# Patient Record
Sex: MALE | Race: WHITE | Employment: OTHER | URBAN - METROPOLITAN AREA
[De-identification: names, ages, dates, MRNs, and addresses within clinical notes are randomized per-mention and may not be internally consistent; named-entity substitution may affect disease eponyms.]

---

## 2019-12-28 ENCOUNTER — APPOINTMENT (OUTPATIENT)
Dept: GENERAL RADIOLOGY | Facility: CLINIC | Age: 84
End: 2019-12-28
Attending: EMERGENCY MEDICINE
Payer: MEDICARE

## 2019-12-28 ENCOUNTER — APPOINTMENT (OUTPATIENT)
Dept: CT IMAGING | Facility: CLINIC | Age: 84
End: 2019-12-28
Attending: EMERGENCY MEDICINE
Payer: MEDICARE

## 2019-12-28 ENCOUNTER — HOSPITAL ENCOUNTER (EMERGENCY)
Facility: CLINIC | Age: 84
Discharge: HOME | End: 2019-12-28
Attending: EMERGENCY MEDICINE | Admitting: EMERGENCY MEDICINE
Payer: MEDICARE

## 2019-12-28 VITALS
SYSTOLIC BLOOD PRESSURE: 139 MMHG | RESPIRATION RATE: 16 BRPM | DIASTOLIC BLOOD PRESSURE: 87 MMHG | TEMPERATURE: 97.9 F | HEART RATE: 60 BPM | OXYGEN SATURATION: 94 %

## 2019-12-28 DIAGNOSIS — S52.531A CLOSED COLLES' FRACTURE OF RIGHT RADIUS, INITIAL ENCOUNTER: ICD-10-CM

## 2019-12-28 PROCEDURE — 73110 X-RAY EXAM OF WRIST: CPT | Mod: RT

## 2019-12-28 PROCEDURE — 40000986 XR WRIST RIGHT 2 VIEW: Mod: RT

## 2019-12-28 PROCEDURE — 99285 EMERGENCY DEPT VISIT HI MDM: CPT | Mod: 25

## 2019-12-28 PROCEDURE — 72220 X-RAY EXAM SACRUM TAILBONE: CPT

## 2019-12-28 PROCEDURE — 70450 CT HEAD/BRAIN W/O DYE: CPT

## 2019-12-28 PROCEDURE — 25605 CLTX DST RDL FX/EPHYS SEP W/: CPT | Mod: RT

## 2019-12-28 RX ORDER — TRAZODONE HYDROCHLORIDE 50 MG/1
50 TABLET, FILM COATED ORAL
COMMUNITY
Start: 2019-09-03 | End: 2023-01-01

## 2019-12-28 RX ORDER — GABAPENTIN 300 MG/1
300 CAPSULE ORAL 2 TIMES DAILY
COMMUNITY
Start: 2019-08-07 | End: 2022-02-28

## 2019-12-28 RX ORDER — SERTRALINE HYDROCHLORIDE 25 MG/1
25 TABLET, FILM COATED ORAL DAILY
COMMUNITY
Start: 2019-08-08 | End: 2022-02-28

## 2019-12-28 RX ORDER — SODIUM CHLORIDE 1 G/1
1 TABLET ORAL 2 TIMES DAILY
COMMUNITY
Start: 2019-09-03 | End: 2022-02-28

## 2019-12-28 RX ORDER — ZOLPIDEM TARTRATE 5 MG/1
5 TABLET ORAL
COMMUNITY
Start: 2019-08-07 | End: 2022-03-07

## 2019-12-28 RX ORDER — MIDODRINE HYDROCHLORIDE 5 MG/1
5 TABLET ORAL 2 TIMES DAILY
COMMUNITY
Start: 2019-09-03 | End: 2022-02-28

## 2019-12-28 RX ORDER — METOPROLOL SUCCINATE 25 MG/1
25 TABLET, EXTENDED RELEASE ORAL EVERY MORNING
COMMUNITY
Start: 2019-11-27 | End: 2022-02-28

## 2019-12-28 RX ORDER — PRIMIDONE 50 MG/1
TABLET ORAL
COMMUNITY
Start: 2019-06-30 | End: 2022-02-28

## 2019-12-28 ASSESSMENT — ENCOUNTER SYMPTOMS
WOUND: 0
JOINT SWELLING: 1

## 2019-12-28 NOTE — ED PROVIDER NOTES
History     Chief Complaint:  Fall    HPI   Jc Cloud is a right-handed 88 year old male currently on Eliquis who presents following a fall. The conditions in the area this morning are glare ice on all road and hard surfaces.  This is a result of freezing rain overnight. The patient was going out from his front door this morning and down the sidewalk to get the paper, when his feet flew out from under him and he fell backwards. During the fall, the patient hit his tailbone and head, breaking the fall with his right arm. He endorses some upper sacral pain and right wrist pain. He has no laceration to his scalp.    Allergies:  Atenolol  Lisinopril  Meperidine  Metoprolol  Quetiapine  Triamterene    Medications:    Eliquis  Neurontin  Toprol  Promatine  Mysoline  Zoloft  Desyrel  Ambien    Past Medical History:    Idiopathic peripheral neuropathy  Pulmonary nodule  Hyperlipidemia  Biventricular cardiac pacemaker in situ  Exertional dyspnea  CAD  Tremor  Depression  HTN  PVC  Anxiety  Panic attack  Insomnia  Orthostatic hypotension  Osteoarthritis  Diverticulosis of large intestine  Hypertrophy of nasal turbinates  Sleep apnea    Past Surgical History:    Inguinal hernia repair  Adenoidectomy  CABG  Cataract surgery    Family History:    Family history reviewed. No pertinent family history.     Social History:  Smoking Status: Former smoker  Alcohol Use: Positive  Drug use: Never    Review of Systems   Musculoskeletal: Positive for joint swelling.        Right wrist pain  Upper sacral pain   Skin: Negative for wound.   All other systems reviewed and are negative.        Physical Exam     Patient Vitals for the past 24 hrs:   BP Temp Temp src Pulse Heart Rate Resp SpO2   12/28/19 1200 139/87 -- -- 60 -- -- 94 %   12/28/19 1155 -- -- -- -- -- -- 95 %   12/28/19 1150 (!) 155/110 -- -- 104 -- -- --   12/28/19 1040 -- -- -- -- -- -- 98 %   12/28/19 1035 -- -- -- -- -- -- 98 %   12/28/19 1030 (!) 176/98 -- -- 66 -- --  97 %   12/28/19 0937 (!) 163/107 97.9  F (36.6  C) Oral 72 72 16 98 %     Physical Exam  General: Resting comfortably on the gurney    He is awake alert and appropriate.  Head:  The scalp, face, and head appear normal, no signs of significant trauma are appreciated  Eyes:  The pupils are equal, round, and reactive to light    There is no nystagmus    Extraocular muscles are intact    Conjunctivae and sclerae are normal  ENT:    The nose is normal    Pinnae are normal    The oropharynx is normal    Uvula is in the midline  Neck:  Normal range of motion    There is no rigidity noted    There is no midline cervical spine pain/tenderness    Trachea is in the midline    No mass is detected  CV:  Patient has atrial fibrillation with controlled ventricular response   Resp:  Lungs are clear    There is no tachypnea    Non-labored    No rales    No wheezing   GI:  Abdomen is soft, there is no rigidity    No distension    No tympani    No rebound tenderness     Non-surgical without peritoneal features  MS:  Right arm: There is dorsal deformity to the distal radius.  This clearly represents fracture.  This is a closed fracture.  Finger movement is normal.  Ulnar, median, radial nerve muscular and sensory function to the hand is normal.  Normal radial artery pulse.    Sacrum: There is mild tenderness to the superior sacrum.  The coccyx bone is nontender.  Skin:  No rash or acute skin lesions noted  Neuro: Speech is normal and fluent    GCS is 15.  Psych:  Awake. Alert.      Normal affect.  Appropriate interactions.  Lymph: No anterior cervical lymphadenopathy noted    Emergency Department Course     Imaging:  Radiology findings were communicated with the patient who voiced understanding of the findings.    XR Wrist Right 2 Views  Cast material obscures fine bone detail. Distal radial  fracture is noted with mild improvement in the dorsal tilting of  distal radial articular surface from the precast radiographs.  Reading per  radiology    XR Wrist Right G/E 3 Views  Distal radial fracture with transverse metaphyseal  fracture and longitudinal extension to the radiocarpal joint. Dorsal  impaction is noted with dorsal tilting of the distal radial articular  surface. Osteopenia.  HODA HOBSON MD  Reading per radiology    XR Sacrum and Coccyx 2 Views  No apparent fracture. Degenerative disc disease is noted  at the lumbosacral junction.  HODA HOBSON MD  Reading per radiology    Head CT w/o contrast  1. No evidence of acute intracranial hemorrhage, mass, or herniation.  2. There is generalized atrophy of the brain. White matter changes are  present in the cerebral hemispheres that are consistent with small  vessel ischemic disease in this age patient.   Reading per radiology    Procedures   PROCEDURE:  Hematoma Block, formed by Dr. Yuan    LOCATION:  Right wrist    ANESTHESIA: Regional block using 1% lidocaine with epi, total of 10 cc.     PROCEDURE NOTE: Betadiane was used to prep the skin. Using 25 gauge needle, skin was anesthestized and small amount of blood was withdrawn from the hematoma. The hematoma was anesthestized with lidocaine with 1% epi. The patient tolerated the procedure well with good relief of her right wrist discomfort.  There were no complications.       Fracture Reduction,      SITE: right wrist     CONSENT: Risks and benefits, along with alternative treatment modalities, were discussed with the patient.  The patient consented to the procedure verbally and signed the proper paperwork.    ANESTHESIA:  See hematoma block procedure note above    TECHNIQUE: Traction was applied and angulation was recreated and reduced.  Good alignment was confirmed by post reduction films were obtained.  The patient tolerated the procedure well and there were no complications.     OUTCOME:  Rechecked the patient after sedation was no longer present, findings and plan explained to the patient. Patients status improved.  Pain was  reduced and feeling more comfortably.       Volar Dorsal Splint in Fiberglass Placement, Dr. Yuan     Splint was applied by myself along with a technician support and after placement I checked and adjusted the fit to ensure proper positioning. Patient was more comfortable with splint in place. Sensation and circulation are intact after splint placement.  Patient was left in a slight degree of wrist flexion and ulnar deviation to attempt to hold the fracture fragments in place.    Emergency Department Course:    0950 Nursing notes and vitals reviewed.    0951 The patient was sent for a CT while in the emergency department, results above.      0959 The patient was sent for XRs while in the emergency department, results above.      1025 I performed an exam of the patient as documented above.     1045 I performed the hematoma block procedure as documented above.     1105 I performed the fracture reduction procedure as documented above. I performed the splint placement procedure as documented above.     1123 The patient was sent for post-reduction XRs while in the emergency department, results above.      1200 Patient rechecked and updated.      Impression & Plan      Medical Decision Making:  Jc Cloud is a 88 year old male who presents to the emergency department today for evaluation after a fall.  Patient suffered some minor scalp trauma, some discomfort after landing on his sacral region and most importantly the patient suffered significant pain and deformity of the right wrist.  Patient is noted to have a comminuted intra-articular distal radius fracture with dorsal angulation.  The patient underwent hematoma block without difficulty and the fracture was straightened and realigned for comfort.  This fracture pattern still has a high likelihood of instability and may require definitive surgical repair.  Consultation with hand surgery next week is indicated for either casting or conversations regarding  definitive surgical fixation.  Patient underwent CT scan of the brain given his use of novel anticoagulation for atrial fibrillation, and no acute bleed is seen.  Head injury sheet will be provided.    Diagnosis:    ICD-10-CM    1. Closed Colles' fracture of right radius, initial encounter S52.531A      Disposition:   The patient is discharged to home.       Scribe Disclosure:  Rishabh HOWELL, am serving as a scribe at 10:00 AM on 12/28/2019 to document services personally performed by Ricih Yuan MD based on my observations and the provider's statements to me.    EMERGENCY DEPARTMENT       Richi Yuan MD  12/28/19 4842

## 2019-12-28 NOTE — ED TRIAGE NOTES
Pt slipped and fell landed on bottom and right wrist. Pt has deformity to wrist. Pt ambulatory on scene

## 2019-12-28 NOTE — DISCHARGE INSTRUCTIONS
Keep your splint on until seen  Elevate is much as possible  You may take extra strength Tylenol as needed for pain  Plan to see the hand surgeon next week for consultation

## 2019-12-28 NOTE — ED NOTES
Bed: ED12  Expected date:   Expected time:   Means of arrival:   [place]  [facility] [place] [facility] [place] 88 [place] wrist pain eta 9245

## 2019-12-30 NOTE — PROGRESS NOTES
I rec'd a call from this patient's son, Kan who was looking for resources for his dad who was here over the weekend s/p fall and a broken wrist.  Kan is concerned with his dad being home alone once his brother who is currently with him has to leave to return home.  I offered for this patient to stay with Kan, for Kan to private pay to have an PCA to stay with his dad and I explained going to a TCU and the up front cost of around $5,000.  In discussion Kan would like to stop by the ER triage and  the list of options for private duty help in the home.  I answered all of Kan's questions and he was grateful for my call back to him.  I left the list of Private Duty care options with Kan's name on it in triage and asked the staff to give it to him when he swings by.

## 2020-01-03 ENCOUNTER — HOSPITAL ENCOUNTER (OUTPATIENT)
Facility: CLINIC | Age: 85
Setting detail: OBSERVATION
Discharge: HOME | End: 2020-01-04
Attending: ORTHOPAEDIC SURGERY | Admitting: ORTHOPAEDIC SURGERY
Payer: MEDICARE

## 2020-01-03 ENCOUNTER — ANESTHESIA EVENT (OUTPATIENT)
Dept: SURGERY | Facility: CLINIC | Age: 85
End: 2020-01-03
Payer: MEDICARE

## 2020-01-03 ENCOUNTER — ANESTHESIA (OUTPATIENT)
Dept: SURGERY | Facility: CLINIC | Age: 85
End: 2020-01-03
Payer: MEDICARE

## 2020-01-03 ENCOUNTER — APPOINTMENT (OUTPATIENT)
Dept: GENERAL RADIOLOGY | Facility: CLINIC | Age: 85
End: 2020-01-03
Attending: ORTHOPAEDIC SURGERY
Payer: MEDICARE

## 2020-01-03 DIAGNOSIS — G89.18 POST-OP PAIN: Primary | ICD-10-CM

## 2020-01-03 PROBLEM — R09.02 OXYGEN DESATURATION: Status: ACTIVE | Noted: 2020-01-03

## 2020-01-03 PROCEDURE — 37000008 ZZH ANESTHESIA TECHNICAL FEE, 1ST 30 MIN: Performed by: ORTHOPAEDIC SURGERY

## 2020-01-03 PROCEDURE — 25800030 ZZH RX IP 258 OP 636: Performed by: NURSE ANESTHETIST, CERTIFIED REGISTERED

## 2020-01-03 PROCEDURE — 36000058 ZZH SURGERY LEVEL 3 EA 15 ADDTL MIN: Performed by: ORTHOPAEDIC SURGERY

## 2020-01-03 PROCEDURE — 25000132 ZZH RX MED GY IP 250 OP 250 PS 637: Mod: GY | Performed by: PHYSICIAN ASSISTANT

## 2020-01-03 PROCEDURE — G0378 HOSPITAL OBSERVATION PER HR: HCPCS

## 2020-01-03 PROCEDURE — 25000128 H RX IP 250 OP 636: Performed by: NURSE ANESTHETIST, CERTIFIED REGISTERED

## 2020-01-03 PROCEDURE — 27210794 ZZH OR GENERAL SUPPLY STERILE: Performed by: ORTHOPAEDIC SURGERY

## 2020-01-03 PROCEDURE — 71000012 ZZH RECOVERY PHASE 1 LEVEL 1 FIRST HR: Performed by: ORTHOPAEDIC SURGERY

## 2020-01-03 PROCEDURE — 25000128 H RX IP 250 OP 636: Performed by: ANESTHESIOLOGY

## 2020-01-03 PROCEDURE — 36000060 ZZH SURGERY LEVEL 3 W FLUORO 1ST 30 MIN: Performed by: ORTHOPAEDIC SURGERY

## 2020-01-03 PROCEDURE — 40000170 ZZH STATISTIC PRE-PROCEDURE ASSESSMENT II: Performed by: ORTHOPAEDIC SURGERY

## 2020-01-03 PROCEDURE — 25000128 H RX IP 250 OP 636: Performed by: PHYSICIAN ASSISTANT

## 2020-01-03 PROCEDURE — 71000013 ZZH RECOVERY PHASE 1 LEVEL 1 EA ADDTL HR: Performed by: ORTHOPAEDIC SURGERY

## 2020-01-03 PROCEDURE — 37000009 ZZH ANESTHESIA TECHNICAL FEE, EACH ADDTL 15 MIN: Performed by: ORTHOPAEDIC SURGERY

## 2020-01-03 PROCEDURE — C1713 ANCHOR/SCREW BN/BN,TIS/BN: HCPCS | Performed by: ORTHOPAEDIC SURGERY

## 2020-01-03 PROCEDURE — 25000125 ZZHC RX 250: Performed by: NURSE ANESTHETIST, CERTIFIED REGISTERED

## 2020-01-03 PROCEDURE — 25000125 ZZHC RX 250: Performed by: ORTHOPAEDIC SURGERY

## 2020-01-03 PROCEDURE — 40000277 XR SURGERY CARM FLUORO LESS THAN 5 MIN W STILLS

## 2020-01-03 DEVICE — IMPLANTABLE DEVICE: Type: IMPLANTABLE DEVICE | Site: WRIST | Status: FUNCTIONAL

## 2020-01-03 RX ORDER — FENTANYL CITRATE 50 UG/ML
INJECTION, SOLUTION INTRAMUSCULAR; INTRAVENOUS PRN
Status: DISCONTINUED | OUTPATIENT
Start: 2020-01-03 | End: 2020-01-03

## 2020-01-03 RX ORDER — SODIUM CHLORIDE 1 G/1
1 TABLET ORAL 2 TIMES DAILY
Status: DISCONTINUED | OUTPATIENT
Start: 2020-01-03 | End: 2020-01-04 | Stop reason: HOSPADM

## 2020-01-03 RX ORDER — SERTRALINE HYDROCHLORIDE 25 MG/1
25 TABLET, FILM COATED ORAL DAILY
Status: DISCONTINUED | OUTPATIENT
Start: 2020-01-04 | End: 2020-01-04 | Stop reason: HOSPADM

## 2020-01-03 RX ORDER — DEXAMETHASONE SODIUM PHOSPHATE 4 MG/ML
INJECTION, SOLUTION INTRA-ARTICULAR; INTRALESIONAL; INTRAMUSCULAR; INTRAVENOUS; SOFT TISSUE PRN
Status: DISCONTINUED | OUTPATIENT
Start: 2020-01-03 | End: 2020-01-03

## 2020-01-03 RX ORDER — MEPERIDINE HYDROCHLORIDE 25 MG/ML
12.5 INJECTION INTRAMUSCULAR; INTRAVENOUS; SUBCUTANEOUS
Status: DISCONTINUED | OUTPATIENT
Start: 2020-01-03 | End: 2020-01-03 | Stop reason: HOSPADM

## 2020-01-03 RX ORDER — ONDANSETRON 2 MG/ML
4 INJECTION INTRAMUSCULAR; INTRAVENOUS EVERY 30 MIN PRN
Status: DISCONTINUED | OUTPATIENT
Start: 2020-01-03 | End: 2020-01-03 | Stop reason: HOSPADM

## 2020-01-03 RX ORDER — NALOXONE HYDROCHLORIDE 0.4 MG/ML
.1-.4 INJECTION, SOLUTION INTRAMUSCULAR; INTRAVENOUS; SUBCUTANEOUS
Status: DISCONTINUED | OUTPATIENT
Start: 2020-01-03 | End: 2020-01-03 | Stop reason: HOSPADM

## 2020-01-03 RX ORDER — ONDANSETRON 2 MG/ML
INJECTION INTRAMUSCULAR; INTRAVENOUS PRN
Status: DISCONTINUED | OUTPATIENT
Start: 2020-01-03 | End: 2020-01-03

## 2020-01-03 RX ORDER — PROPOFOL 10 MG/ML
INJECTION, EMULSION INTRAVENOUS CONTINUOUS PRN
Status: DISCONTINUED | OUTPATIENT
Start: 2020-01-03 | End: 2020-01-03

## 2020-01-03 RX ORDER — CEFAZOLIN SODIUM 1 G/3ML
1 INJECTION, POWDER, FOR SOLUTION INTRAMUSCULAR; INTRAVENOUS SEE ADMIN INSTRUCTIONS
Status: DISCONTINUED | OUTPATIENT
Start: 2020-01-03 | End: 2020-01-03 | Stop reason: HOSPADM

## 2020-01-03 RX ORDER — BUPIVACAINE HYDROCHLORIDE AND EPINEPHRINE 5; 5 MG/ML; UG/ML
INJECTION, SOLUTION EPIDURAL; INTRACAUDAL; PERINEURAL
Status: DISCONTINUED
Start: 2020-01-03 | End: 2020-01-03 | Stop reason: HOSPADM

## 2020-01-03 RX ORDER — BUPIVACAINE HYDROCHLORIDE AND EPINEPHRINE 5; 5 MG/ML; UG/ML
INJECTION, SOLUTION PERINEURAL PRN
Status: DISCONTINUED | OUTPATIENT
Start: 2020-01-03 | End: 2020-01-03 | Stop reason: HOSPADM

## 2020-01-03 RX ORDER — HYDROCODONE BITARTRATE AND ACETAMINOPHEN 5; 325 MG/1; MG/1
1-2 TABLET ORAL EVERY 4 HOURS PRN
Status: DISCONTINUED | OUTPATIENT
Start: 2020-01-03 | End: 2020-01-04 | Stop reason: HOSPADM

## 2020-01-03 RX ORDER — MIDODRINE HYDROCHLORIDE 5 MG/1
5 TABLET ORAL 3 TIMES DAILY
Status: DISCONTINUED | OUTPATIENT
Start: 2020-01-03 | End: 2020-01-04 | Stop reason: HOSPADM

## 2020-01-03 RX ORDER — ONDANSETRON 4 MG/1
4 TABLET, ORALLY DISINTEGRATING ORAL EVERY 30 MIN PRN
Status: DISCONTINUED | OUTPATIENT
Start: 2020-01-03 | End: 2020-01-03 | Stop reason: HOSPADM

## 2020-01-03 RX ORDER — PRIMIDONE 50 MG/1
25 TABLET ORAL 2 TIMES DAILY
Status: DISCONTINUED | OUTPATIENT
Start: 2020-01-03 | End: 2020-01-04 | Stop reason: HOSPADM

## 2020-01-03 RX ORDER — METOCLOPRAMIDE HYDROCHLORIDE 5 MG/ML
5 INJECTION INTRAMUSCULAR; INTRAVENOUS EVERY 6 HOURS PRN
Status: DISCONTINUED | OUTPATIENT
Start: 2020-01-03 | End: 2020-01-04 | Stop reason: HOSPADM

## 2020-01-03 RX ORDER — CEFAZOLIN SODIUM 2 G/100ML
2 INJECTION, SOLUTION INTRAVENOUS
Status: COMPLETED | OUTPATIENT
Start: 2020-01-03 | End: 2020-01-03

## 2020-01-03 RX ORDER — ZOLPIDEM TARTRATE 5 MG/1
5 TABLET ORAL
Status: DISCONTINUED | OUTPATIENT
Start: 2020-01-03 | End: 2020-01-04 | Stop reason: DRUGHIGH

## 2020-01-03 RX ORDER — METOPROLOL SUCCINATE 25 MG/1
25 TABLET, EXTENDED RELEASE ORAL 2 TIMES DAILY
Status: DISCONTINUED | OUTPATIENT
Start: 2020-01-03 | End: 2020-01-04 | Stop reason: HOSPADM

## 2020-01-03 RX ORDER — SODIUM CHLORIDE, SODIUM LACTATE, POTASSIUM CHLORIDE, CALCIUM CHLORIDE 600; 310; 30; 20 MG/100ML; MG/100ML; MG/100ML; MG/100ML
INJECTION, SOLUTION INTRAVENOUS CONTINUOUS PRN
Status: DISCONTINUED | OUTPATIENT
Start: 2020-01-03 | End: 2020-01-03

## 2020-01-03 RX ORDER — MAGNESIUM HYDROXIDE 1200 MG/15ML
LIQUID ORAL PRN
Status: DISCONTINUED | OUTPATIENT
Start: 2020-01-03 | End: 2020-01-03 | Stop reason: HOSPADM

## 2020-01-03 RX ORDER — FENTANYL CITRATE 0.05 MG/ML
25-50 INJECTION, SOLUTION INTRAMUSCULAR; INTRAVENOUS
Status: COMPLETED | OUTPATIENT
Start: 2020-01-03 | End: 2020-01-03

## 2020-01-03 RX ORDER — NALOXONE HYDROCHLORIDE 0.4 MG/ML
.1-.4 INJECTION, SOLUTION INTRAMUSCULAR; INTRAVENOUS; SUBCUTANEOUS
Status: DISCONTINUED | OUTPATIENT
Start: 2020-01-03 | End: 2020-01-04 | Stop reason: HOSPADM

## 2020-01-03 RX ORDER — PROCHLORPERAZINE MALEATE 5 MG
5 TABLET ORAL EVERY 6 HOURS PRN
Status: DISCONTINUED | OUTPATIENT
Start: 2020-01-03 | End: 2020-01-04 | Stop reason: HOSPADM

## 2020-01-03 RX ORDER — HYDRALAZINE HYDROCHLORIDE 20 MG/ML
2.5-5 INJECTION INTRAMUSCULAR; INTRAVENOUS EVERY 10 MIN PRN
Status: DISCONTINUED | OUTPATIENT
Start: 2020-01-03 | End: 2020-01-03 | Stop reason: HOSPADM

## 2020-01-03 RX ORDER — METOCLOPRAMIDE 5 MG/1
5 TABLET ORAL EVERY 6 HOURS PRN
Status: DISCONTINUED | OUTPATIENT
Start: 2020-01-03 | End: 2020-01-04 | Stop reason: HOSPADM

## 2020-01-03 RX ORDER — ONDANSETRON 2 MG/ML
4 INJECTION INTRAMUSCULAR; INTRAVENOUS EVERY 6 HOURS PRN
Status: DISCONTINUED | OUTPATIENT
Start: 2020-01-03 | End: 2020-01-04 | Stop reason: HOSPADM

## 2020-01-03 RX ORDER — HYDROCODONE BITARTRATE AND ACETAMINOPHEN 5; 325 MG/1; MG/1
TABLET ORAL
Qty: 24 TABLET | Refills: 0 | Status: SHIPPED | OUTPATIENT
Start: 2020-01-03 | End: 2022-03-07

## 2020-01-03 RX ORDER — ALBUTEROL SULFATE 0.83 MG/ML
2.5 SOLUTION RESPIRATORY (INHALATION) EVERY 4 HOURS PRN
Status: DISCONTINUED | OUTPATIENT
Start: 2020-01-03 | End: 2020-01-03 | Stop reason: HOSPADM

## 2020-01-03 RX ORDER — TRAZODONE HYDROCHLORIDE 50 MG/1
50-100 TABLET, FILM COATED ORAL AT BEDTIME
Status: DISCONTINUED | OUTPATIENT
Start: 2020-01-03 | End: 2020-01-04 | Stop reason: HOSPADM

## 2020-01-03 RX ORDER — PROPOFOL 10 MG/ML
INJECTION, EMULSION INTRAVENOUS PRN
Status: DISCONTINUED | OUTPATIENT
Start: 2020-01-03 | End: 2020-01-03

## 2020-01-03 RX ORDER — SODIUM CHLORIDE, SODIUM LACTATE, POTASSIUM CHLORIDE, CALCIUM CHLORIDE 600; 310; 30; 20 MG/100ML; MG/100ML; MG/100ML; MG/100ML
INJECTION, SOLUTION INTRAVENOUS CONTINUOUS
Status: DISCONTINUED | OUTPATIENT
Start: 2020-01-03 | End: 2020-01-03 | Stop reason: HOSPADM

## 2020-01-03 RX ORDER — LIDOCAINE 40 MG/G
CREAM TOPICAL
Status: DISCONTINUED | OUTPATIENT
Start: 2020-01-03 | End: 2020-01-04 | Stop reason: HOSPADM

## 2020-01-03 RX ORDER — ONDANSETRON 4 MG/1
4 TABLET, ORALLY DISINTEGRATING ORAL EVERY 6 HOURS PRN
Status: DISCONTINUED | OUTPATIENT
Start: 2020-01-03 | End: 2020-01-04 | Stop reason: HOSPADM

## 2020-01-03 RX ADMIN — PHENYLEPHRINE HYDROCHLORIDE 100 MCG: 10 INJECTION INTRAVENOUS at 17:20

## 2020-01-03 RX ADMIN — PROPOFOL 20 MG: 10 INJECTION, EMULSION INTRAVENOUS at 17:26

## 2020-01-03 RX ADMIN — PHENYLEPHRINE HYDROCHLORIDE 100 MCG: 10 INJECTION INTRAVENOUS at 17:35

## 2020-01-03 RX ADMIN — TRAZODONE HYDROCHLORIDE 50 MG: 50 TABLET ORAL at 23:21

## 2020-01-03 RX ADMIN — PROPOFOL 40 MG: 10 INJECTION, EMULSION INTRAVENOUS at 16:59

## 2020-01-03 RX ADMIN — PROPOFOL 100 MCG/KG/MIN: 10 INJECTION, EMULSION INTRAVENOUS at 16:57

## 2020-01-03 RX ADMIN — FENTANYL CITRATE 50 MCG: 50 INJECTION, SOLUTION INTRAMUSCULAR; INTRAVENOUS at 16:53

## 2020-01-03 RX ADMIN — PHENYLEPHRINE HYDROCHLORIDE 200 MCG: 10 INJECTION INTRAVENOUS at 17:53

## 2020-01-03 RX ADMIN — SODIUM CHLORIDE, POTASSIUM CHLORIDE, SODIUM LACTATE AND CALCIUM CHLORIDE: 600; 310; 30; 20 INJECTION, SOLUTION INTRAVENOUS at 16:53

## 2020-01-03 RX ADMIN — CEFAZOLIN SODIUM 2 G: 2 INJECTION, SOLUTION INTRAVENOUS at 16:57

## 2020-01-03 RX ADMIN — METOPROLOL SUCCINATE 25 MG: 25 TABLET, EXTENDED RELEASE ORAL at 23:21

## 2020-01-03 RX ADMIN — BUPIVACAINE HYDROCHLORIDE 30 ML GIVEN: 5 INJECTION, SOLUTION EPIDURAL; INTRACAUDAL; PERINEURAL at 14:51

## 2020-01-03 RX ADMIN — PHENYLEPHRINE HYDROCHLORIDE 100 MCG: 10 INJECTION INTRAVENOUS at 17:34

## 2020-01-03 RX ADMIN — FENTANYL CITRATE 50 MCG: 0.05 INJECTION, SOLUTION INTRAMUSCULAR; INTRAVENOUS at 14:37

## 2020-01-03 RX ADMIN — ONDANSETRON 4 MG: 2 INJECTION INTRAMUSCULAR; INTRAVENOUS at 17:01

## 2020-01-03 RX ADMIN — FENTANYL CITRATE 50 MCG: 50 INJECTION, SOLUTION INTRAMUSCULAR; INTRAVENOUS at 17:27

## 2020-01-03 RX ADMIN — DEXAMETHASONE SODIUM PHOSPHATE 4 MG: 4 INJECTION, SOLUTION INTRA-ARTICULAR; INTRALESIONAL; INTRAMUSCULAR; INTRAVENOUS; SOFT TISSUE at 17:01

## 2020-01-03 RX ADMIN — PROPOFOL 30 MG: 10 INJECTION, EMULSION INTRAVENOUS at 17:28

## 2020-01-03 RX ADMIN — PHENYLEPHRINE HYDROCHLORIDE 200 MCG: 10 INJECTION INTRAVENOUS at 17:47

## 2020-01-03 RX ADMIN — PHENYLEPHRINE HYDROCHLORIDE 100 MCG: 10 INJECTION INTRAVENOUS at 17:06

## 2020-01-03 RX ADMIN — HYDROCODONE BITARTRATE AND ACETAMINOPHEN 1 TABLET: 5; 325 TABLET ORAL at 23:21

## 2020-01-03 ASSESSMENT — ENCOUNTER SYMPTOMS
DYSRHYTHMIAS: 1
SEIZURES: 0

## 2020-01-03 ASSESSMENT — COPD QUESTIONNAIRES: COPD: 0

## 2020-01-03 ASSESSMENT — MIFFLIN-ST. JEOR: SCORE: 1392.03

## 2020-01-03 ASSESSMENT — LIFESTYLE VARIABLES: TOBACCO_USE: 0

## 2020-01-03 NOTE — ANESTHESIA PREPROCEDURE EVALUATION
Anesthesia Pre-Procedure Evaluation    Patient: Jc Cloud   MRN: [id number] : 3/25/1931          Preoperative Diagnosis: Radius fracture [S52.90XA]    Procedure(s):  OPEN REDUCTION INTERNAL FIXATION RIGHT RADIUS FRACTURE ( JUANA PLATE ; SHERRY )    Past Medical History:   Diagnosis Date     Atrial fibrillation (H)      Coronary artery disease      Hyperlipidemia      Neuropathy      Pacemaker      Sleep apnea      Past Surgical History:   Procedure Laterality Date     ADENOIDECTOMY       BYPASS GRAFT ARTERY CORONARY       CATARACT IOL, RT/LT       HERNIA REPAIR       Allergies no known allergies  Prior to Admission medications    Medication Sig Start Date End Date Taking? Authorizing Provider   apixaban ANTICOAGULANT (ELIQUIS ANTICOAGULANT) 2.5 MG tablet TAKE 1 TABLET BY MOUTH TWICE A DAY 19   Reported, Patient   gabapentin (NEURONTIN) 300 MG capsule Take 300 mg by mouth 19   Reported, Patient   metoprolol succinate ER (TOPROL-XL) 25 MG 24 hr tablet TAKE 1 TABLET BY MOUTH TWICE A DAY 19   Reported, Patient   midodrine (PROAMATINE) 5 MG tablet Take 5 mg by mouth 9/3/19   Reported, Patient   primidone (MYSOLINE) 50 MG tablet TAKE 1/2 TABLET BY MOUTH TWICE DAILY 19   Reported, Patient   sertraline (ZOLOFT) 25 MG tablet Take 25 mg by mouth 19   Reported, Patient   sodium chloride 1 GM tablet Take 1 g by mouth 9/3/19   Reported, Patient   traZODone (DESYREL) 50 MG tablet Take  mg by mouth 9/3/19   Reported, Patient   zolpidem (AMBIEN) 5 MG tablet Take 5 mg by mouth 19   Reported, Patient     ECG: AV dual-paced rhythm with occasional ventricular-paced complexes and with frequent , and consecutive Premature ventricular complexes  Abnormal ECG  When compared with ECG of 2018 12:07,  Premature ventricular complexes are now Present      Anesthesia Evaluation     .             ROS/MED HX    ENT/Pulmonary:     (+)sleep apnea, , . .   (-) tobacco use, asthma and COPD    Neurologic:     (+)neuropathy    (-) seizures, CVA and TIA   Cardiovascular:     (+) Dyslipidemia, --CAD, -past MI,CABG-. : . . . pacemaker :. dysrhythmias a-fib, .      (-) valvular problems/murmurs   METS/Exercise Tolerance:  3 - Able to walk 1-2 blocks without stopping   Hematologic:        (-) history of blood clots, anemia and other hematologic disorder   Musculoskeletal:   (+) arthritis,  -       GI/Hepatic:        (-) GERD and liver disease   Renal/Genitourinary:      (-) nephrolithiasis   Endo:      (-) Type I DM, Type II DM, thyroid disease and obesity   Psychiatric:     (+) psychiatric history anxiety and depression      Infectious Disease:        (-) Recent Fever   Malignancy:         Other:                          Physical Exam  Normal systems: cardiovascular, pulmonary and dental    Airway   Mallampati: II  TM distance: >3 FB  Neck ROM: full    Dental     Cardiovascular   Rhythm and rate: regular and normal      Pulmonary    breath sounds clear to auscultation            Preop Vitals  BP Readings from Last 3 Encounters:   12/28/19 139/87    Pulse Readings from Last 3 Encounters:   12/28/19 60      Resp Readings from Last 3 Encounters:   12/28/19 16    SpO2 Readings from Last 3 Encounters:   12/28/19 94%      Temp Readings from Last 1 Encounters:   12/28/19 36.6  C (97.9  F) (Oral)    Ht Readings from Last 1 Encounters:   No data found for Ht      Wt Readings from Last 1 Encounters:   No data found for Wt    There is no height or weight on file to calculate BMI.       Anesthesia Plan      History & Physical Review      ASA Status:  3 .    NPO Status:  > 8 hours    Plan for Peripheral Nerve Block   PONV prophylaxis:  Ondansetron (or other 5HT-3) and Dexamethasone or Solumedrol  Propofol infusion      Postoperative Care  Postoperative pain management:  Multi-modal analgesia.      Consents  Anesthetic plan, risks, benefits and alternatives discussed with:  Patient..                 Renee Powers MD

## 2020-01-03 NOTE — ANESTHESIA PROCEDURE NOTES
Peripheral nerve/Neuraxial procedure note : Other and Brachial plexus (axillary nerve block)  Pre-Procedure  Performed by Renee Powers MD  Location: pre-op    Procedure Times:1/3/2020 2:41 PM and 1/3/2020 2:51 PM  Pre-Anesthestic Checklist: patient identified, IV checked, site marked, risks and benefits discussed, informed consent, monitors and equipment checked, pre-op evaluation, at physician/surgeon's request and post-op pain management    Timeout  Correct Patient: Yes   Correct Procedure: Yes   Correct Site: Yes   Correct Laterality: Yes   Correct Position: Yes   Site Marked: Yes   .   Procedure Documentation    .    Procedure: Other and Brachial plexus (axillary nerve block), right.   Patient Position:sitting Local skin infiltrated with 1 mL of 1% lidocaine.    Ultrasound used to identify targeted nerve, plexus, or vascular marker and placed a needle adjacent to it., Ultrasound was used to visualize the spread of the anesthetic in close proximity to the above stated nerve. A permanent image is entered into the patient's record.  Patient Prep/Sterile Barriers; povidone-iodine 7.5% surgical scrub.  .  Needle: insulated   Needle Gauge: 21.    Needle Length (Inches) 3.13   Insertion Method: Single Shot.        Assessment/Narrative  Paresthesias: No.  .  The placement was negative for: blood aspirated, painful injection and site bleeding.  Bolus given via needle. No blood aspirated via catheter.   Secured via.   Complications:. Comments:  Bolus via needle, 30 ml of 0.5% Bupivacaine with 1:400,000 epinephrine.  Patient tolerated well, was mildly sedated but communicative throughout the procedure.   The surgeon has given a verbal order transferring care of this patient to me for the performance of regional analgesia block for post op pain control. It is requested of me because I am uniquely trained and qualified to perform this block and the surgeon is neither trained nor qualified to perform this procedure.

## 2020-01-04 VITALS
SYSTOLIC BLOOD PRESSURE: 132 MMHG | DIASTOLIC BLOOD PRESSURE: 74 MMHG | HEIGHT: 71 IN | OXYGEN SATURATION: 98 % | BODY MASS INDEX: 21.6 KG/M2 | HEART RATE: 65 BPM | WEIGHT: 154.3 LBS | TEMPERATURE: 98.2 F | RESPIRATION RATE: 16 BRPM

## 2020-01-04 LAB
CREAT SERPL-MCNC: 1.26 MG/DL (ref 0.66–1.25)
GFR SERPL CREATININE-BSD FRML MDRD: 50 ML/MIN/{1.73_M2}
GLUCOSE SERPL-MCNC: 88 MG/DL (ref 70–99)

## 2020-01-04 PROCEDURE — 36415 COLL VENOUS BLD VENIPUNCTURE: CPT | Performed by: ORTHOPAEDIC SURGERY

## 2020-01-04 PROCEDURE — 82565 ASSAY OF CREATININE: CPT | Performed by: ORTHOPAEDIC SURGERY

## 2020-01-04 PROCEDURE — 25000132 ZZH RX MED GY IP 250 OP 250 PS 637: Mod: GY | Performed by: PHYSICIAN ASSISTANT

## 2020-01-04 PROCEDURE — 82947 ASSAY GLUCOSE BLOOD QUANT: CPT | Performed by: ORTHOPAEDIC SURGERY

## 2020-01-04 PROCEDURE — G0378 HOSPITAL OBSERVATION PER HR: HCPCS

## 2020-01-04 RX ADMIN — APIXABAN 2.5 MG: 2.5 TABLET, FILM COATED ORAL at 08:38

## 2020-01-04 RX ADMIN — MIDODRINE HYDROCHLORIDE 5 MG: 5 TABLET ORAL at 08:38

## 2020-01-04 RX ADMIN — SODIUM CHLORIDE TAB 1 GM 1 G: 1 TAB at 00:07

## 2020-01-04 RX ADMIN — HYDROCODONE BITARTRATE AND ACETAMINOPHEN 1 TABLET: 5; 325 TABLET ORAL at 08:38

## 2020-01-04 RX ADMIN — SODIUM CHLORIDE TAB 1 GM 1 G: 1 TAB at 08:37

## 2020-01-04 RX ADMIN — MIDODRINE HYDROCHLORIDE 5 MG: 5 TABLET ORAL at 00:07

## 2020-01-04 RX ADMIN — PRIMIDONE 25 MG: 50 TABLET ORAL at 00:07

## 2020-01-04 RX ADMIN — PRIMIDONE 25 MG: 50 TABLET ORAL at 08:38

## 2020-01-04 RX ADMIN — SERTRALINE HYDROCHLORIDE 25 MG: 25 TABLET ORAL at 08:38

## 2020-01-04 RX ADMIN — METOPROLOL SUCCINATE 25 MG: 25 TABLET, EXTENDED RELEASE ORAL at 08:38

## 2020-01-04 NOTE — BRIEF OP NOTE
[facility]    Brief Operative Note    Pre-operative diagnosis: Radius fracture [S52.90XA]  Post-operative diagnosis;       Comminuted Intraarticular radius fracture    Procedure: Procedure(s):  OPEN REDUCTION INTERNAL FIXATION RIGHT DISTAL RADIUS FRACTURE  Surgeon: Surgeon(s) and Role:     * Susan Mcdonald MD - Primary     * Leena Naidu PA-C - Assisting  Anesthesia: None   Estimated blood loss: Less than 10 ml  Drains: None  Specimens: * No specimens in log *  Findings:   None.  Complications: None.  Implants:   Implant Name Type Inv. Item Serial No.  Lot No. LRB No. Used   little Variax 2 wrist; 76mm intermediate volar distal radius plate     LITTLE 41 07 [id number] Right 1   little Variax 2 wrist; 2.4MM LOCKING SCREW (22MM)    LITTLE 41 07 [id number] Right 1   little Variax 2 wrist; 2.4MM LOCKING SCREW (24MM)    LITTLE 41 07 [id number] Right 2   little Variax 2 wrist; 2.4MM LOCKING SCREW (18MM)    LITTLE 41 07 [id number] Right 2   little Variax 2 wrist; 2.4MM NON LOCKING SCREWS (22MM)    LITTLE 41 07 31 DEC 2019 Right 1   little Variax 2 wrist; 2.7MM NON LOCKING SCREWS (16MM)    LITTLE 41 07 [id number] Right 3   little Variax 2 wrist; 2.7MM NON LOCKING SCREWS (18MM)     41 07 [id number] Right 1   little Variax 2 wrist; 2.7MM NON LOCKING SCREWS (18MM)    LITTLE 41 07 [id number] Right 1   little Variax 2 wrist; 2.7MM NON LOCKING SCREWS (26MM)    LITTLE 41 07 [id number] Right 2   little Variax 2 wrist; 2.7MM NON LOCKING SCREWS (28MM)    LITTLE 41 07 [id number] Right 1   little Variax 2 wrist; DRILL BIT  2.0     LITTLE 41 07 [id number] Right 1

## 2020-01-04 NOTE — ANESTHESIA POSTPROCEDURE EVALUATION
Patient: Jc Cloud    Procedure(s):  OPEN REDUCTION INTERNAL FIXATION RIGHT DISTAL RADIUS FRACTURE    Diagnosis:Radius fracture [S52.90XA]  Diagnosis Additional Information: No value filed.    Anesthesia Type:  Peripheral Nerve Block    Note:  Anesthesia Post Evaluation    Patient location during evaluation: PACU  Patient participation: Able to fully participate in evaluation  Level of consciousness: awake and alert  Pain management: adequate  Airway patency: patent  Cardiovascular status: acceptable  Respiratory status: requiring 1L NC, will stay in observation tonight.  Hydration status: acceptable  PONV: none     Anesthetic complications: None          Last vitals:  Vitals:    01/03/20 2050 01/03/20 2100 01/03/20 2130   BP: (!) 135/94 (!) 138/92 106/64   Pulse: 68 71 67   Resp: 22 16    Temp:   37  C (98.6  F)   SpO2: 94% 93% 95%         Electronically Signed By: Juan José Patten MD  January 3, 2020  9:57 PM

## 2020-01-04 NOTE — PROGRESS NOTES
-Return to baseline mental status - Yes  -Hypercapnia, hypoventilation or hypoxia resolved for at least 2 hours without supplemental oxygen - Yes but patient declined continuous monitoring   -Deficits in sensation, mobility or coordination have resolved if spinal or regional anesthesia was used - Absent/numbness due to nerve block

## 2020-01-04 NOTE — PROGRESS NOTES
"Called Pt's Son \"Polly\" and notified him of pt's readiness to discharge.  Pt's son is now at the bedside.  Will reiterate pt's discharge instructions.  Last night when getting report from the PACU RN she stated that the pt's son already had the scripts for the pt.  Went over discharge instructions with pt and pt's son Polly.  Pt understands that his block may last a bit and he feels well enough to go home.  Pt's O2 sats have maintained in the 92's on RA.  Pt has met his goals and his son Polly and Kan are taking him home.    "

## 2020-01-04 NOTE — OR NURSING
1925  Pt given incentive spirometer to use frequently, encourage coughing. Sitting upright in bed, eating crackers and drinking juice. Sons at bedside. Still needing 1 liter of oxygen  2030 PT ON ROOM AIR BUT NEEDING INCENTIVE SPIROMETER AND CDB FREQUENTLY TO KEEPS SATS UP.  WILL DROP TO 85% AT TIMES BUT WILL COME BACK UP WITH IS.  Dr Shankar notified of sats, orders placed for pt to spend the night  2100 pt stood to void with 2 person assist

## 2020-01-04 NOTE — DISCHARGE INSTRUCTIONS
Same Day Surgery Discharge Instructions for  Sedation and General Anesthesia       It's not unusual to feel dizzy, light-headed or faint for up to 24 hours after surgery or while taking pain medication.  If you have these symptoms: sit for a few minutes before standing and have someone assist you when you get up to walk or use the bathroom.      You should rest and relax for the next 24 hours. We recommend you make arrangements to have an adult stay with you for at least 24 hours after your discharge.  Avoid hazardous and strenuous activity.      DO NOT DRIVE any vehicle or operate mechanical equipment for 24 hours following the end of your surgery.  Even though you may feel normal, your reactions may be affected by the medication you have received.      Do not drink alcoholic beverages for 24 hours following surgery.       Slowly progress to your regular diet as you feel able. It's not unusual to feel nauseated and/or vomit after receiving anesthesia.  If you develop these symptoms, drink clear liquids (apple juice, ginger ale, broth, 7-up, etc. ) until you feel better.  If your nausea and vomiting persists for 24 hours, please notify your surgeon.        All narcotic pain medications, along with inactivity and anesthesia, can cause constipation. Drinking plenty of liquids and increasing fiber intake will help.      For any questions of a medical nature, call your surgeon.      Do not make important decisions for 24 hours.      If you had general anesthesia, you may have a sore throat for a couple of days related to the breathing tube used during surgery.  You may use Cepacol lozenges to help with this discomfort.  If it worsens or if you develop a fever, contact your surgeon.       If you feel your pain is not well managed with the pain medications prescribed by your surgeon, please contact your surgeon's office to let them know so they can address your concerns.     **If you have questions or concerns about your  procedure,  call Dr. Mcdonald at [phone]**

## 2020-01-04 NOTE — OP NOTE
SURGEON: CLAIRE JIMENEZ M.D.  ASSISTANT: LUX AREVALO PA-C.    NEED FOR ASSISTANT  A skilled first assistant was necessary through all portions of the case in order to assist with patient positioning, careful retracting to avoid nerve damage, wound closure, and dressing and splint application.    PREOPERATIVE DIAGNOSIS: Comminuted Intraarticular right distal radius fracture.    POSTOPERATIVE DIAGNOSIS:Comminuted  Intraarticular right distal radius fracture.    PROCEDURE: Open reduction and internal fixation of the radius with an [employer] plate ([id number]).    ANESTHESIA  Regional/general.    TOURNIQUET TIME  50 minutes    INFORMED CONSENT  Obtained and signed prior to entrance to the operating room.    SURGICAL SITE SIGNED  The surgical site was signed by me prior to entering the OR.    TIME OUT  Performed prior to incision in the operating room.    PROPHYLACTIC ANTIBIOTICS  Ancef 2 gms given before the tourniquet was inflated.    DVT INDICATIONS  SCD's were not applied.    PROCEDURE:  The arm was prepped and draped in a normal standard manner.  A time-out was performed confirming the surgical site and that antibiotics had been given prior to the tourniquet inflation.  The arm was exsanguinated and the tourniquet was inflated 150 mm above the systolic pressure.    An incision was made directly over the FCR sheath and was carried down through the subcutaneous tissue.  The FCR sheath was then divided dorsally and volarly taking care not to cut the palmar cutaneous branch of the median nerve.  The FCR was retracted ulnarly along with the contents of the carpal canal.  The pronator quadratus was cut on the radial side and elevated ulnarly with the underlying fascia.  Weitlaner was used to retract the soft tissues.  The fracture was reduced and held with two K-wires placed through the radial styloid into the proximal radius to hold the fracture reduced.  Once this was done, the plate was chosen for the distal radius  and another 0.045 K-wire was used to hold the plate in place.  I drilled for the sliding screw hole first and a 3.5 screw was placed there.  I then drilled the distal articular screws under mini C-arm fluoroscopy.  All of the distal screws were just shy of the dorsal cortex.  The remaining 3.5 bi-cortical screws were drilled, measured, and placed.  Final x-rays were taken confirming the screw placement and reduction of the fracture.  The area was then irrigated out.  The pronator quadratus was repaired radially with 3-0 undyed Vicryl.  The FCR sheath was repaired volarly with 3-0 undyed Vicryl.  The tourniquet was then let down and all bleeders were cauterized.  The subcutaneous skin was closed with 3-0 undyed Vicryl and skin was closed with 4-0 Monocryl.  Marcaine with epinephrine was injected.  Splint was applied and the patient was transferred to the recovery room in stable condition.    Cc: my office

## 2020-01-04 NOTE — PROGRESS NOTES
"Jc Cloud  2020  POD # 1 right distal radius fracture  Admit Date:  1/3/2020      Clean wound without signs of infection.  Objective: no chest pain noted. Patient states he cannot move his fingers and cannot feel anything in his right hand.   Blood pressure (P) 114/74, pulse 65, temperature (P) 98  F (36.7  C), temperature source (P) Oral, resp. rate (P) 18, height 1.803 m (5' 11\"), weight 70 kg (154 lb 4.8 oz), SpO2 (P) 94 %.    Temperatures:  Current - Temp: (P) 98  F (36.7  C); Max - Temp  Av.4  F (36.3  C)  Min: 96  F (35.6  C)  Max: 98.6  F (37  C)  Pulse range: Pulse  Av.3  Min: 60  Max: 95  Blood pressure range: Systolic (24hrs), Av , Min:96 , Max:164   ; Diastolic (24hrs), Av, Min:49, Max:102    Exam:  CMS: intact  alert, stable, wound ok  Splint in good position.   Brisk capillary refill  Patient refused to move his fingers and stated he couldn't feel anything when I was attempting to move and examine him.     Labs:  No results for input(s): POTASSIUM in the last 55486 hours.  No results for input(s): HGB in the last 02610 hours.  No results for input(s): INR in the last 13327 hours.  No results for input(s): PLT in the last 23904 hours.    PLAN: Will follow patient very closely and will let Dr. Mcdonald know about this. Do not discharge patient at this time until patient is able to move and regain some sensation.     "

## 2020-01-04 NOTE — PROVIDER NOTIFICATION
"Cqlled  to ebonie Dior's instruction of \"until patient is able to move and regain some sensation.\"  Pt does have sensation in his thumb, middle finger and index finger.  Pt is able to move his arm around and does still have numbness in his arm.   is OK with pt going home in his present condition.  Blocks do tend to remain intact for approximately 24 hrs stated by .  Will call son and discharge pt.   "

## 2020-01-04 NOTE — PROGRESS NOTES
Pt declined to have the CAPNO, and continuous pulse oximetry on during the night, oxygen saturation of 95% on RA.

## 2020-01-04 NOTE — ANESTHESIA CARE TRANSFER NOTE
Patient: Jc Cloud    Procedure(s):  OPEN REDUCTION INTERNAL FIXATION RIGHT DISTAL RADIUS FRACTURE    Diagnosis: Radius fracture [S52.90XA]  Diagnosis Additional Information: No value filed.    Anesthesia Type:   Peripheral Nerve Block     Note:  Airway :Room Air  Patient transferred to:PACU  Comments: At end of procedure, spontaneous respirations, patient alert to voice, able to follow commands. Patient breathing room air to PACU. SpO2, NiBP, and EKG monitors and alarms on and functioning, report on patient's clinical status given to PACU RN, RN questions answered.Handoff Report: Identifed the Patient, Identified the Reponsible Provider, Reviewed the pertinent medical history, Discussed the surgical course, Reviewed Intra-OP anesthesia mangement and issues during anesthesia, Set expectations for post-procedure period and Allowed opportunity for questions and acknowledgement of understanding      Vitals: (Last set prior to Anesthesia Care Transfer)    CRNA VITALS  1/3/2020 1757 - 1/3/2020 1827      1/3/2020             NIBP:  106/62    Pulse:  65    NIBP Mean:  93    SpO2:  97 %                Electronically Signed By: MAGNOLIA Durand CRNA  January 3, 2020  6:27 PM

## 2020-01-04 NOTE — PLAN OF CARE
POD 1. Pt is A&Ox4, forgetful at times. VSS on RA. Capno refused. Declines need for pain med overnight. CMS ex no sensation to RUE from nerve block still in effect. Sling at bedside for OOB activity. Ace wrap/soft cast in place. Up with A/1 at bedside for urinal. Plan to discharge home today

## 2021-01-21 NOTE — ED AVS SNAPSHOT
Emergency Department  [address] [place] [id number]  Phone:  [phone]  Fax:  [phone]                                    Jc Cloud   MRN: [id number]    Department:   Emergency Department   Date of Visit:  12/28/2019           After Visit Summary Signature Page    I have received my discharge instructions, and my questions have been answered. I have discussed any challenges I see with this plan with the nurse or doctor.    ..........................................................................................................................................  Patient/Patient Representative Signature      ..........................................................................................................................................  Patient Representative Print Name and Relationship to Patient    ..................................................               ................................................  Date                                   Time    ..........................................................................................................................................  Reviewed by Signature/Title    ...................................................              ..............................................  Date                                               Time          22EPIC Rev 08/18        1. Have you been to the ER, urgent care clinic since your last visit? Hospitalized since your last visit? No    2. Have you seen or consulted any other health care providers outside of the [address] [place] [address] since your last visit? Include any pap smears or colon screening.  No

## 2022-02-23 ENCOUNTER — DOCUMENTATION ONLY (OUTPATIENT)
Dept: OTHER | Facility: CLINIC | Age: 87
End: 2022-02-23
Payer: MEDICARE

## 2022-02-23 VITALS
DIASTOLIC BLOOD PRESSURE: 62 MMHG | SYSTOLIC BLOOD PRESSURE: 122 MMHG | BODY MASS INDEX: 19.53 KG/M2 | HEART RATE: 77 BPM | RESPIRATION RATE: 16 BRPM | TEMPERATURE: 97.3 F | WEIGHT: 140 LBS

## 2022-02-23 RX ORDER — FOLIC ACID 1 MG/1
1 TABLET ORAL DAILY
COMMUNITY
End: 2022-02-28

## 2022-02-23 RX ORDER — ACETAMINOPHEN 500 MG
1000 TABLET ORAL 3 TIMES DAILY
COMMUNITY
End: 2022-02-28

## 2022-02-23 RX ORDER — LANOLIN ALCOHOL/MO/W.PET/CERES
400 CREAM (GRAM) TOPICAL DAILY
COMMUNITY
End: 2022-02-28

## 2022-02-23 RX ORDER — POLYETHYLENE GLYCOL 3350 17 G/17G
1 POWDER, FOR SOLUTION ORAL DAILY
COMMUNITY
End: 2022-03-17

## 2022-02-23 RX ORDER — LIDOCAINE 4 G/G
1 PATCH TOPICAL EVERY 24 HOURS
COMMUNITY
End: 2022-03-07

## 2022-02-23 RX ORDER — LANOLIN ALCOHOL/MO/W.PET/CERES
1000 CREAM (GRAM) TOPICAL DAILY
COMMUNITY
End: 2022-02-28

## 2022-02-23 RX ORDER — UBIDECARENONE 75 MG
100 CAPSULE ORAL DAILY
COMMUNITY
End: 2022-02-28

## 2022-02-23 NOTE — PROGRESS NOTES
[facility] GERIATRICS    PRIMARY CARE PROVIDER AND CLINIC:  [facility], [address] / [facility] [address]  Chief Complaint   Patient presents with     [facility] Medical Record Number:  [id number]  Place of Service where encounter took place:  [facility] ([facility]) [[id number]]    Jc Cloud  is a 89 year old  (3/25/1931), admitted to the above facility from [facility] TCU. Admitted 2/14/2022.   Information obtained from patient, facility staff, chart review, son Kan Cloud who is present and 3 family members via phone conference.     HPI:    He has a medical history significant for CAD with CABG, afib, second degree AV block, biventricular cardiac pacemaker, HTN, orthostatic hypotension, etoh abuse, neuropathy, depression, anxiety, insomnia, osteoarthritis, sleep apnea, essential tremor, cognitive impairment.   He was  hospitalized at [facility] after presenting to the ED 1/24/2022 with left rib pain after a fall. CT head negative for acute pathology. CT cervical spine showed multilevel degenerative changes, no fracture or dislocation. CXR showed acute appearing fractures of the left lateral ribs 7-9, chronic rib deformity of right 6th rib and suspected small left pneumothorax. UA was not indicative of infection. BUN 39, creatinine 1.32, WBC 8.3, Hgb 13.4. Ethyl alcohol was <0.01. He was noted to have a reddish stool in the ED and guaiac was positive. Pacemaker interrogation showed normal function. Trauma team consulted and no surgical intervention indicated. Rib pain was managed with tylenol, lidocaine patch and oxycodone. Hgb remained stable and he had no s/s of GI bleed.  He discharged 1/27/2021 to [facility] tcu. Vitamin B12, folic acid and thiamine were started. Vitamin D was also started. Oxycodone was discontinued. At tcu discharge, he was ambulating 300 ft with walker and supervision. Required supervision to  "stand by assist with cares. BIMS 14/15, SBT 2/28, MOCA 16/30. Referral was made to [facility] for PHYSICAL THERAPY/OT/HHA at tcu discharge on 2/14/2022.     He was also hospitalized at [facility] 12/16-12/20/2021 after a fall. He was on the floor approx 24 hrs and had rhabdomyolysis.       He is in bed in his pajamas at 10:30 am. Meals are delivered to his apartment but he has not eaten breakfast. Very [place] which makes communication difficult. Denies feeling ill, \" I'm just fine.\" Acknowledges that he is missing meals and denies feeling hungry. No trouble chewing or swallowing and denies GI symptoms. Up frequently during the night to urinate. Denies pain or burning.  Denies pain of any type, including his ribs. Doesn't think he needs the lidocaine patches. Affect is flat and he states that he wants to return to his home. Family concerned about his mood. Son states his father has always been introverted but has become more isolative and depressed.       CODE STATUS/ADVANCE DIRECTIVES DISCUSSION:  No CPR- Pre-arrest intubation OK  DNR, short term intubation only   ALLERGIES:   Allergies   Allergen Reactions     Atenolol      Lisinopril      Meperidine      Metoprolol      Quetiapine      Triamterene       PAST MEDICAL HISTORY:   Past Medical History:   Diagnosis Date     Alcohol abuse      Anxiety      Atrial fibrillation (H)      Closed fracture of multiple ribs of left side with routine healing      Cognitive impairment      Colon, diverticulosis      Coronary artery disease      Falls frequently      Hyperlipidemia      Intestinal adhesions with obstruction (H)      Neuropathy      Orthostatic hypotension      GLORIA (obstructive sleep apnea)      Pacemaker      Primary hypertension      Primary osteoarthritis involving multiple joints      Recurrent major depressive disorder, in remission (H)      Sleep apnea       PAST SURGICAL HISTORY:   has a past surgical history that includes Bypass graft artery " coronary; hernia repair; cataract iol, rt/lt; adenoidectomy; and Open reduction internal fixation wrist (Right, 1/3/2020).  FAMILY HISTORY: family history is not on file.  SOCIAL HISTORY:   reports that he quit smoking about 40 years ago. His smoking use included cigarettes and cigars. He started smoking about 70 years ago. He has a 60.00 pack-year smoking history. He has never used smokeless tobacco. He reports current alcohol use. He reports that he does not use drugs.  Patient's living condition: lives in an assisted living facility. He was living alone in his house in [facility].  for 12 yrs. 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     Post Discharge Medication Reconciliation Status: discharge medications reconciled and changed, per note/orders  Current Outpatient Medications   Medication Sig     acetaminophen (TYLENOL) 500 MG tablet Take 2 tablets (1,000 mg) by mouth 3 times daily     apixaban ANTICOAGULANT (ELIQUIS ANTICOAGULANT) 2.5 MG tablet TAKE 1 TABLET BY MOUTH TWICE A DAY     cholecalciferol (VITAMIN D3) 10 mcg (400 units) TABS tablet Take 1 tablet (10 mcg) by mouth daily     cyanocobalamin (VITAMIN B-12) 1000 MCG tablet Take 1 tablet (1,000 mcg) by mouth daily     folic acid (FOLVITE) 1 MG tablet Take 1 tablet (1 mg) by mouth daily     gabapentin (NEURONTIN) 300 MG capsule Take 1 capsule (300 mg) by mouth 2 times daily     magnesium oxide (MAG-OX) 400 (240 Mg) MG tablet Take 1 tablet (400 mg) by mouth daily     metoprolol succinate ER (TOPROL-XL) 25 MG 24 hr tablet Take 1 tablet (25 mg) by mouth every morning AND 50 MG EVERY EVENING     midodrine (PROAMATINE) 5 MG tablet Take 1 tablet (5 mg) by mouth 2 times daily     mirtazapine (REMERON) 7.5 MG tablet Take 1 tablet (7.5 mg) by mouth At Bedtime     polyethylene glycol (MIRALAX) 17 g packet Take 1 packet by mouth daily     primidone (MYSOLINE) 50 MG tablet TAKE 1/2 TABLET BY MOUTH TWICE DAILY     sertraline (ZOLOFT) 25 MG tablet Take 1 tablet (25 mg) by  mouth daily     sodium chloride 1 GM tablet Take 1 tablet (1 g) by mouth 2 times daily     thiamine (B-1) 100 MG tablet Take 1 tablet (100 mg) by mouth daily     traZODone (DESYREL) 50 MG tablet Take 50 mg by mouth nightly as needed      No current facility-administered medications for this visit.       ROS:  4 point ROS including Respiratory, CV, GI and , other than that noted in the HPI,  is negative    Vitals:  /62   Pulse 77   Temp 97.3  F (36.3  C)   Resp 16   Wt 63.5 kg (140 lb)   BMI 19.53 kg/m    Exam:  GENERAL APPEARANCE:  Alert, in no distress, thin  ENT:  [place], oropharynx clear  EYES:  EOM normal, conjunctiva and lids normal  NECK: no adenopathy,masses or thyromegaly  RESP:  lungs clear to auscultation , no respiratory distress  CV:  regular rate and rhythm, no murmur, no edema. Palpable pacemaker  ABDOMEN:  soft, nontender, no distension, no  masses  M/S:   in bed. WILKES with good strength. No joint inflammation.  SKIN:  no visible rashes or open areas  PSYCH:  oriented X 2-3, insight and judgement impaired, memory impaired , flat affect     Lab/Diagnostic data:  Recent labs in [facility] reviewed by me today.     ASSESSMENT / PLAN:  (S22.42XD) Closed fracture of multiple ribs of left side with routine healing  (primary encounter diagnosis)  (R29.6) Falls frequently  Comment: pain has improved. Falls are multifactorial in nature with etoh abuse, poor oral intake, orthostatic hypotension, cognitive impairment and neuropathy all contributing.   Vitamin D level 50 on 1/31/2022  Plan: continue tylenol. Discontinue lidocaine patch per his request. Continue vitamin D. Home PHYSICAL THERAPY/OT as ordered.     (F33.9) Episode of recurrent major depressive disorder, unspecified depression episode severity (H)  Comment: affect is flat, though he is pleasant and conversational.   Plan: continue sertraline and prn trazodone. Start mirtazapine. Refer to psychologist prn.     (R63.0) Poor appetite  Comment: not a  new problem but worse since the move to [place]. Unclear if he's lost weight. No acute GI symptoms. Suspect this is most likely related to depression and etoh abuse.   Albumin 3.4.    Plan: discussed trial of mirtazapine with patient and family to help mood, appetite and sleep. All are in agreement. Family has supplied Ensure    (F10.10) Alcohol abuse  Comment: long standing. No withdrawal symptoms during both recent hospitalizations. Son reports his last drink was in Jan 2022.   Plan: continue thiamine, B12, folic acid. Refer to psychologist prn     (I48.20) Chronic a-fib (H)  (Z95.0) Cardiac pacemaker in situ  Comment: rate controlled. Pacemaker interrogation 1/24/2022 showed normal function   Plan: continue apixaban, metoprolol. Pacemaker check per usual schedule with [facility].     (I25.10) Coronary artery disease involving native heart without angina pectoris, unspecified vessel or lesion type  Comment: history of CABG. No acute cardiac issues   Plan: continue metoprolol.     (I10) Primary hypertension  (I95.1) Orthostatic hypotension  Comment: recent /62. Denies dizziness  Plan: continue metoprolol and midodrine. Continue sodium chloride tabs. Encourage fluids. Permissive hypertension acceptable given advanced age and fall risk.     (M89.49) Primary osteoarthritis involving multiple joints  Comment: appears comfortable and no reports of pain today   Plan: continue tylenol     (G60.9) Idiopathic peripheral neuropathy  Comment: chronic, possibly due to etoh abuse. May be contributing to his falls. Pain controlled   Plan: continue gabapentin. Therapies     Essential tremor  Comment: no hand tremor on exam   Plan: continue primidone     (R35.1) Nocturia  Comment: probable BPH. Has not seen a Urologist.   Plan: UA/UC to rule out infection. Consider tamsulosin and/or Urology referral.     (G47.30) Sleep apnea  Comment: does not use CPAP  Plan: monitor sleep, symptoms     (R41.89) Cognitive  impairment  Comment: mild to moderate deficits on cognitive testing. His short term memory is poor and he was not tracking well today. Severe hearing loss compounds cognitive deficits.   Plan: [facility] staff assistance with cares, meals, activities and med admin. Son will schedule Audiology appointment to evaluate his hearing aids.     (Z71.89) Advanced directives, counseling/discussion  Comment: discussed with patient and family. He does not want CPR but does want short term ventilator support for an acute illness   Plan: orders and POLST updated.         Total time spent with patient visit at the [place] facility was 60 mins including patient visit, review of past records and conversation with son. Greater than 50% of total time spent with counseling and coordinating care due to establishing primary care at the facility. Coordinating orders with facility staff. Counseling patient and family re: reason for hospitalization and tcu stay, medications and potential side effects, management of depression and poor appetite, plan of care, advanced directive.       Electronically signed by:  MAGNOLIA Alanis CNP

## 2022-02-24 ENCOUNTER — ASSISTED LIVING VISIT (OUTPATIENT)
Dept: GERIATRICS | Facility: CLINIC | Age: 87
End: 2022-02-24
Payer: MEDICARE

## 2022-02-24 DIAGNOSIS — R29.6 FALLS FREQUENTLY: ICD-10-CM

## 2022-02-24 DIAGNOSIS — R41.89 COGNITIVE IMPAIRMENT: ICD-10-CM

## 2022-02-24 DIAGNOSIS — I95.1 ORTHOSTATIC HYPOTENSION: ICD-10-CM

## 2022-02-24 DIAGNOSIS — F10.10 ALCOHOL ABUSE: ICD-10-CM

## 2022-02-24 DIAGNOSIS — R63.0 POOR APPETITE: ICD-10-CM

## 2022-02-24 DIAGNOSIS — F33.9 EPISODE OF RECURRENT MAJOR DEPRESSIVE DISORDER, UNSPECIFIED DEPRESSION EPISODE SEVERITY (H): ICD-10-CM

## 2022-02-24 DIAGNOSIS — Z71.89 ADVANCED DIRECTIVES, COUNSELING/DISCUSSION: ICD-10-CM

## 2022-02-24 DIAGNOSIS — R35.1 NOCTURIA: ICD-10-CM

## 2022-02-24 DIAGNOSIS — M15.0 PRIMARY OSTEOARTHRITIS INVOLVING MULTIPLE JOINTS: ICD-10-CM

## 2022-02-24 DIAGNOSIS — G25.0 ESSENTIAL TREMOR: ICD-10-CM

## 2022-02-24 DIAGNOSIS — I25.10 CORONARY ARTERY DISEASE INVOLVING NATIVE HEART WITHOUT ANGINA PECTORIS, UNSPECIFIED VESSEL OR LESION TYPE: ICD-10-CM

## 2022-02-24 DIAGNOSIS — G47.30 SLEEP APNEA: ICD-10-CM

## 2022-02-24 DIAGNOSIS — G60.9 IDIOPATHIC PERIPHERAL NEUROPATHY: ICD-10-CM

## 2022-02-24 DIAGNOSIS — I48.20 CHRONIC A-FIB (H): ICD-10-CM

## 2022-02-24 DIAGNOSIS — Z95.0 CARDIAC PACEMAKER IN SITU: ICD-10-CM

## 2022-02-24 DIAGNOSIS — S22.42XD CLOSED FRACTURE OF MULTIPLE RIBS OF LEFT SIDE WITH ROUTINE HEALING: Primary | ICD-10-CM

## 2022-02-24 DIAGNOSIS — I10 PRIMARY HYPERTENSION: ICD-10-CM

## 2022-02-24 RX ORDER — MIRTAZAPINE 7.5 MG/1
7.5 TABLET, FILM COATED ORAL AT BEDTIME
Qty: 30 TABLET | Refills: 11 | Status: SHIPPED | OUTPATIENT
Start: 2022-02-24 | End: 2022-03-17 | Stop reason: DRUGHIGH

## 2022-02-24 NOTE — LETTER
2/24/2022        RE: Jc Cloud  [facility]  [address]  [facility] [address]        [facility] GERIATRICS    PRIMARY CARE PROVIDER AND CLINIC:  [facility], [address] / [facility] [address]  Chief Complaint   Patient presents with     [facility] Medical Record Number:  [id number]  Place of Service where encounter took place:  THE [facility] ([facility]) [[id number]]    Jc Cloud  is a 89 year old  (3/25/1931), admitted to the above facility from [facility] TCU. Admitted 2/14/2022.   Information obtained from patient, facility staff, chart review, son Kan Cloud who is present and 3 family members via phone conference.     HPI:    He has a medical history significant for CAD with CABG, afib, second degree AV block, biventricular cardiac pacemaker, HTN, orthostatic hypotension, etoh abuse, neuropathy, depression, anxiety, insomnia, osteoarthritis, sleep apnea, essential tremor, cognitive impairment.   He was  hospitalized at [facility] after presenting to the ED 1/24/2022 with left rib pain after a fall. CT head negative for acute pathology. CT cervical spine showed multilevel degenerative changes, no fracture or dislocation. CXR showed acute appearing fractures of the left lateral ribs 7-9, chronic rib deformity of right 6th rib and suspected small left pneumothorax. UA was not indicative of infection. BUN 39, creatinine 1.32, WBC 8.3, Hgb 13.4. Ethyl alcohol was <0.01. He was noted to have a reddish stool in the ED and guaiac was positive. Pacemaker interrogation showed normal function. Trauma team consulted and no surgical intervention indicated. Rib pain was managed with tylenol, lidocaine patch and oxycodone. Hgb remained stable and he had no s/s of GI bleed.  [facility] Vitamin B12, folic acid and thiamine were started. Vitamin D was also started. Oxycodone  "was discontinued. At tcu discharge, he was ambulating 300 ft with walker and supervision. Required supervision to stand by assist with cares. BIMS 14/15, SBT 2/28, MOCA 16/30. Referral was made to [facility] for PHYSICAL THERAPY/OT/HHA at tcu discharge on 2/14/2022.     He was also hospitalized at [facility] 12/16-12/20/2021 after a fall. He was on the floor approx 24 hrs and had rhabdomyolysis.       He is in bed in his pajamas at 10:30 am. Meals are delivered to his apartment but he has not eaten breakfast. Very [place] which makes communication difficult. Denies feeling ill, \" I'm just fine.\" Acknowledges that he is missing meals and denies feeling hungry. No trouble chewing or swallowing and denies GI symptoms. Up frequently during the night to urinate. Denies pain or burning.  Denies pain of any type, including his ribs. Doesn't think he needs the lidocaine patches. Affect is flat and he states that he wants to return to his home. Family concerned about his mood. Son states his father has always been introverted but has become more isolative and depressed.       CODE STATUS/ADVANCE DIRECTIVES DISCUSSION:  No CPR- Pre-arrest intubation OK  DNR, short term intubation only   ALLERGIES:   Allergies   Allergen Reactions     Atenolol      Lisinopril      Meperidine      Metoprolol      Quetiapine      Triamterene       PAST MEDICAL HISTORY:   Past Medical History:   Diagnosis Date     Alcohol abuse      Anxiety      Atrial fibrillation (H)      Closed fracture of multiple ribs of left side with routine healing      Cognitive impairment      Colon, diverticulosis      Coronary artery disease      Falls frequently      Hyperlipidemia      Intestinal adhesions with obstruction (H)      Neuropathy      Orthostatic hypotension      GLORIA (obstructive sleep apnea)      Pacemaker      Primary hypertension      Primary osteoarthritis involving multiple joints      Recurrent major depressive disorder, in remission (H)      " Sleep apnea       PAST SURGICAL HISTORY:   has a past surgical history that includes Bypass graft artery coronary; hernia repair; cataract iol, rt/lt; adenoidectomy; and Open reduction internal fixation wrist (Right, 1/3/2020).  FAMILY HISTORY: family history is not on file.  SOCIAL HISTORY:   reports that he quit smoking about 40 years ago. His smoking use included cigarettes and cigars. He started smoking about 70 years ago. He has a 60.00 pack-year smoking history. He has never used smokeless tobacco. He reports current alcohol use. He reports that he does not use drugs.  Patient's living condition: lives in an assisted living facility. He was living alone in his house in [place].  for 12 yrs. 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     Post Discharge Medication Reconciliation Status: discharge medications reconciled and changed, per note/orders  Current Outpatient Medications   Medication Sig     acetaminophen (TYLENOL) 500 MG tablet Take 2 tablets (1,000 mg) by mouth 3 times daily     apixaban ANTICOAGULANT (ELIQUIS ANTICOAGULANT) 2.5 MG tablet TAKE 1 TABLET BY MOUTH TWICE A DAY     cholecalciferol (VITAMIN D3) 10 mcg (400 units) TABS tablet Take 1 tablet (10 mcg) by mouth daily     cyanocobalamin (VITAMIN B-12) 1000 MCG tablet Take 1 tablet (1,000 mcg) by mouth daily     folic acid (FOLVITE) 1 MG tablet Take 1 tablet (1 mg) by mouth daily     gabapentin (NEURONTIN) 300 MG capsule Take 1 capsule (300 mg) by mouth 2 times daily     magnesium oxide (MAG-OX) 400 (240 Mg) MG tablet Take 1 tablet (400 mg) by mouth daily     metoprolol succinate ER (TOPROL-XL) 25 MG 24 hr tablet Take 1 tablet (25 mg) by mouth every morning AND 50 MG EVERY EVENING     midodrine (PROAMATINE) 5 MG tablet Take 1 tablet (5 mg) by mouth 2 times daily     mirtazapine (REMERON) 7.5 MG tablet Take 1 tablet (7.5 mg) by mouth At Bedtime     polyethylene glycol (MIRALAX) 17 g packet Take 1 packet by mouth daily     primidone (MYSOLINE) 50 MG  tablet TAKE 1/2 TABLET BY MOUTH TWICE DAILY     sertraline (ZOLOFT) 25 MG tablet Take 1 tablet (25 mg) by mouth daily     sodium chloride 1 GM tablet Take 1 tablet (1 g) by mouth 2 times daily     thiamine (B-1) 100 MG tablet Take 1 tablet (100 mg) by mouth daily     traZODone (DESYREL) 50 MG tablet Take 50 mg by mouth nightly as needed      No current facility-administered medications for this visit.       ROS:  4 point ROS including Respiratory, CV, GI and , other than that noted in the HPI,  is negative    Vitals:  /62   Pulse 77   Temp 97.3  F (36.3  C)   Resp 16   Wt 63.5 kg (140 lb)   BMI 19.53 kg/m    Exam:  GENERAL APPEARANCE:  Alert, in no distress, thin  ENT:  [place], oropharynx clear  EYES:  EOM normal, conjunctiva and lids normal  NECK: no adenopathy,masses or thyromegaly  RESP:  lungs clear to auscultation , no respiratory distress  CV:  regular rate and rhythm, no murmur, no edema. Palpable pacemaker  ABDOMEN:  soft, nontender, no distension, no  masses  M/S:   in bed. WILKES with good strength. No joint inflammation.  SKIN:  no visible rashes or open areas  PSYCH:  oriented X 2-3, insight and judgement impaired, memory impaired , flat affect     Lab/Diagnostic data:  [facility].     ASSESSMENT / PLAN:  (S22.42XD) Closed fracture of multiple ribs of left side with routine healing  (primary encounter diagnosis)  (R29.6) Falls frequently  Comment: pain has improved. Falls are multifactorial in nature with etoh abuse, poor oral intake, orthostatic hypotension, cognitive impairment and neuropathy all contributing.   Vitamin D level 50 on 1/31/2022  Plan: continue tylenol. Discontinue lidocaine patch per his request. Continue vitamin D. Home PHYSICAL THERAPY/OT as ordered.     (F33.9) Episode of recurrent major depressive disorder, unspecified depression episode severity (H)  Comment: affect is flat, though he is pleasant and conversational.   Plan: continue sertraline  and prn trazodone. Start mirtazapine. Refer to psychologist prn.     (R63.0) Poor appetite  Comment: not a new problem but worse since the move to [place]. Unclear if he's lost weight. No acute GI symptoms. Suspect this is most likely related to depression and etoh abuse.   Albumin 3.4.    Plan: discussed trial of mirtazapine with patient and family to help mood, appetite and sleep. All are in agreement. Family has supplied Ensure    (F10.10) Alcohol abuse  Comment: long standing. No withdrawal symptoms during both recent hospitalizations. Son reports his last drink was in Jan 2022.   Plan: continue thiamine, B12, folic acid. Refer to psychologist prn     (I48.20) Chronic a-fib (H)  (Z95.0) Cardiac pacemaker in situ  Comment: rate controlled. Pacemaker interrogation 1/24/2022 showed normal function   Plan: continue apixaban, metoprolol. Pacemaker check per usual schedule with [facility].     (I25.10) Coronary artery disease involving native heart without angina pectoris, unspecified vessel or lesion type  Comment: history of CABG. No acute cardiac issues   Plan: continue metoprolol.     (I10) Primary hypertension  (I95.1) Orthostatic hypotension  Comment: recent /62. Denies dizziness  Plan: continue metoprolol and midodrine. Continue sodium chloride tabs. Encourage fluids. Permissive hypertension acceptable given advanced age and fall risk.     (M89.49) Primary osteoarthritis involving multiple joints  Comment: appears comfortable and no reports of pain today   Plan: continue tylenol     (G60.9) Idiopathic peripheral neuropathy  Comment: chronic, possibly due to etoh abuse. May be contributing to his falls. Pain controlled   Plan: continue gabapentin. Therapies     Essential tremor  Comment: no hand tremor on exam   Plan: continue primidone     (R35.1) Nocturia  Comment: probable BPH. Has not seen a Urologist.   Plan: UA/UC to rule out infection. Consider tamsulosin and/or Urology referral.      (G47.30) Sleep apnea  Comment: does not use CPAP  Plan: monitor sleep, symptoms     (R41.89) Cognitive impairment  Comment: mild to moderate deficits on cognitive testing. His short term memory is poor and he was not tracking well today. Severe hearing loss compounds cognitive deficits.   Plan: [place] staff assistance with cares, meals, activities and med admin. Son will schedule Audiology appointment to evaluate his hearing aids.     (Z71.89) Advanced directives, counseling/discussion  Comment: discussed with patient and family. He does not want CPR but does want short term ventilator support for an acute illness   Plan: orders and POLST updated.         Total time spent with patient visit at the [place] facility was 60 mins including patient visit, review of past records and conversation with son. Greater than 50% of total time spent with counseling and coordinating care due to establishing primary care at the facility. Coordinating orders with facility staff. Counseling patient and family re: reason for hospitalization and tcu stay, medications and potential side effects, management of depression and poor appetite, plan of care, advanced directive.       Electronically signed by:  MAGNOLIA Alanis CNP                       Documentation of Face-to-Face and Certification for Home Health Services     Patient: Jc Cloud   YOB: 1931  MR Number: [id number]  Today's Date: 3/7/2022    I certify that patient: Jc Cloud is under my care and that I, or a nurse practitioner or physician's assistant working with me, had a face-to-face encounter that meets the physician face-to-face encounter requirements with this patient on: 2/24/2022.    This encounter with the patient was in whole, or in part, for the following medical condition, which is the primary reason for home health care: frequent falls, rib fractures.    I certify that, based on my findings, the following services are medically  necessary home health services: Occupational Therapy, Physical Therapy and HHA.    My clinical findings support the need for the above services because: Occupational Therapy Services are needed to assess and treat cognitive ability and address ADL safety due to impairment in functional status and cognition . and Physical Therapy Services are needed to assess and treat the following functional impairments: gait instability, falls.    Further, I certify that my clinical findings support that this patient is homebound (i.e. absences from home require considerable and taxing effort and are for medical reasons or [employer] services or infrequently or of short duration when for other reasons) because: Requires assistance of another person or specialized equipment to access medical services because patient: Is prone to wander/get lost without assistance., Is unable to exit home safely on own due to: cognitive impairment, gait instability. and Is unable to walk greater than 300 feet without rest...    Based on the above findings. I certify that this patient is confined to the home and needs intermittent skilled nursing care, physical therapy and/or speech therapy.  The patient is under my care, and I have initiated the establishment of the plan of care.  This patient will be followed by a physician who will periodically review the plan of care.  Physician/Provider to provide follow up care: David Muhammad    Responsible Medicare certified PECOS Physician: Dr.Sara Dez MD, signing F2F and only signing for initial order. Please send all follow up questions and concerns or needed follow up signatures to the PCP, who [facility] has on file as:  David Muhammad.  Physician Signature: See electronic signature associated with these discharge orders.  Date: 3/7/2022          Sincerely,        MAGNOLIA Alanis CNP

## 2022-02-25 ENCOUNTER — LAB REQUISITION (OUTPATIENT)
Dept: LAB | Facility: CLINIC | Age: 87
End: 2022-02-25
Payer: MEDICARE

## 2022-02-25 LAB
ALBUMIN UR-MCNC: 30 MG/DL
APPEARANCE UR: CLEAR
BILIRUB UR QL STRIP: NEGATIVE
COLOR UR AUTO: ABNORMAL
GLUCOSE UR STRIP-MCNC: NEGATIVE MG/DL
HGB UR QL STRIP: NEGATIVE
HYALINE CASTS: 5 /LPF
KETONES UR STRIP-MCNC: NEGATIVE MG/DL
LEUKOCYTE ESTERASE UR QL STRIP: NEGATIVE
MUCOUS THREADS #/AREA URNS LPF: PRESENT /LPF
NITRATE UR QL: NEGATIVE
PH UR STRIP: 5.5 [PH] (ref 5–7)
RBC URINE: <1 /HPF
SP GR UR STRIP: 1.02 (ref 1–1.03)
SQUAMOUS EPITHELIAL: <1 /HPF
UROBILINOGEN UR STRIP-MCNC: <2 MG/DL
WBC URINE: <1 /HPF

## 2022-02-25 PROCEDURE — 81001 URINALYSIS AUTO W/SCOPE: CPT | Mod: ORL

## 2022-02-25 PROCEDURE — 81001 URINALYSIS AUTO W/SCOPE: CPT | Mod: ORL | Performed by: NURSE PRACTITIONER

## 2022-02-25 PROCEDURE — 87086 URINE CULTURE/COLONY COUNT: CPT | Mod: ORL | Performed by: NURSE PRACTITIONER

## 2022-02-25 PROCEDURE — 87086 URINE CULTURE/COLONY COUNT: CPT | Mod: ORL

## 2022-02-26 LAB — BACTERIA UR CULT: NO GROWTH

## 2022-02-28 ENCOUNTER — TELEPHONE (OUTPATIENT)
Dept: GERIATRICS | Facility: CLINIC | Age: 87
End: 2022-02-28
Payer: MEDICARE

## 2022-02-28 DIAGNOSIS — R25.1 TREMOR: ICD-10-CM

## 2022-02-28 DIAGNOSIS — R52 PAIN: Primary | ICD-10-CM

## 2022-02-28 DIAGNOSIS — D64.9 ANEMIA, UNSPECIFIED TYPE: ICD-10-CM

## 2022-02-28 DIAGNOSIS — E87.1 HYPONATREMIA: ICD-10-CM

## 2022-02-28 DIAGNOSIS — I48.20 CHRONIC A-FIB (H): ICD-10-CM

## 2022-02-28 DIAGNOSIS — F32.A DEPRESSION, UNSPECIFIED DEPRESSION TYPE: ICD-10-CM

## 2022-02-28 RX ORDER — SODIUM CHLORIDE 1 G/1
1 TABLET ORAL 2 TIMES DAILY
Qty: 62 TABLET | Refills: 98 | Status: SHIPPED | OUTPATIENT
Start: 2022-02-28 | End: 2022-08-12

## 2022-02-28 RX ORDER — PRIMIDONE 50 MG/1
TABLET ORAL
Qty: 30 TABLET | Refills: 11 | Status: SHIPPED | OUTPATIENT
Start: 2022-02-28 | End: 2023-01-01

## 2022-02-28 RX ORDER — ACETAMINOPHEN 500 MG
1000 TABLET ORAL 3 TIMES DAILY
Qty: 186 TABLET | Refills: 98 | Status: SHIPPED | OUTPATIENT
Start: 2022-02-28 | End: 2022-06-11

## 2022-02-28 RX ORDER — METOPROLOL SUCCINATE 25 MG/1
25 TABLET, EXTENDED RELEASE ORAL EVERY MORNING
Qty: 93 TABLET | Refills: 98 | Status: SHIPPED | OUTPATIENT
Start: 2022-02-28 | End: 2023-01-01

## 2022-02-28 RX ORDER — MIDODRINE HYDROCHLORIDE 5 MG/1
5 TABLET ORAL 2 TIMES DAILY
Qty: 62 TABLET | Refills: 98 | Status: SHIPPED | OUTPATIENT
Start: 2022-02-28 | End: 2023-01-01

## 2022-02-28 RX ORDER — FOLIC ACID 1 MG/1
1 TABLET ORAL DAILY
Qty: 31 TABLET | Refills: 98 | Status: SHIPPED | OUTPATIENT
Start: 2022-02-28 | End: 2022-09-22

## 2022-02-28 RX ORDER — GABAPENTIN 300 MG/1
300 CAPSULE ORAL 2 TIMES DAILY
Qty: 62 CAPSULE | Refills: 98 | Status: SHIPPED | OUTPATIENT
Start: 2022-02-28 | End: 2023-01-01

## 2022-02-28 RX ORDER — LANOLIN ALCOHOL/MO/W.PET/CERES
1000 CREAM (GRAM) TOPICAL DAILY
Qty: 31 TABLET | Refills: 98 | Status: SHIPPED | OUTPATIENT
Start: 2022-02-28 | End: 2022-09-22

## 2022-02-28 RX ORDER — UBIDECARENONE 75 MG
100 CAPSULE ORAL DAILY
Qty: 31 TABLET | Refills: 98 | Status: SHIPPED | OUTPATIENT
Start: 2022-02-28 | End: 2022-03-07

## 2022-02-28 RX ORDER — SERTRALINE HYDROCHLORIDE 25 MG/1
25 TABLET, FILM COATED ORAL DAILY
Qty: 31 TABLET | Refills: 98 | Status: SHIPPED | OUTPATIENT
Start: 2022-02-28 | End: 2022-08-12

## 2022-02-28 RX ORDER — LANOLIN ALCOHOL/MO/W.PET/CERES
400 CREAM (GRAM) TOPICAL DAILY
Qty: 31 TABLET | Refills: 98 | Status: SHIPPED | OUTPATIENT
Start: 2022-02-28 | End: 2023-01-01

## 2022-02-28 NOTE — TELEPHONE ENCOUNTER
"Good afternoon David Muhammad,    We asked for refill approvals for all of the medications Jc would need for this upcoming cycle fill but we received them all this morning denied with a response of \"fill/refill not appropriate\". I am asking for more detail as to why our requests were denied so I can work on getting approvals.    Thank you for your time and looking forward to your response.      Ashwini Mcrae [facility]  [address] [place] [id number]  [email or web address]  Pharmacy: [phone] Fax: [phone]    "

## 2022-03-07 ENCOUNTER — TELEPHONE (OUTPATIENT)
Dept: GERIATRICS | Facility: CLINIC | Age: 87
End: 2022-03-07
Payer: MEDICARE

## 2022-03-07 PROBLEM — I95.1 ORTHOSTATIC HYPOTENSION: Status: ACTIVE | Noted: 2022-03-07

## 2022-03-07 PROBLEM — F10.10 ALCOHOL ABUSE: Status: ACTIVE | Noted: 2022-03-07

## 2022-03-07 PROBLEM — R41.89 COGNITIVE IMPAIRMENT: Status: ACTIVE | Noted: 2022-03-07

## 2022-03-07 PROBLEM — R29.6 FALLS FREQUENTLY: Status: ACTIVE | Noted: 2022-03-07

## 2022-03-07 PROBLEM — F33.40 RECURRENT MAJOR DEPRESSIVE DISORDER, IN REMISSION (H): Status: ACTIVE | Noted: 2022-03-07

## 2022-03-07 PROBLEM — S22.42XD CLOSED FRACTURE OF MULTIPLE RIBS OF LEFT SIDE WITH ROUTINE HEALING: Status: ACTIVE | Noted: 2022-03-07

## 2022-03-07 PROBLEM — I10 PRIMARY HYPERTENSION: Status: ACTIVE | Noted: 2022-03-07

## 2022-03-07 PROBLEM — I25.10 CORONARY ARTERY DISEASE: Status: ACTIVE | Noted: 2022-03-07

## 2022-03-07 PROBLEM — E78.5 HYPERLIPIDEMIA: Status: ACTIVE | Noted: 2022-03-07

## 2022-03-07 PROBLEM — Z95.0 PACEMAKER: Status: ACTIVE | Noted: 2022-03-07

## 2022-03-07 PROBLEM — M15.0 PRIMARY OSTEOARTHRITIS INVOLVING MULTIPLE JOINTS: Status: ACTIVE | Noted: 2022-03-07

## 2022-03-07 PROBLEM — F33.9 EPISODE OF RECURRENT MAJOR DEPRESSIVE DISORDER, UNSPECIFIED DEPRESSION EPISODE SEVERITY (H): Status: ACTIVE | Noted: 2022-03-07

## 2022-03-07 PROBLEM — G62.9 NEUROPATHY: Status: ACTIVE | Noted: 2022-03-07

## 2022-03-07 PROBLEM — G47.33 OSA (OBSTRUCTIVE SLEEP APNEA): Status: ACTIVE | Noted: 2022-03-07

## 2022-03-07 PROBLEM — I48.91 ATRIAL FIBRILLATION (H): Status: ACTIVE | Noted: 2022-03-07

## 2022-03-07 PROBLEM — G47.30 SLEEP APNEA: Status: ACTIVE | Noted: 2022-03-07

## 2022-03-07 PROBLEM — F41.9 ANXIETY: Status: ACTIVE | Noted: 2022-03-07

## 2022-03-07 RX ORDER — LANOLIN ALCOHOL/MO/W.PET/CERES
100 CREAM (GRAM) TOPICAL DAILY
Qty: 30 TABLET | Refills: 11 | Status: SHIPPED | OUTPATIENT
Start: 2022-03-07 | End: 2022-09-22

## 2022-03-07 NOTE — TELEPHONE ENCOUNTER
"I HAVE AN ORDER FOR A MEDICATION THAT NEEDS REFILL APPROVAL FOR CYCLE FILL BUT THE MEDICATION IN [facility] UNDER \"MEDS & ORDERS\" DOES NOT MATCH THE ORDER THE PHARMACY HAS ON FILE.     ORIGINAL DATE: 02/11/2022  Product Name VITAMIN B-1  TAB  Strength 100MG  DIRECTIONS: TAKE 1 TABLET BY MOUTH ONCE DAILY        THE ORDER IN [facility] READS...        VITAMIN B-1 100MG IS THIAMINE. VITAMIN B-12 1000 MCG IS CYANOCOBALAMIN. Epic HAS CYANOCOBALAMIN LISTED TWO TIMES WITH DIFFERENT STRENGTHS. I THINK  MCG ONE IN [facility] IS SUPPOSED TO BE THIAMINE 100MG.     PLEASE CLARIFY ORDER WITH A CLARIFICATION E RX TO THE PHARMACY OR PHONE CALL. THANK YOU.     Ashwini Mcrae [facility]  [address] [place] [id number]  [email or web address]  Pharmacy: [phone] Fax: [phone]  "

## 2022-03-08 NOTE — PROGRESS NOTES
Documentation of Face-to-Face and Certification for Home Health Services     Patient: Jc Cloud   YOB: 1931  MR Number: [id number]  Today's Date: 3/7/2022    I certify that patient: Jc Cloud is under my care and that I, or a nurse practitioner or physician's assistant working with me, had a face-to-face encounter that meets the physician face-to-face encounter requirements with this patient on: 2/24/2022.    This encounter with the patient was in whole, or in part, for the following medical condition, which is the primary reason for home health care: frequent falls, rib fractures.    I certify that, based on my findings, the following services are medically necessary home health services: Occupational Therapy, Physical Therapy and HHA.    My clinical findings support the need for the above services because: Occupational Therapy Services are needed to assess and treat cognitive ability and address ADL safety due to impairment in functional status and cognition . and Physical Therapy Services are needed to assess and treat the following functional impairments: gait instability, falls.    Further, I certify that my clinical findings support that this patient is homebound (i.e. absences from home require considerable and taxing effort and are for medical reasons or Restorationism services or infrequently or of short duration when for other reasons) because: Requires assistance of another person or specialized equipment to access medical services because patient: Is prone to wander/get lost without assistance., Is unable to exit home safely on own due to: cognitive impairment, gait instability. and Is unable to walk greater than 300 feet without rest...    Based on the above findings. I certify that this patient is confined to the home and needs intermittent skilled nursing care, physical therapy and/or speech therapy.  The patient is under my care, and I have initiated the establishment of the  plan of care.  This patient will be followed by a physician who will periodically review the plan of care.  Physician/Provider to provide follow up care: David Muhammad    Responsible Medicare certified PECOS Physician: Dr.Sara Dez MD, signing F2F and only signing for initial order. Please send all follow up questions and concerns or needed follow up signatures to the PCP, who [facility] has on file as:  David Muhammad.  Physician Signature: See electronic signature associated with these discharge orders.  Date: 3/7/2022

## 2022-03-16 VITALS
BODY MASS INDEX: 20.78 KG/M2 | RESPIRATION RATE: 16 BRPM | SYSTOLIC BLOOD PRESSURE: 122 MMHG | WEIGHT: 149 LBS | HEART RATE: 77 BPM | DIASTOLIC BLOOD PRESSURE: 62 MMHG | TEMPERATURE: 96.9 F

## 2022-03-16 NOTE — PROGRESS NOTES
[facility] GERIATRICS    Chief Complaint   Patient presents with     RECHECK     HPI:  Jc Cloud is a 89 year old  (3/25/1931), who is being seen today for an episodic care visit at: THE [facility]) [[id number]].  He moved to this facility 2/14/2022 for a higher level of care than could be managed at home. Medical history significant for CAD s/p CABG, afib, second degree AV block, biventricular cardiac pacemaker, HTN, orthostatic hypotension, etoh abuse, neuropathy, depression, anxiety, insomnia, osteoarthritis, sleep apnea, essential tremor, cognitive impairment.  He was  hospitalized at [facility] 1/24-1/27/2022 after a fall at home resulting in left rib fractures. CT head negative for acute pathology. CT cervical spine showed multilevel degenerative changes, no fracture or dislocation. CXR showed acute appearing fractures of the left lateral ribs 7-9, chronic rib deformity of right 6th rib and suspected small left pneumothorax. He was noted to have a reddish stool in the ED and guaiac was positive. Pacemaker interrogation showed normal function. Rib pain was managed with tylenol, lidocaine patch and oxycodone. Hgb remained stable and he had no s/s of GI bleed.  He discharged 1/27/2021 to [facility] tcu. Vitamin B12, folic acid and thiamine were started. Vitamin D was also started. Oxycodone was discontinued. At tcu discharge, he was ambulating 300 ft with walker and supervision. Required supervision to stand by assist with cares. BIMS 14/15, SBT 2/28, MOCA 16/30.       Today's concern is:      Closed fracture of multiple ribs of left side with routine healing  Episode of recurrent major depressive disorder, unspecified depression episode severity (H)  Poor appetite  Chronic a-fib (H)  Cardiac pacemaker in situ  Coronary artery disease involving native heart without angina pectoris, unspecified vessel or lesion type  Primary hypertension  Orthostatic hypotension  Primary  "osteoarthritis involving multiple joints  Idiopathic peripheral neuropathy  Cognitive impairment   He is sitting up in his den,  watching tv and finishing breakfast (it's after 10 am). Reports the move here is \"getting a little better.\" Pleasant and more conversational than on our first visit. Reports his appetite has improved and he feels he's getting enough to eat. Denies rib pain or respiratory symptoms. Reports nocturia and frequent urination has also improved. Denies feeling dizzy or lightheaded. No falls.       Allergies, and PMH/PSH reviewed in EPIC today    REVIEW OF SYSTEMS:  4 point ROS including Respiratory, CV, GI and , other than that noted in the HPI,  is negative    Objective:   /62   Pulse 77   Temp 96.9  F (36.1  C)   Resp 16   Wt 67.6 kg (149 lb)   BMI 20.78 kg/m    GENERAL APPEARANCE:  Alert, in no distress, thin  ENT:  [place], oropharynx clear  EYES:  EOM normal, conjunctiva and lids normal  NECK: no adenopathy,masses or thyromegaly  RESP:  lungs clear to auscultation , no respiratory distress  CV:  regular rate and rhythm, no murmur, no edema. Palpable pacemaker  ABDOMEN:  soft, nontender, no distension, no  masses  M/S: up in chair.  WILKES with good strength. No joint inflammation.  SKIN:  no rashes or open areas  PSYCH:  oriented X 2-3, insight and judgement impaired, memory impaired, mood normal     Recent labs in [facility] reviewed by me today.     ASSESSMENT / PLAN:  (S22.42XD) Closed fracture of multiple ribs of left side with routine healing  (primary encounter diagnosis)  Comment: pain resolved. No falls since [place] admission   Plan: continue tylenol. Continue vitamin D.     (F33.9) Episode of recurrent major depressive disorder, unspecified depression episode severity (H)  (R63.0) Poor appetite  Comment: appears in better mood and appetite has improved.   Plan: increase mirtazapine to 15 mg HS. Continue sertraline.   Discussed with son Kan Cloud who agrees with plan of care. "     (I48.20) Chronic a-fib (H)  (Z95.0) Cardiac pacemaker in situ  Comment: rate controlled.Pacemaker interrogation 1/24/2022 showed normal function  Plan: continue metoprolol, apixaban. Follow up with [facility] as scheduled     (I25.10) Coronary artery disease involving native heart without angina pectoris, unspecified vessel or lesion type  Comment: s/p CABG. No acute issues   Plan: continue metoprolol     (I10) Primary hypertension  (I95.1) Orthostatic hypotension  Comment: controlled with recent /62    Plan: continue metoprolol, midodrine. Continue sodium chloride tabs for now. Encourage fluids.     (M89.49) Primary osteoarthritis involving multiple joints  (G60.9) Idiopathic peripheral neuropathy  Comment: pain controlled   Plan: continue tylenol, gabapentin     (R35.1) Nocturia  Comment: probable BPH. Symptoms improved per his report. UC 2/25/2022 with no growth   Plan: consider tamsulosin and/or Urology referral if symptoms recur    (F10.10) Alcohol abuse  Comment: no etoh use since coming to [place]  Plan: continue thiamine, B12, folic acid     (R41.89) Cognitive impairment  Comment: mild to moderate deficits on cognitive testing. Conversation is appropriate   BIMS 14/15, SBT 2/28, MOCA 16/30  Plan: [place] staff assistance with cares, meals, activities and med admin.         Electronically signed by: MAGNOLIA Alanis CNP

## 2022-03-17 ENCOUNTER — ASSISTED LIVING VISIT (OUTPATIENT)
Dept: GERIATRICS | Facility: CLINIC | Age: 87
End: 2022-03-17
Payer: MEDICARE

## 2022-03-17 DIAGNOSIS — Z95.0 CARDIAC PACEMAKER IN SITU: ICD-10-CM

## 2022-03-17 DIAGNOSIS — I25.10 CORONARY ARTERY DISEASE INVOLVING NATIVE HEART WITHOUT ANGINA PECTORIS, UNSPECIFIED VESSEL OR LESION TYPE: ICD-10-CM

## 2022-03-17 DIAGNOSIS — I48.20 CHRONIC A-FIB (H): ICD-10-CM

## 2022-03-17 DIAGNOSIS — R41.89 COGNITIVE IMPAIRMENT: ICD-10-CM

## 2022-03-17 DIAGNOSIS — M15.0 PRIMARY OSTEOARTHRITIS INVOLVING MULTIPLE JOINTS: ICD-10-CM

## 2022-03-17 DIAGNOSIS — R35.1 NOCTURIA: ICD-10-CM

## 2022-03-17 DIAGNOSIS — I10 PRIMARY HYPERTENSION: ICD-10-CM

## 2022-03-17 DIAGNOSIS — F10.10 ALCOHOL ABUSE: ICD-10-CM

## 2022-03-17 DIAGNOSIS — I95.1 ORTHOSTATIC HYPOTENSION: ICD-10-CM

## 2022-03-17 DIAGNOSIS — K59.00 CONSTIPATION: Primary | ICD-10-CM

## 2022-03-17 DIAGNOSIS — S22.42XD CLOSED FRACTURE OF MULTIPLE RIBS OF LEFT SIDE WITH ROUTINE HEALING: Primary | ICD-10-CM

## 2022-03-17 DIAGNOSIS — F33.9 EPISODE OF RECURRENT MAJOR DEPRESSIVE DISORDER, UNSPECIFIED DEPRESSION EPISODE SEVERITY (H): ICD-10-CM

## 2022-03-17 DIAGNOSIS — R63.0 POOR APPETITE: ICD-10-CM

## 2022-03-17 DIAGNOSIS — G60.9 IDIOPATHIC PERIPHERAL NEUROPATHY: ICD-10-CM

## 2022-03-17 RX ORDER — POLYETHYLENE GLYCOL 3350 17 G/17G
17 POWDER, FOR SOLUTION ORAL DAILY
Qty: 510 G | Refills: 11 | Status: SHIPPED | OUTPATIENT
Start: 2022-03-17 | End: 2023-01-01

## 2022-03-17 RX ORDER — MIRTAZAPINE 15 MG/1
15 TABLET, FILM COATED ORAL AT BEDTIME
COMMUNITY
End: 2022-03-17

## 2022-03-17 RX ORDER — MIRTAZAPINE 15 MG/1
15 TABLET, FILM COATED ORAL AT BEDTIME
Qty: 30 TABLET | Refills: 11 | Status: SHIPPED | OUTPATIENT
Start: 2022-03-17 | End: 2023-01-01

## 2022-03-17 NOTE — LETTER
3/17/2022        RE: Jc Cloud  [facility]  [address] [facility] [address]        [facility] GERIATRICS    Chief Complaint   Patient presents with     RECHECK     HPI:  Jc Cloud is a 89 year old  (3/25/1931), who is being seen today for an episodic care visit at: THE [facility] ([facility]) [[id number]].  He moved to this facility 2/14/2022 for a higher level of care than could be managed at home. Medical history significant for CAD s/p CABG, afib, second degree AV block, biventricular cardiac pacemaker, HTN, orthostatic hypotension, etoh abuse, neuropathy, depression, anxiety, insomnia, osteoarthritis, sleep apnea, essential tremor, cognitive impairment.  He was  hospitalized at [facility] 1/24-1/27/2022 after a fall at home resulting in left rib fractures. CT head negative for acute pathology. CT cervical spine showed multilevel degenerative changes, no fracture or dislocation. CXR showed acute appearing fractures of the left lateral ribs 7-9, chronic rib deformity of right 6th rib and suspected small left pneumothorax. He was noted to have a reddish stool in the ED and guaiac was positive. Pacemaker interrogation showed normal function. Rib pain was managed with tylenol, lidocaine patch and oxycodone. Hgb remained stable and he had no s/s of GI bleed.  He discharged 1/27/2021 to [facility] tcu. Vitamin B12, folic acid and thiamine were started. Vitamin D was also started. Oxycodone was discontinued. At tcu discharge, he was ambulating 300 ft with walker and supervision. Required supervision to stand by assist with cares. BIMS 14/15, SBT 2/28, MOCA 16/30.       Today's concern is:      Closed fracture of multiple ribs of left side with routine healing  Episode of recurrent major depressive disorder, unspecified depression episode severity (H)  Poor appetite  Chronic a-fib (H)  Cardiac pacemaker in situ  Coronary artery disease involving native heart without  "angina pectoris, unspecified vessel or lesion type  Primary hypertension  Orthostatic hypotension  Primary osteoarthritis involving multiple joints  Idiopathic peripheral neuropathy  Cognitive impairment   He is sitting up in his den,  watching tv and finishing breakfast (it's after 10 am). Reports the move here is \"getting a little better.\" Pleasant and more conversational than on our first visit. Reports his appetite has improved and he feels he's getting enough to eat. Denies rib pain or respiratory symptoms. Reports nocturia and frequent urination has also improved. Denies feeling dizzy or lightheaded. No falls.       Allergies, and PMH/PSH reviewed in EPIC today    REVIEW OF SYSTEMS:  4 point ROS including Respiratory, CV, GI and , other than that noted in the HPI,  is negative    Objective:   /62   Pulse 77   Temp 96.9  F (36.1  C)   Resp 16   Wt 67.6 kg (149 lb)   BMI 20.78 kg/m    GENERAL APPEARANCE:  Alert, in no distress, thin  ENT:  [place], oropharynx clear  EYES:  EOM normal, conjunctiva and lids normal  NECK: no adenopathy,masses or thyromegaly  RESP:  lungs clear to auscultation , no respiratory distress  CV:  regular rate and rhythm, no murmur, no edema. Palpable pacemaker  ABDOMEN:  soft, nontender, no distension, no  masses  M/S: up in chair.  WILKES with good strength. No joint inflammation.  SKIN:  no rashes or open areas  PSYCH:  oriented X 2-3, insight and judgement impaired, memory impaired, mood normal     Recent labs in [facility] reviewed by me today.     ASSESSMENT / PLAN:  (S22.42XD) Closed fracture of multiple ribs of left side with routine healing  (primary encounter diagnosis)  Comment: pain resolved. No falls since AL admission   Plan: continue tylenol. Continue vitamin D.     (F33.9) Episode of recurrent major depressive disorder, unspecified depression episode severity (H)  (R63.0) Poor appetite  Comment: appears in better mood and appetite has improved.   Plan: increase mirtazapine " to 15 mg HS. Continue sertraline.   Discussed with son Kan Cloud who agrees with plan of care.     (I48.20) Chronic a-fib (H)  (Z95.0) Cardiac pacemaker in situ  Comment: rate controlled.Pacemaker interrogation 1/24/2022 showed normal function  Plan: continue metoprolol, apixaban. Follow up with [facility] as scheduled     (I25.10) Coronary artery disease involving native heart without angina pectoris, unspecified vessel or lesion type  Comment: s/p CABG. No acute issues   Plan: continue metoprolol     (I10) Primary hypertension  (I95.1) Orthostatic hypotension  Comment: controlled with recent /62    Plan: continue metoprolol, midodrine. Continue sodium chloride tabs for now. Encourage fluids.     (M89.49) Primary osteoarthritis involving multiple joints  (G60.9) Idiopathic peripheral neuropathy  Comment: pain controlled   Plan: continue tylenol, gabapentin     (R35.1) Nocturia  Comment: probable BPH. Symptoms improved per his report. UC 2/25/2022 with no growth   Plan: consider tamsulosin and/or Urology referral if symptoms recur    (F10.10) Alcohol abuse  Comment: no etoh use since coming to [facility]  Plan: continue thiamine, B12, folic acid     (R41.89) Cognitive impairment  Comment: mild to moderate deficits on cognitive testing. Conversation is appropriate   BIMS 14/15, SBT 2/28, MOCA 16/30  Plan: AL staff assistance with cares, meals, activities and med admin.         Electronically signed by: MAGNOLIA Alanis CNP             Sincerely,        MAGNOLIA Alanis CNP

## 2022-04-27 VITALS
SYSTOLIC BLOOD PRESSURE: 122 MMHG | WEIGHT: 140 LBS | BODY MASS INDEX: 19.53 KG/M2 | DIASTOLIC BLOOD PRESSURE: 62 MMHG | TEMPERATURE: 96.8 F | RESPIRATION RATE: 16 BRPM | HEART RATE: 77 BPM

## 2022-04-27 NOTE — PROGRESS NOTES
[facility] GERIATRICS    Chief Complaint   Patient presents with     RECHECK     HPI:  Jc Cloud is a 89 year old  (3/25/1931), who is being seen today for an episodic care visit at: THE [facility]) [[id number]].   He moved to this facility 2/14/2022 for a higher level of care than could be managed at home. Medical history significant for CAD s/p CABG, afib, second degree AV block, biventricular cardiac pacemaker, HTN, orthostatic hypotension, etoh abuse, neuropathy, depression, anxiety, insomnia, osteoarthritis, sleep apnea, essential tremor, cognitive impairment.  He was  hospitalized at [facility] 1/24-1/27/2022 after a fall at home resulting in left rib fractures. CXR showed acute appearing fractures of the left lateral ribs 7-9, chronic rib deformity of right 6th rib and suspected small left pneumothorax. He was noted to have a reddish stool in the ED and guaiac was positive. Hgb remained stable and no s/s of GI bleed. Pacemaker interrogation showed normal function.   He discharged 1/27/2021 to [facility] tcu. Vitamin B12, folic acid and thiamine were started. Vitamin D was also started. Oxycodone was discontinued. At tcu discharge, he was ambulating 300 ft with walker and supervision. Required supervision to stand by assist with cares. BIMS 14/15, SBT 2/28, MOCA 16/30.    Today's concern is:      Alcohol abuse  Episode of recurrent major depressive disorder, unspecified depression episode severity (H)  Poor appetite  Chronic a-fib (H)  Cardiac pacemaker in situ  Coronary artery disease involving native heart without angina pectoris, unspecified vessel or lesion type  Primary hypertension  Orthostatic hypotension  Primary osteoarthritis involving multiple joints  Idiopathic peripheral neuropathy  Cognitive impairment   He report feeling well with improved appetite. Reports his mood is good and he's more comfortable at the facility. Sleeping well. Denies pain, including his  ribs. He reports having 1 alcohol drink daily. Independent with toileting and dressing. Ambulates without a device.   Staff reports he has been walking to the liquor store on his own. No falls or signs of intoxication.   Weight is up 10 lbs from admission.      Allergies, and PMH/PSH reviewed in EPIC today    REVIEW OF SYSTEMS:  4 point ROS including Respiratory, CV, GI and , other than that noted in the HPI,  is negative    Objective:   /62   Pulse 77   Temp 96.8  F (36  C)   Resp 16   Wt 63.5 kg (140 lb)   BMI 19.53 kg/m    GENERAL APPEARANCE:  Alert, in no distress   ENT:  [place], oropharynx clear  EYES:  EOM normal, conjunctiva and lids normal  NECK: no adenopathy,masses or thyromegaly  RESP:  lungs clear to auscultation , no respiratory distress  CV:  regular rate and rhythm, no murmur, trace bilateral pedal edema   ABDOMEN:  soft, nontender, no distension, no  masses  M/S: gait steady. WILKES with good strength. No joint inflammation.  SKIN:  no rashes or open areas  PSYCH:  oriented X 2-3, insight and judgement impaired, memory impaired, mood normal     Recent labs in [facility] reviewed by me today.     ASSESSMENT / PLAN:  (F10.10) Alcohol abuse  (primary encounter diagnosis)  Comment: long standing and unlikely that he will quit, despite medical recommendations.   Discussed with his son Kan and his wife, who have a good understanding of the situation. They are ok with staff administering 1 drink daily, if permitted by the facility. That may help prevent patient from going out on his own.   Plan: discussed with facility DHS, who will follow up with family.     (F33.9) Episode of recurrent major depressive disorder, unspecified depression episode severity (H)  (R63.0) Poor appetite  Comment: desirable weight gain of 10 lbs. Mood is good   Plan: continue mirtazapine and sertraline.     (I48.20) Chronic a-fib (H)  (Z95.0) Cardiac pacemaker in situ  Comment: recent HR: 77-97. AP 74 on exam today  Plan:  continue apixaban, metoprolol. Cardiology follow up at [facility] as scheduled     (I25.10) Coronary artery disease involving native heart without angina pectoris, unspecified vessel or lesion type  Comment: no acute symptoms. History of CBAG  Plan: continue metoprolol     (I10) Primary hypertension  (I95.1) Orthostatic hypotension  Comment: BPs variable: 171/69, 122/62  Plan: continue metoprolol, midodrine. Consider discontinuing sodium chloride.     (M89.49) Primary osteoarthritis involving multiple joints  (G60.9) Idiopathic peripheral neuropathy  Comment: pain controlled   Plan: continue tylenol, gabapentin     (R41.89) Cognitive impairment  Comment: mild to moderate deficits on cognitive testing.   BIMS 14/15, SBT 2/28, MOCA 16/30  Plan: AL staff assistance with cares, meals, activities and med admin.         Electronically signed by: MAGNOLIA Alanis CNP

## 2022-04-28 ENCOUNTER — ASSISTED LIVING VISIT (OUTPATIENT)
Dept: GERIATRICS | Facility: CLINIC | Age: 87
End: 2022-04-28
Payer: MEDICARE

## 2022-04-28 DIAGNOSIS — R63.0 POOR APPETITE: ICD-10-CM

## 2022-04-28 DIAGNOSIS — I25.10 CORONARY ARTERY DISEASE INVOLVING NATIVE HEART WITHOUT ANGINA PECTORIS, UNSPECIFIED VESSEL OR LESION TYPE: ICD-10-CM

## 2022-04-28 DIAGNOSIS — R41.89 COGNITIVE IMPAIRMENT: ICD-10-CM

## 2022-04-28 DIAGNOSIS — I95.1 ORTHOSTATIC HYPOTENSION: ICD-10-CM

## 2022-04-28 DIAGNOSIS — F33.9 EPISODE OF RECURRENT MAJOR DEPRESSIVE DISORDER, UNSPECIFIED DEPRESSION EPISODE SEVERITY (H): ICD-10-CM

## 2022-04-28 DIAGNOSIS — F10.10 ALCOHOL ABUSE: Primary | ICD-10-CM

## 2022-04-28 DIAGNOSIS — I48.20 CHRONIC A-FIB (H): ICD-10-CM

## 2022-04-28 DIAGNOSIS — Z95.0 CARDIAC PACEMAKER IN SITU: ICD-10-CM

## 2022-04-28 DIAGNOSIS — M15.0 PRIMARY OSTEOARTHRITIS INVOLVING MULTIPLE JOINTS: ICD-10-CM

## 2022-04-28 DIAGNOSIS — G60.9 IDIOPATHIC PERIPHERAL NEUROPATHY: ICD-10-CM

## 2022-04-28 DIAGNOSIS — I10 PRIMARY HYPERTENSION: ICD-10-CM

## 2022-04-28 NOTE — LETTER
4/27/2022        RE: Jc Cloud  [facility]  [address]  [facility] [address]        [facility] GERIATRICS    Chief Complaint   Patient presents with     RECHECK     HPI:  Jc Cloud is a 89 year old  (3/25/1931), who is being seen today for an episodic care visit at: THE [facility] ([facility]) [[id number]].   He moved to this facility 2/14/2022 for a higher level of care than could be managed at home. Medical history significant for CAD s/p CABG, afib, second degree AV block, biventricular cardiac pacemaker, HTN, orthostatic hypotension, etoh abuse, neuropathy, depression, anxiety, insomnia, osteoarthritis, sleep apnea, essential tremor, cognitive impairment.  He was  hospitalized at [facility] 1/24-1/27/2022 after a fall at home resulting in left rib fractures. CXR showed acute appearing fractures of the left lateral ribs 7-9, chronic rib deformity of right 6th rib and suspected small left pneumothorax. He was noted to have a reddish stool in the ED and guaiac was positive. Hgb remained stable and no s/s of GI bleed. Pacemaker interrogation showed normal function.   He discharged 1/27/2021 to [facility] tcu. Vitamin B12, folic acid and thiamine were started. Vitamin D was also started. Oxycodone was discontinued. At tcu discharge, he was ambulating 300 ft with walker and supervision. Required supervision to stand by assist with cares. BIMS 14/15, SBT 2/28, MOCA 16/30.    Today's concern is:      Alcohol abuse  Episode of recurrent major depressive disorder, unspecified depression episode severity (H)  Poor appetite  Chronic a-fib (H)  Cardiac pacemaker in situ  Coronary artery disease involving native heart without angina pectoris, unspecified vessel or lesion type  Primary hypertension  Orthostatic hypotension  Primary osteoarthritis involving multiple joints  Idiopathic peripheral neuropathy  Cognitive impairment   He report feeling well with improved  appetite. Reports his mood is good and he's more comfortable at the facility. Sleeping well. Denies pain, including his ribs. He reports having 1 alcohol drink daily. Independent with toileting and dressing. Ambulates without a device.   Staff reports he has been walking to the liquor store on his own. No falls or signs of intoxication.   Weight is up 10 lbs from admission.      Allergies, and PMH/PSH reviewed in EPIC today    REVIEW OF SYSTEMS:  4 point ROS including Respiratory, CV, GI and , other than that noted in the HPI,  is negative    Objective:   /62   Pulse 77   Temp 96.8  F (36  C)   Resp 16   Wt 63.5 kg (140 lb)   BMI 19.53 kg/m    GENERAL APPEARANCE:  Alert, in no distress   ENT:  [place], oropharynx clear  EYES:  EOM normal, conjunctiva and lids normal  NECK: no adenopathy,masses or thyromegaly  RESP:  lungs clear to auscultation , no respiratory distress  CV:  regular rate and rhythm, no murmur, trace bilateral pedal edema   ABDOMEN:  soft, nontender, no distension, no  masses  M/S: gait steady. WILKES with good strength. No joint inflammation.  SKIN:  no rashes or open areas  PSYCH:  oriented X 2-3, insight and judgement impaired, memory impaired, mood normal     Recent labs in [facility] reviewed by me today.     ASSESSMENT / PLAN:  (F10.10) Alcohol abuse  (primary encounter diagnosis)  Comment: long standing and unlikely that he will quit, despite medical recommendations.   Discussed with his son Kan and his wife, who have a good understanding of the situation. They are ok with staff administering 1 drink daily, if permitted by the facility. That may help prevent patient from going out on his own.   Plan: discussed with facility DHS, who will follow up with family.     (F33.9) Episode of recurrent major depressive disorder, unspecified depression episode severity (H)  (R63.0) Poor appetite  Comment: desirable weight gain of 10 lbs. Mood is good   Plan: continue mirtazapine and sertraline.      (I48.20) Chronic a-fib (H)  (Z95.0) Cardiac pacemaker in situ  Comment: recent HR: 77-97. AP 74 on exam today  Plan: continue apixaban, metoprolol. Cardiology follow up at [facility] as scheduled     (I25.10) Coronary artery disease involving native heart without angina pectoris, unspecified vessel or lesion type  Comment: no acute symptoms. History of CBAG  Plan: continue metoprolol     (I10) Primary hypertension  (I95.1) Orthostatic hypotension  Comment: BPs variable: 171/69, 122/62  Plan: continue metoprolol, midodrine. Consider discontinuing sodium chloride.     (M89.49) Primary osteoarthritis involving multiple joints  (G60.9) Idiopathic peripheral neuropathy  Comment: pain controlled   Plan: continue tylenol, gabapentin     (R41.89) Cognitive impairment  Comment: mild to moderate deficits on cognitive testing.   BIMS 14/15, SBT 2/28, MOCA 16/30  Plan: AL staff assistance with cares, meals, activities and med admin.         Electronically signed by: MAGNOLIA Alanis CNP             Sincerely,        MAGNOLIA Alanis CNP

## 2022-05-25 ENCOUNTER — LAB REQUISITION (OUTPATIENT)
Dept: LAB | Facility: CLINIC | Age: 87
End: 2022-05-25
Payer: MEDICARE

## 2022-05-25 DIAGNOSIS — U07.1 COVID-19: ICD-10-CM

## 2022-05-26 PROCEDURE — U0005 INFEC AGEN DETEC AMPLI PROBE: HCPCS | Mod: ORL | Performed by: INTERNAL MEDICINE

## 2022-05-27 LAB — SARS-COV-2 RNA RESP QL NAA+PROBE: NEGATIVE

## 2022-06-01 ENCOUNTER — LAB REQUISITION (OUTPATIENT)
Dept: LAB | Facility: CLINIC | Age: 87
End: 2022-06-01
Payer: MEDICARE

## 2022-06-01 DIAGNOSIS — U07.1 COVID-19: ICD-10-CM

## 2022-06-09 ENCOUNTER — ASSISTED LIVING VISIT (OUTPATIENT)
Dept: GERIATRICS | Facility: CLINIC | Age: 87
End: 2022-06-09
Payer: MEDICARE

## 2022-06-09 DIAGNOSIS — I95.1 ORTHOSTATIC HYPOTENSION: ICD-10-CM

## 2022-06-09 DIAGNOSIS — I10 PRIMARY HYPERTENSION: ICD-10-CM

## 2022-06-09 DIAGNOSIS — R63.0 POOR APPETITE: ICD-10-CM

## 2022-06-09 DIAGNOSIS — G60.9 IDIOPATHIC PERIPHERAL NEUROPATHY: ICD-10-CM

## 2022-06-09 DIAGNOSIS — M15.0 PRIMARY OSTEOARTHRITIS INVOLVING MULTIPLE JOINTS: ICD-10-CM

## 2022-06-09 DIAGNOSIS — F33.9 EPISODE OF RECURRENT MAJOR DEPRESSIVE DISORDER, UNSPECIFIED DEPRESSION EPISODE SEVERITY (H): ICD-10-CM

## 2022-06-09 DIAGNOSIS — R41.89 COGNITIVE IMPAIRMENT: ICD-10-CM

## 2022-06-09 DIAGNOSIS — F10.10 ALCOHOL ABUSE: ICD-10-CM

## 2022-06-09 DIAGNOSIS — I25.10 CORONARY ARTERY DISEASE INVOLVING NATIVE HEART WITHOUT ANGINA PECTORIS, UNSPECIFIED VESSEL OR LESION TYPE: ICD-10-CM

## 2022-06-09 DIAGNOSIS — I48.20 CHRONIC A-FIB (H): Primary | ICD-10-CM

## 2022-06-09 DIAGNOSIS — Z95.0 CARDIAC PACEMAKER IN SITU: ICD-10-CM

## 2022-06-09 NOTE — LETTER
6/9/2022        RE: Jc Cloud  [facility]  [address]  [facility] [address]        [facility] GERIATRICS  ACUTE/EPISODIC VISIT    [facility] Medical Record Number:  [id number]  Place of Service where encounter took place:  THE [facility] ([facility]) [[id number]]    Chief Complaint   Patient presents with     RECHECK       HPI:    Jc Cloud is a 89 year old  (3/25/1931), who is being seen today for an episodic care visit.  HPI information obtained from: facility chart records, facility staff, patient report and [facility] chart review.  He moved to this facility 2/14/2022 for a higher level of care than could be managed at home. Medical history significant for CAD s/p CABG, afib, second degree AV block, biventricular cardiac pacemaker, HTN, orthostatic hypotension, etoh abuse, neuropathy, depression, anxiety, insomnia, osteoarthritis, sleep apnea, essential tremor, cognitive impairment.  He was  hospitalized at [facility] 1/24-1/27/2022 after a fall at home resulting in left rib fractures. CXR showed acute appearing fractures of the left lateral ribs 7-9, chronic rib deformity of right 6th rib and suspected small left pneumothorax. He was noted to have a reddish stool in the ED and guaiac was positive. Hgb remained stable and no s/s of GI bleed. Pacemaker interrogation showed normal function.   He discharged 1/27/2021 to [facility] tcu. Vitamin B12, folic acid and thiamine were started. Vitamin D was also started. Oxycodone was discontinued. At tcu discharge, he was ambulating 300 ft with walker and supervision. Required supervision to stand by assist with cares. BIMS 14/15, SBT 2/28, MOCA 16/30.      Today's concern is:     Chronic a-fib (H)  Cardiac pacemaker in situ  Coronary artery disease involving native heart without angina pectoris, unspecified vessel or lesion type  Primary hypertension  Orthostatic hypotension  Alcohol abuse  Episode of  recurrent major depressive disorder, unspecified depression episode severity (H)  Poor appetite  Primary osteoarthritis involving multiple joints  Idiopathic peripheral neuropathy  Cognitive impairment  He reports feeling well with good appetite. Denies pain of any type. Denies feeling dizzy or lightheaded.  He was unhappy about moving here, but states that he has adjusted and things are going well. Ambulates without a device. Independent with cares. Receives 1 alcoholic drink from staff.       ALLERGIES:    Allergies   Allergen Reactions     Atenolol      Lisinopril      Meperidine      Metoprolol      Quetiapine      Triamterene         MEDICATIONS:  Post Discharge Medication Reconciliation Status: discharge medications reconciled, continue medications without change    Current Outpatient Medications   Medication Sig Dispense Refill     ACETAMINOPHEN EXTRA STRENGTH 500 MG tablet TAKE TWO TABLETS (1000MG) BY MOUTH TWICE DAILY 112 tablet 97     apixaban ANTICOAGULANT (ELIQUIS ANTICOAGULANT) 2.5 MG tablet TAKE 1 TABLET BY MOUTH TWICE A DAY 60 tablet 11     cholecalciferol (VITAMIN D3) 10 mcg (400 units) TABS tablet Take 1 tablet (10 mcg) by mouth daily 31 tablet 98     cyanocobalamin (VITAMIN B-12) 1000 MCG tablet Take 1 tablet (1,000 mcg) by mouth daily 31 tablet 98     folic acid (FOLVITE) 1 MG tablet Take 1 tablet (1 mg) by mouth daily 31 tablet 98     gabapentin (NEURONTIN) 300 MG capsule Take 1 capsule (300 mg) by mouth 2 times daily 62 capsule 98     magnesium oxide (MAG-OX) 400 (240 Mg) MG tablet Take 1 tablet (400 mg) by mouth daily 31 tablet 98     metoprolol succinate ER (TOPROL-XL) 25 MG 24 hr tablet Take 1 tablet (25 mg) by mouth every morning AND 50 MG EVERY EVENING 93 tablet 98     midodrine (PROAMATINE) 5 MG tablet Take 1 tablet (5 mg) by mouth 2 times daily 62 tablet 98     mirtazapine (REMERON) 15 MG tablet Take 1 tablet (15 mg) by mouth At Bedtime 30 tablet 11     polyethylene glycol (MIRALAX) 17  GM/Dose powder Take 17 g by mouth daily 510 g 11     primidone (MYSOLINE) 50 MG tablet TAKE 1/2 TABLET BY MOUTH TWICE DAILY 30 tablet 11     sertraline (ZOLOFT) 25 MG tablet Take 1 tablet (25 mg) by mouth daily 31 tablet 98     sodium chloride 1 GM tablet Take 1 tablet (1 g) by mouth 2 times daily 62 tablet 98     thiamine (B-1) 100 MG tablet Take 1 tablet (100 mg) by mouth daily 30 tablet 11     traZODone (DESYREL) 50 MG tablet Take 50 mg by mouth nightly as needed            REVIEW OF SYSTEMS:  4 point ROS neg other than the symptoms noted in HPI     PHYSICAL EXAM:  Resp 16   GENERAL APPEARANCE:  Alert, in no distress   ENT:  [place], oropharynx clear  EYES:  conjunctiva and lids normal  NECK: no adenopathy or  thyromegaly  RESP:  lungs clear to auscultation , no respiratory distress  CV:  regular rate and rhythm, no murmur, no LE edema   ABDOMEN:  soft, nontender, no distension, no  masses  M/S: up in chair.  WILKES with good strength. No joint inflammation.  SKIN:  no rashes or open areas  PSYCH:  oriented X 3, insight and judgement impaired, memory impaired, mood normal     ASSESSMENT / PLAN:  (I48.20) Chronic a-fib (H)  (primary encounter diagnosis)  (Z95.0) Cardiac pacemaker in situ  Comment: rate controlled in the 70s on exam.   Plan: continue metoprolol and apixaban. Cardiology follow up as scheduled.   Message left for his son     (I25.10) Coronary artery disease involving native heart without angina pectoris, unspecified vessel or lesion type  Comment: no acute issues. History of CABG  Plan: continue metoprolol     (I10) Primary hypertension  (I95.1) Orthostatic hypotension  Comment: no recent VS to review. Appears asymptomatic   Plan: continue metoprolol, midodrine. Continue sodium chloride tabs and check labs at next visit.     (F10.10) Alcohol abuse  Comment: long standing. No recent reports of him going out to get alcohol.   Plan: 1 drink daily with dinner administered by staff     (F33.9) Episode of  recurrent major depressive disorder, unspecified depression episode severity (H)  Comment: mood much improved. Pleasant and conversant   Plan: continue sertraline, mirtazapine.     (R63.0) Poor appetite  Comment: oral intake has improved and he's had a desirable weight gain of 10 lbs   Plan: continue mirtazapine. Follow weight     (M89.49) Primary osteoarthritis involving multiple joints  (G60.9) Idiopathic peripheral neuropathy  Comment: pain controlled  Plan: continue gabapentin, tylenol     (R41.89) Cognitive impairment  Comment: mild to moderate deficits, consistent with late onset dementia. Conversation is appropriate   Plan: AL staff assistance with cares, meals, activities and med admin         Electronically signed by  MAGNOLIA Alanis CNP               Sincerely,        MAGNOLIA Alanis CNP

## 2022-06-09 NOTE — PROGRESS NOTES
[facility] GERIATRICS  ACUTE/EPISODIC VISIT    [facility] Medical Record Number:  [id number]  Place of Service where encounter took place:  [facility] ([facility]) [[id number]]    Chief Complaint   Patient presents with     RECHECK       HPI:    Jc Cloud is a 89 year old  (3/25/1931), who is being seen today for an episodic care visit.  HPI information obtained from: facility chart records, facility staff, patient report and [facility] chart review.  He moved to this facility 2/14/2022 for a higher level of care than could be managed at home. Medical history significant for CAD s/p CABG, afib, second degree AV block, biventricular cardiac pacemaker, HTN, orthostatic hypotension, etoh abuse, neuropathy, depression, anxiety, insomnia, osteoarthritis, sleep apnea, essential tremor, cognitive impairment.  He was  hospitalized at [facility] 1/24-1/27/2022 after a fall at home resulting in left rib fractures. CXR showed acute appearing fractures of the left lateral ribs 7-9, chronic rib deformity of right 6th rib and suspected small left pneumothorax. He was noted to have a reddish stool in the ED and guaiac was positive. Hgb remained stable and no s/s of GI bleed. Pacemaker interrogation showed normal function.   He discharged 1/27/2021 to [facility] tcu. Vitamin B12, folic acid and thiamine were started. Vitamin D was also started. Oxycodone was discontinued. At tcu discharge, he was ambulating 300 ft with walker and supervision. Required supervision to stand by assist with cares. BIMS 14/15, SBT 2/28, MOCA 16/30.      Today's concern is:     Chronic a-fib (H)  Cardiac pacemaker in situ  Coronary artery disease involving native heart without angina pectoris, unspecified vessel or lesion type  Primary hypertension  Orthostatic hypotension  Alcohol abuse  Episode of recurrent major depressive disorder, unspecified depression episode severity (H)  Poor appetite  Primary osteoarthritis  involving multiple joints  Idiopathic peripheral neuropathy  Cognitive impairment  He reports feeling well with good appetite. Denies pain of any type. Denies feeling dizzy or lightheaded.  He was unhappy about moving here, but states that he has adjusted and things are going well. Ambulates without a device. Independent with cares. Receives 1 alcoholic drink from staff.       ALLERGIES:    Allergies   Allergen Reactions     Atenolol      Lisinopril      Meperidine      Metoprolol      Quetiapine      Triamterene         MEDICATIONS:  Post Discharge Medication Reconciliation Status: discharge medications reconciled, continue medications without change    Current Outpatient Medications   Medication Sig Dispense Refill     ACETAMINOPHEN EXTRA STRENGTH 500 MG tablet TAKE TWO TABLETS (1000MG) BY MOUTH TWICE DAILY 112 tablet 97     apixaban ANTICOAGULANT (ELIQUIS ANTICOAGULANT) 2.5 MG tablet TAKE 1 TABLET BY MOUTH TWICE A DAY 60 tablet 11     cholecalciferol (VITAMIN D3) 10 mcg (400 units) TABS tablet Take 1 tablet (10 mcg) by mouth daily 31 tablet 98     cyanocobalamin (VITAMIN B-12) 1000 MCG tablet Take 1 tablet (1,000 mcg) by mouth daily 31 tablet 98     folic acid (FOLVITE) 1 MG tablet Take 1 tablet (1 mg) by mouth daily 31 tablet 98     gabapentin (NEURONTIN) 300 MG capsule Take 1 capsule (300 mg) by mouth 2 times daily 62 capsule 98     magnesium oxide (MAG-OX) 400 (240 Mg) MG tablet Take 1 tablet (400 mg) by mouth daily 31 tablet 98     metoprolol succinate ER (TOPROL-XL) 25 MG 24 hr tablet Take 1 tablet (25 mg) by mouth every morning AND 50 MG EVERY EVENING 93 tablet 98     midodrine (PROAMATINE) 5 MG tablet Take 1 tablet (5 mg) by mouth 2 times daily 62 tablet 98     mirtazapine (REMERON) 15 MG tablet Take 1 tablet (15 mg) by mouth At Bedtime 30 tablet 11     polyethylene glycol (MIRALAX) 17 GM/Dose powder Take 17 g by mouth daily 510 g 11     primidone (MYSOLINE) 50 MG tablet TAKE 1/2 TABLET BY MOUTH TWICE  DAILY 30 tablet 11     sertraline (ZOLOFT) 25 MG tablet Take 1 tablet (25 mg) by mouth daily 31 tablet 98     sodium chloride 1 GM tablet Take 1 tablet (1 g) by mouth 2 times daily 62 tablet 98     thiamine (B-1) 100 MG tablet Take 1 tablet (100 mg) by mouth daily 30 tablet 11     traZODone (DESYREL) 50 MG tablet Take 50 mg by mouth nightly as needed            REVIEW OF SYSTEMS:  4 point ROS neg other than the symptoms noted in HPI     PHYSICAL EXAM:  Resp 16   GENERAL APPEARANCE:  Alert, in no distress   ENT:  [place], oropharynx clear  EYES:  conjunctiva and lids normal  NECK: no adenopathy or  thyromegaly  RESP:  lungs clear to auscultation , no respiratory distress  CV:  regular rate and rhythm, no murmur, no LE edema   ABDOMEN:  soft, nontender, no distension, no  masses  M/S: up in chair.  WILKES with good strength. No joint inflammation.  SKIN:  no rashes or open areas  PSYCH:  oriented X 3, insight and judgement impaired, memory impaired, mood normal     ASSESSMENT / PLAN:  (I48.20) Chronic a-fib (H)  (primary encounter diagnosis)  (Z95.0) Cardiac pacemaker in situ  Comment: rate controlled in the 70s on exam.   Plan: continue metoprolol and apixaban. Cardiology follow up as scheduled.   Message left for his son     (I25.10) Coronary artery disease involving native heart without angina pectoris, unspecified vessel or lesion type  Comment: no acute issues. History of CABG  Plan: continue metoprolol     (I10) Primary hypertension  (I95.1) Orthostatic hypotension  Comment: no recent VS to review. Appears asymptomatic   Plan: continue metoprolol, midodrine. Continue sodium chloride tabs and check labs at next visit.     (F10.10) Alcohol abuse  Comment: long standing. No recent reports of him going out to get alcohol.   Plan: 1 drink daily with dinner administered by staff     (F33.9) Episode of recurrent major depressive disorder, unspecified depression episode severity (H)  Comment: mood much improved. Pleasant and  conversant   Plan: continue sertraline, mirtazapine.     (R63.0) Poor appetite  Comment: oral intake has improved and he's had a desirable weight gain of 10 lbs   Plan: continue mirtazapine. Follow weight     (M89.49) Primary osteoarthritis involving multiple joints  (G60.9) Idiopathic peripheral neuropathy  Comment: pain controlled  Plan: continue gabapentin, tylenol     (R41.89) Cognitive impairment  Comment: mild to moderate deficits, consistent with late onset dementia. Conversation is appropriate   Plan: [place] staff assistance with cares, meals, activities and med admin         Electronically signed by  MAGNOLIA Alanis CNP

## 2022-06-10 DIAGNOSIS — R52 PAIN: ICD-10-CM

## 2022-06-11 RX ORDER — PSEUDOEPHED/ACETAMINOPH/DIPHEN 30MG-500MG
TABLET ORAL
Qty: 112 TABLET | Refills: 97 | Status: SHIPPED | OUTPATIENT
Start: 2022-06-11 | End: 2022-09-22

## 2022-06-15 ENCOUNTER — LAB REQUISITION (OUTPATIENT)
Dept: LAB | Facility: CLINIC | Age: 87
End: 2022-06-15
Payer: MEDICARE

## 2022-06-15 DIAGNOSIS — U07.1 COVID-19: ICD-10-CM

## 2022-06-16 PROCEDURE — U0003 INFECTIOUS AGENT DETECTION BY NUCLEIC ACID (DNA OR RNA); SEVERE ACUTE RESPIRATORY SYNDROME CORONAVIRUS 2 (SARS-COV-2) (CORONAVIRUS DISEASE [COVID-19]), AMPLIFIED PROBE TECHNIQUE, MAKING USE OF HIGH THROUGHPUT TECHNOLOGIES AS DESCRIBED BY CMS-2020-01-R: HCPCS | Mod: ORL | Performed by: INTERNAL MEDICINE

## 2022-06-18 LAB — SARS-COV-2 RNA RESP QL NAA+PROBE: NEGATIVE

## 2022-06-20 VITALS — RESPIRATION RATE: 16 BRPM

## 2022-06-22 ENCOUNTER — LAB REQUISITION (OUTPATIENT)
Dept: LAB | Facility: CLINIC | Age: 87
End: 2022-06-22
Payer: MEDICARE

## 2022-06-22 DIAGNOSIS — U07.1 COVID-19: ICD-10-CM

## 2022-06-23 PROCEDURE — U0005 INFEC AGEN DETEC AMPLI PROBE: HCPCS | Mod: ORL | Performed by: INTERNAL MEDICINE

## 2022-06-24 LAB — SARS-COV-2 RNA RESP QL NAA+PROBE: NEGATIVE

## 2022-08-11 VITALS
BODY MASS INDEX: 22.26 KG/M2 | RESPIRATION RATE: 32 BRPM | DIASTOLIC BLOOD PRESSURE: 72 MMHG | WEIGHT: 159.6 LBS | SYSTOLIC BLOOD PRESSURE: 138 MMHG | HEART RATE: 68 BPM | TEMPERATURE: 98.6 F

## 2022-08-12 ENCOUNTER — ASSISTED LIVING VISIT (OUTPATIENT)
Dept: GERIATRICS | Facility: CLINIC | Age: 87
End: 2022-08-12
Payer: MEDICARE

## 2022-08-12 DIAGNOSIS — F10.10 ALCOHOL ABUSE: ICD-10-CM

## 2022-08-12 DIAGNOSIS — I48.20 CHRONIC A-FIB (H): Primary | ICD-10-CM

## 2022-08-12 DIAGNOSIS — F33.9 EPISODE OF RECURRENT MAJOR DEPRESSIVE DISORDER, UNSPECIFIED DEPRESSION EPISODE SEVERITY (H): ICD-10-CM

## 2022-08-12 DIAGNOSIS — G60.9 IDIOPATHIC PERIPHERAL NEUROPATHY: ICD-10-CM

## 2022-08-12 DIAGNOSIS — M15.0 PRIMARY OSTEOARTHRITIS INVOLVING MULTIPLE JOINTS: ICD-10-CM

## 2022-08-12 DIAGNOSIS — I95.1 ORTHOSTATIC HYPOTENSION: ICD-10-CM

## 2022-08-12 DIAGNOSIS — I10 PRIMARY HYPERTENSION: ICD-10-CM

## 2022-08-12 DIAGNOSIS — E87.1 HYPONATREMIA: ICD-10-CM

## 2022-08-12 DIAGNOSIS — R41.89 COGNITIVE IMPAIRMENT: ICD-10-CM

## 2022-08-12 DIAGNOSIS — I25.10 CORONARY ARTERY DISEASE INVOLVING NATIVE HEART WITHOUT ANGINA PECTORIS, UNSPECIFIED VESSEL OR LESION TYPE: ICD-10-CM

## 2022-08-12 DIAGNOSIS — Z95.0 CARDIAC PACEMAKER IN SITU: ICD-10-CM

## 2022-08-12 RX ORDER — SODIUM CHLORIDE 1 G/1
1 TABLET ORAL DAILY
Qty: 30 TABLET | Refills: 11 | Status: SHIPPED | OUTPATIENT
Start: 2022-08-12 | End: 2022-09-22

## 2022-08-12 NOTE — PROGRESS NOTES
[facility] GERIATRICS    Chief Complaint   Patient presents with     RECHECK     HPI:  Jc Cloud is a 89 year old  (3/25/1931), who is being seen today for an episodic care visit at: THE [facility]) [[id number]].  He moved to this facility 2/14/2022 for a higher level of care than could be managed at home. Medical history significant for CAD s/p CABG, afib, second degree AV block, biventricular cardiac pacemaker, HTN, orthostatic hypotension, etoh abuse, neuropathy, depression, anxiety, insomnia, osteoarthritis, sleep apnea, essential tremor, cognitive impairment.  He was  hospitalized at [facility] 1/24-1/27/2022 after a fall at home resulting in left rib fractures. CXR showed acute appearing fractures of the left lateral ribs 7-9, chronic rib deformity of right 6th rib and suspected small left pneumothorax. He was noted to have a reddish stool in the ED and guaiac was positive. Hgb remained stable and no s/s of GI bleed. Pacemaker interrogation showed normal function.   He discharged 1/27/2021 to [facility] tcu. Vitamin B12, folic acid and thiamine were started. Vitamin D was also started. Oxycodone was discontinued. At tcu discharge, he was ambulating 300 ft with walker and supervision. Required supervision to stand by assist with cares. BIMS 14/15, SBT 2/28, MOCA 16/30.     Today's concern is:      Chronic a-fib (H)  Cardiac pacemaker in situ  Coronary artery disease involving native heart without angina pectoris, unspecified vessel or lesion type  Episode of recurrent major depressive disorder, unspecified depression episode severity (H)  Alcohol abuse  Primary hypertension  Orthostatic hypotension  Hyponatremia  Primary osteoarthritis involving multiple joints  Idiopathic peripheral neuropathy  Cognitive impairment  He reports feeling well with good appetite. He's sitting up at the table working on the daily crossword puzzle, and has it almost completed. Has had a desirable  weight gain of 19 lbs since admission.  Denies pain. Reports his mood is good and he's sleeping well. Denies feeling dizzy or lightheaded. Ambulates without a device. No falls. Independent with cares.   Spoke with his son and daughter in law, Kan and Iesha Cloud, who report that he has asked to return to his home and live independently. Receives 1 alcoholic drink daily from staff and family thinks he is going out to buy alcohol. No episodes of intoxication reported by staff. Family agrees his mood and overall well being have improved.     Allergies, and PMH/PSH reviewed in EPIC today    REVIEW OF SYSTEMS:  4 point ROS including Respiratory, CV, GI and , other than that noted in the HPI,  is negative    Objective:   /72   Pulse 68   Temp 98.6  F (37  C)   Resp (!) 32   Wt 72.4 kg (159 lb 9.6 oz)   BMI 22.26 kg/m    GENERAL APPEARANCE:  Alert, in no distress   ENT:  [place], oropharynx clear  EYES:  conjunctiva and lids normal  NECK: no adenopathy or  thyromegaly  RESP:  lungs clear to auscultation  CV:  regular rate and rhythm, no murmur, no LE edema   ABDOMEN:  soft, nontender, no distension, no  masses  M/S: up in chair.  WILKES with good strength. No joint inflammation.  SKIN:  no rashes or open areas  PSYCH:  oriented X 3, insight and judgement impaired, memory impaired, mood normal     Recent labs in [facility] reviewed by me today.     ASSESSMENT / PLAN:  (I48.20) Chronic a-fib (H)  (primary encounter diagnosis)  (Z95.0) Cardiac pacemaker in situ  Comment: rate controlled: 66-68  Plan: continue metoprolol and apixaban. Son manages appts for pacemaker checks and Cardiology     (I25.10) Coronary artery disease involving native heart without angina pectoris, unspecified vessel or lesion type  Comment: no acute symptoms  History of CABG  Plan: continue metoprolol.     (F33.9) Episode of recurrent major depressive disorder, unspecified depression episode severity (H)  Comment: mood, sleep and appetite much  improved after mirtazapine was started 2/2022.   Plan: continue mirtazapine and prn trazodone. Discontinue low dose sertraline to minimize meds.     (F10.10) Alcohol abuse  Comment: long standing. Frequency of drinking has improved since [place].   Plan: 1 drink daily administered by staff. Continue thiamine, folic acid and [id number] stopping at next visit.     (I10) Primary hypertension   (I95.1) Orthostatic hypotension  Comment: orthostatic BP has improved. Recent BPs:138/72, 139/96    Plan: decrease sodium chloride tabs to daily. Continue metoprolol, midodrine. Monitor VS and adjust meds as indicated.     (E87.1) Hyponatremia  Comment: nutrition and fluid intake have improved, as well as orthostatic BPs.   Plan: decrease sodium tabs to daily. BMP next week.     (M89.49) Primary osteoarthritis involving multiple joints  (G60.9) Idiopathic peripheral neuropathy  Comment: no significant pain issues. No falls   Plan: continue gabapentin, tylenol     (R41.89) Cognitive impairment  Comment: cognition has improved since admission. He has done well in a supervised environment with limited alcohol use. He would like to return home, but family plans to encourage him to remain at the facility  Plan: AL staff assistance with cares, meals, activities and med admin         Electronically signed by: MAGNOLIA Alanis CNP

## 2022-08-12 NOTE — LETTER
8/12/2022        RE: Jc Cloud  [facility]  [address]  [facility] [facility] [address]        [facility] GERIATRICS    Chief Complaint   Patient presents with     RECHECK     HPI:  Jc Cloud is a 89 year old  (3/25/1931), who is being seen today for an episodic care visit at: THE [facility] [[id number]].  He moved to this facility 2/14/2022 for a higher level of care than could be managed at home. Medical history significant for CAD s/p CABG, afib, second degree AV block, biventricular cardiac pacemaker, HTN, orthostatic hypotension, etoh abuse, neuropathy, depression, anxiety, insomnia, osteoarthritis, sleep apnea, essential tremor, cognitive impairment.  He was  hospitalized at [facility] 1/24-1/27/2022 after a fall at home resulting in left rib fractures. CXR showed acute appearing fractures of the left lateral ribs 7-9, chronic rib deformity of right 6th rib and suspected small left pneumothorax. He was noted to have a reddish stool in the ED and guaiac was positive. Hgb remained stable and no s/s of GI bleed. Pacemaker interrogation showed normal function.   He discharged 1/27/2021 to [facility] tcu. Vitamin B12, folic acid and thiamine were started. Vitamin D was also started. Oxycodone was discontinued. At tcu discharge, he was ambulating 300 ft with walker and supervision. Required supervision to stand by assist with cares. BIMS 14/15, SBT 2/28, MOCA 16/30.     Today's concern is:      Chronic a-fib (H)  Cardiac pacemaker in situ  Coronary artery disease involving native heart without angina pectoris, unspecified vessel or lesion type  Episode of recurrent major depressive disorder, unspecified depression episode severity (H)  Alcohol abuse  Primary hypertension  Orthostatic hypotension  Hyponatremia  Primary osteoarthritis involving multiple joints  Idiopathic peripheral neuropathy  Cognitive impairment  He reports feeling well with good appetite.  He's sitting up at the table working on the daily crossword puzzle, and has it almost completed. Has had a desirable weight gain of 19 lbs since admission.  Denies pain. Reports his mood is good and he's sleeping well. Denies feeling dizzy or lightheaded. Ambulates without a device. No falls. Independent with cares.   Spoke with his son and daughter in law, Kan and Iesha Cloud, who report that he has asked to return to his home and live independently. Receives 1 alcoholic drink daily from staff and family thinks he is going out to buy alcohol. No episodes of intoxication reported by staff. Family agrees his mood and overall well being have improved.     Allergies, and PMH/PSH reviewed in EPIC today    REVIEW OF SYSTEMS:  4 point ROS including Respiratory, CV, GI and , other than that noted in the HPI,  is negative    Objective:   /72   Pulse 68   Temp 98.6  F (37  C)   Resp (!) 32   Wt 72.4 kg (159 lb 9.6 oz)   BMI 22.26 kg/m    GENERAL APPEARANCE:  Alert, in no distress   ENT:  [place], oropharynx clear  EYES:  conjunctiva and lids normal  NECK: no adenopathy or  thyromegaly  RESP:  lungs clear to auscultation  CV:  regular rate and rhythm, no murmur, no LE edema   ABDOMEN:  soft, nontender, no distension, no  masses  M/S: up in chair.  WILKES with good strength. No joint inflammation.  SKIN:  no rashes or open areas  PSYCH:  oriented X 3, insight and judgement impaired, memory impaired, mood normal     Recent labs in [facility] reviewed by me today.     ASSESSMENT / PLAN:  (I48.20) Chronic a-fib (H)  (primary encounter diagnosis)  (Z95.0) Cardiac pacemaker in situ  Comment: rate controlled: 66-68  Plan: continue metoprolol and apixaban. Son manages appts for pacemaker checks and Cardiology     (I25.10) Coronary artery disease involving native heart without angina pectoris, unspecified vessel or lesion type  Comment: no acute symptoms  History of CABG  Plan: continue metoprolol.     (F33.9) Episode of  recurrent major depressive disorder, unspecified depression episode severity (H)  Comment: mood, sleep and appetite much improved after mirtazapine was started 2/2022.   Plan: continue mirtazapine and prn trazodone. Discontinue low dose sertraline to minimize meds.     (F10.10) Alcohol abuse  Comment: long standing. Frequency of drinking has improved since moving [place].   Plan: 1 drink daily administered by staff. Continue thiamine, folic acid and [id number] stopping at next visit.     (I10) Primary hypertension   (I95.1) Orthostatic hypotension  Comment: orthostatic BP has improved. Recent BPs:138/72, 139/96    Plan: decrease sodium chloride tabs to daily. Continue metoprolol, midodrine. Monitor VS and adjust meds as indicated.     (E87.1) Hyponatremia  Comment: nutrition and fluid intake have improved, as well as orthostatic BPs.   Plan: decrease sodium tabs to daily. BMP next week.     (M89.49) Primary osteoarthritis involving multiple joints  (G60.9) Idiopathic peripheral neuropathy  Comment: no significant pain issues. No falls   Plan: continue gabapentin, tylenol     (R41.89) Cognitive impairment  Comment: cognition has improved since admission. He has done well in a supervised environment with limited alcohol use. He would like to return home, but family plans to encourage him to remain at the facility  Plan: [place] staff assistance with cares, meals, activities and med admin         Electronically signed by: MAGNOLIA Alanis CNP             Sincerely,        MAGNOLIA Alanis CNP

## 2022-08-17 ENCOUNTER — LAB REQUISITION (OUTPATIENT)
Dept: LAB | Facility: CLINIC | Age: 87
End: 2022-08-17
Payer: MEDICARE

## 2022-08-17 DIAGNOSIS — E87.1 HYPO-OSMOLALITY AND HYPONATREMIA: ICD-10-CM

## 2022-09-06 ENCOUNTER — TELEPHONE (OUTPATIENT)
Dept: GERIATRICS | Facility: CLINIC | Age: 87
End: 2022-09-06

## 2022-09-06 ENCOUNTER — TRANSFERRED RECORDS (OUTPATIENT)
Dept: HEALTH INFORMATION MANAGEMENT | Facility: CLINIC | Age: 87
End: 2022-09-06

## 2022-09-06 NOTE — TELEPHONE ENCOUNTER
Jc Cloud   3/25/1931      Orders 2022:  1. XR 2 views of right knee for pain and edema      David Muhammad, APRN CNP on 2022 at 1:57 PM

## 2022-09-07 ENCOUNTER — TELEPHONE (OUTPATIENT)
Dept: GERIATRICS | Facility: CLINIC | Age: 87
End: 2022-09-07

## 2022-09-07 NOTE — TELEPHONE ENCOUNTER
"Ortho Nursing home visit    Jc Cloud is a 89 year old male who resides at [facility]      Patient had increased right knee pain with hs of falls, new x-rays taken at the facility showed; \" Right patella fracture;\" Patient was sent to the Urgent care for treatment; After discussion with is joseluis Omer; Patient was treated in knee immobilizer, with diag as NO FRACTURE:      Past Medical History:   Diagnosis Date     Alcohol abuse      Anxiety      Atrial fibrillation (H)      Closed fracture of multiple ribs of left side with routine healing      Cognitive impairment      Colon, diverticulosis      Coronary artery disease      Falls frequently      Hyperlipidemia      Intestinal adhesions with obstruction (H)      Neuropathy      Orthostatic hypotension      GLORIA (obstructive sleep apnea)      Pacemaker      Primary hypertension      Primary osteoarthritis involving multiple joints      Recurrent major depressive disorder, in remission (H)      Sleep apnea       Past Surgical History:   Procedure Laterality Date     ADENOIDECTOMY       BYPASS GRAFT ARTERY CORONARY       CATARACT IOL, RT/LT       HERNIA REPAIR       OPEN REDUCTION INTERNAL FIXATION WRIST Right 1/3/2020    Procedure: OPEN REDUCTION INTERNAL FIXATION RIGHT DISTAL RADIUS FRACTURE;  Surgeon: Susan Mcdonald MD;  Location:  OR        Allergies   Allergen Reactions     Atenolol      Lisinopril      Meperidine      Metoprolol      Quetiapine      Triamterene       There were no vitals taken for this visit.       X-rays show : transverse fracture distal 3rd of the right patella; Old vs acute;    ASSESSMENT / PLAN:    After review of medical records, patient had a fall in 2018 and was seen at : with diag of right patella fracture; ;    Given diag in 2018; this may represent an old fracture still seen on x-ray in 89 Y/O male;    I have discussed findings with joseluis Omer today. Will only use a brace PRN: discussed using a lift chair or " higher chair for ease in standing;     Will continue to follow as needed his son has my contact #    [phone]          Lee MANZO-C  [phone] Cell

## 2022-09-08 ENCOUNTER — ASSISTED LIVING VISIT (OUTPATIENT)
Dept: GERIATRICS | Facility: CLINIC | Age: 87
End: 2022-09-08
Payer: MEDICARE

## 2022-09-08 VITALS — WEIGHT: 159.6 LBS | TEMPERATURE: 97.7 F | BODY MASS INDEX: 29.37 KG/M2 | HEIGHT: 62 IN | OXYGEN SATURATION: 93 %

## 2022-09-08 DIAGNOSIS — M25.561 ACUTE PAIN OF RIGHT KNEE: Primary | ICD-10-CM

## 2022-09-08 DIAGNOSIS — I95.1 ORTHOSTATIC HYPOTENSION: ICD-10-CM

## 2022-09-08 DIAGNOSIS — Z95.0 CARDIAC PACEMAKER IN SITU: ICD-10-CM

## 2022-09-08 DIAGNOSIS — M15.0 PRIMARY OSTEOARTHRITIS INVOLVING MULTIPLE JOINTS: ICD-10-CM

## 2022-09-08 DIAGNOSIS — R41.89 COGNITIVE IMPAIRMENT: ICD-10-CM

## 2022-09-08 DIAGNOSIS — F10.10 ALCOHOL ABUSE: ICD-10-CM

## 2022-09-08 DIAGNOSIS — I25.10 CORONARY ARTERY DISEASE INVOLVING NATIVE HEART WITHOUT ANGINA PECTORIS, UNSPECIFIED VESSEL OR LESION TYPE: ICD-10-CM

## 2022-09-08 DIAGNOSIS — I10 PRIMARY HYPERTENSION: ICD-10-CM

## 2022-09-08 DIAGNOSIS — I48.20 CHRONIC A-FIB (H): ICD-10-CM

## 2022-09-08 DIAGNOSIS — Z87.81 HX OF FRACTURE OF PATELLA: ICD-10-CM

## 2022-09-08 PROBLEM — Z87.39 HISTORY OF RHABDOMYOLYSIS: Status: ACTIVE | Noted: 2021-12-16

## 2022-09-08 PROBLEM — Z91.81 AT HIGH RISK FOR FALLS: Status: ACTIVE | Noted: 2021-12-28

## 2022-09-08 PROBLEM — M54.16 RADICULOPATHY OF LUMBAR REGION: Status: ACTIVE | Noted: 2017-05-25

## 2022-09-08 PROBLEM — Z79.01 CHRONIC ANTICOAGULATION: Status: ACTIVE | Noted: 2021-12-16

## 2022-09-08 PROBLEM — R79.89 ELEVATED TROPONIN: Status: ACTIVE | Noted: 2021-12-16

## 2022-09-08 PROBLEM — R25.2 MUSCLE CRAMPS: Status: ACTIVE | Noted: 2018-05-17

## 2022-09-08 NOTE — PROGRESS NOTES
"[facility] GERIATRICS    Chief Complaint   Patient presents with     RECHECK     HPI:  Jc Cloud is a 89 year old  (3/25/1931), who is being seen today for an episodic care visit at: THE [facility]) [[id number]].   He moved to this facility 2/14/2022 for a higher level of care than could be managed at home. Medical history significant for CAD s/p CABG, afib, second degree AV block, biventricular cardiac pacemaker, HTN, orthostatic hypotension, etoh abuse, neuropathy, depression, anxiety, insomnia, osteoarthritis, sleep apnea, essential tremor, cognitive impairment.  He was  hospitalized at [facility] 1/24-1/27/2022 after a fall at home resulting in left rib fractures. CXR showed acute appearing fractures of the left lateral ribs 7-9, chronic rib deformity of right 6th rib and suspected small left pneumothorax. He was noted to have a reddish stool in the ED and guaiac was positive. Hgb remained stable and no s/s of GI bleed. Pacemaker interrogation showed normal function. He discharged 1/27/2021 to [facility] tcu. Vitamin B12, folic acid and thiamine were started. Vitamin D was also started. Oxycodone was discontinued. At tcu discharge, he was ambulating 300 ft with walker and supervision. Required supervision to stand by assist with cares. BIMS 14/15, SBT 2/28, MOCA 16/30.    Today's concern is:      Acute pain of right knee  Primary osteoarthritis involving multiple joints  Hx of fracture of patella  Chronic a-fib (H)  Cardiac pacemaker in situ  Coronary artery disease involving native heart without angina pectoris, unspecified vessel or lesion type  Primary hypertension  Orthostatic hypotension  Alcohol abuse  Cognitive impairment   He is seen today for evaluation of a right patella fracture. He developed acute pain and edema of his right knee and did not remember a fall or trauma. XR done 9/6/2022 showed \"multiple fractures\" of the patella. His son Kan Cloud took him to [facility] " "Urgent Care, where they were told he did not have a fracture. He was given a brace and a walker. Case was discussed with Ty EASTMAN who reviewed the images done at the facility which show a transverse fracture of the right patella. XR in 2018 showed a right patella fracture, so most likely this is an old fracture (please refer to his note 9/7/2022).   Patient reports pain continues, but is \"slightly better.\" He's unsteady with the brace and isn't using it. Denies other areas of pain and is otherwise feeling well. Good appetite. Typically ambulates without a device and is independent with cares.       Allergies, and PMH/PSH reviewed in EPIC today    REVIEW OF SYSTEMS:  4 point ROS including Respiratory, CV, GI and , other than that noted in the HPI,  is negative    Objective:   Temp 97.7  F (36.5  C)   Ht 1.575 m (5' 2\")   Wt 72.4 kg (159 lb 9.6 oz)   SpO2 93%   BMI 29.19 kg/m    GENERAL APPEARANCE:  Alert, in no distress   ENT:  [place], oropharynx clear  EYES:  conjunctiva and lids normal  NECK: no adenopathy or  thyromegaly  RESP:  lungs clear to auscultation  CV:  regular rate and rhythm, no murmur, no LE edema   ABDOMEN:  soft, nontender, no distension, no  masses  M/S: in bed. WILKES with good strength. No edema, erythema or warmth of left knee, full ROM   SKIN:  no rashes or open areas  PSYCH:  oriented X 3, insight and judgement impaired, memory impaired, mood normal     Recent labs in [facility] reviewed by me today.     ASSESSMENT / PLAN:  (M25.561) Acute pain of right knee  (primary encounter diagnosis)  (M89.49) Primary osteoarthritis involving multiple joints  (Z87.81) Hx of fracture of patella  Comment: acute flare of pain related to arthritis and old patella fracture.   Plan: continue tylenol. Refer to [facility] for PHYSICAL THERAPY/OT.   Discussed with his son Kan Cloud, who agrees with plan of care.     (I48.20) Chronic a-fib (H)  (Z95.0) Cardiac pacemaker in situ  Comment: rate " controlled in the 60s   Plan: continue metoprolol, apixaban     (I25.10) Coronary artery disease involving native heart without angina pectoris, unspecified vessel or lesion type  Comment: no acute issues   Plan: continue metoprolol     (I10) Primary hypertension  (I95.1) Orthostatic hypotension  Comment: recent BPs: 138/72, 139/96    Sodium chloride tabs were decreased to daily last month   Plan: continue metoprolol, midodrine, salt tabs. BMP.     (F10.10) Alcohol abuse  Comment: long standing. Has not been a significant issue since moving to [place]   Plan: 1 drink daily administered by staff.     (R41.89) Cognitive impairment  Comment: mild to moderate deficits, improved since AL admission   Plan: AL staff assistance with cares, meals, activities, med admin         Electronically signed by: MAGNOLIA Alanis CNP

## 2022-09-08 NOTE — LETTER
9/8/2022        RE: Jc Cloud  [facility]  [address]  [facility] [address]        [facility] GERIATRICS    Chief Complaint   Patient presents with     RECHECK     HPI:  Jc Cloud is a 89 year old  (3/25/1931), who is being seen today for an episodic care visit at: THE [facility] [[id number]].   He moved to this facility 2/14/2022 for a higher level of care than could be managed at home. Medical history significant for CAD s/p CABG, afib, second degree AV block, biventricular cardiac pacemaker, HTN, orthostatic hypotension, etoh abuse, neuropathy, depression, anxiety, insomnia, osteoarthritis, sleep apnea, essential tremor, cognitive impairment.  He was  hospitalized at [facility] 1/24-1/27/2022 after a fall at home resulting in left rib fractures. CXR showed acute appearing fractures of the left lateral ribs 7-9, chronic rib deformity of right 6th rib and suspected small left pneumothorax. He was noted to have a reddish stool in the ED and guaiac was positive. Hgb remained stable and no s/s of GI bleed. Pacemaker interrogation showed normal function. He discharged 1/27/2021 to [facility] tcu. Vitamin B12, folic acid and thiamine were started. Vitamin D was also started. Oxycodone was discontinued. At tcu discharge, he was ambulating 300 ft with walker and supervision. Required supervision to stand by assist with cares. BIMS 14/15, SBT 2/28, MOCA 16/30.    Today's concern is:      Acute pain of right knee  Primary osteoarthritis involving multiple joints  Hx of fracture of patella  Chronic a-fib (H)  Cardiac pacemaker in situ  Coronary artery disease involving native heart without angina pectoris, unspecified vessel or lesion type  Primary hypertension  Orthostatic hypotension  Alcohol abuse  Cognitive impairment   He is seen today for evaluation of a right patella fracture. He developed acute pain and edema of his right knee and did not remember a  "fall or trauma. XR done 9/6/2022 showed \"multiple fractures\" of the patella. His son Kan Cloud took him to [facility]gent [facility], where they were told he did not have a fracture. He was given a brace and a walker. Case was discussed with Ty EASTMAN who reviewed the images done at the facility which show a transverse fracture of the right patella. XR in 2018 showed a right patella fracture, so most likely this is an old fracture (please refer to his note 9/7/2022).   Patient reports pain continues, but is \"slightly better.\" He's unsteady with the brace and isn't using it. Denies other areas of pain and is otherwise feeling well. Good appetite. Typically ambulates without a device and is independent with cares.       Allergies, and PMH/PSH reviewed in EPIC today    REVIEW OF SYSTEMS:  4 point ROS including Respiratory, CV, GI and , other than that noted in the HPI,  is negative    Objective:   Temp 97.7  F (36.5  C)   Ht 1.575 m (5' 2\")   Wt 72.4 kg (159 lb 9.6 oz)   SpO2 93%   BMI 29.19 kg/m    GENERAL APPEARANCE:  Alert, in no distress   ENT:  [place], oropharynx clear  EYES:  conjunctiva and lids normal  NECK: no adenopathy or  thyromegaly  RESP:  lungs clear to auscultation  CV:  regular rate and rhythm, no murmur, no LE edema   ABDOMEN:  soft, nontender, no distension, no  masses  M/S: in bed. WILKES with good strength. No edema, erythema or warmth of left knee, full ROM   SKIN:  no rashes or open areas  PSYCH:  oriented X 3, insight and judgement impaired, memory impaired, mood normal     Recent labs in [facility] reviewed by me today.     ASSESSMENT / PLAN:  (M25.561) Acute pain of right knee  (primary encounter diagnosis)  (M89.49) Primary osteoarthritis involving multiple joints  (Z87.81) Hx of fracture of patella  Comment: acute flare of pain related to arthritis and old patella fracture.   Plan: continue tylenol. Refer to [facility] for PHYSICAL THERAPY/OT.   Discussed with his son Kan Cloud, who " agrees with plan of care.     (I48.20) Chronic a-fib (H)  (Z95.0) Cardiac pacemaker in situ  Comment: rate controlled in the 60s   Plan: continue metoprolol, apixaban     (I25.10) Coronary artery disease involving native heart without angina pectoris, unspecified vessel or lesion type  Comment: no acute issues   Plan: continue metoprolol     (I10) Primary hypertension  (I95.1) Orthostatic hypotension  Comment: recent BPs: 138/72, 139/96    Sodium chloride tabs were decreased to daily last month   Plan: continue metoprolol, midodrine, salt tabs. BMP.     (F10.10) Alcohol abuse  Comment: long standing. Has not been a significant issue since moving to [place]   Plan: 1 drink daily administered by staff.     (R41.89) Cognitive impairment  Comment: mild to moderate deficits, improved since AL admission   Plan: AL staff assistance with cares, meals, activities, med admin         Electronically signed by: MAGNOLIA Alanis CNP           Sincerely,        MAGNOLIA Alanis CNP

## 2022-09-14 ENCOUNTER — OFFICE VISIT (OUTPATIENT)
Dept: URGENT CARE | Facility: URGENT CARE | Age: 87
End: 2022-09-14
Payer: MEDICARE

## 2022-09-14 ENCOUNTER — LAB REQUISITION (OUTPATIENT)
Dept: LAB | Facility: CLINIC | Age: 87
End: 2022-09-14
Payer: MEDICARE

## 2022-09-14 VITALS
TEMPERATURE: 97.9 F | SYSTOLIC BLOOD PRESSURE: 116 MMHG | WEIGHT: 165 LBS | OXYGEN SATURATION: 95 % | RESPIRATION RATE: 14 BRPM | BODY MASS INDEX: 21.87 KG/M2 | DIASTOLIC BLOOD PRESSURE: 70 MMHG | HEART RATE: 64 BPM | HEIGHT: 73 IN

## 2022-09-14 DIAGNOSIS — I10 ESSENTIAL (PRIMARY) HYPERTENSION: ICD-10-CM

## 2022-09-14 DIAGNOSIS — S61.215A LACERATION OF LEFT RING FINGER WITHOUT FOREIGN BODY, NAIL DAMAGE STATUS UNSPECIFIED, INITIAL ENCOUNTER: Primary | ICD-10-CM

## 2022-09-14 PROCEDURE — 99213 OFFICE O/P EST LOW 20 MIN: CPT | Performed by: PHYSICIAN ASSISTANT

## 2022-09-14 NOTE — PROGRESS NOTES
"SUBJECTIVE:     Chief Complaint   Patient presents with     Urgent Care     Musculoskeletal Problem     Smashed left 4th finger when he was getting up from a chair, pinched finger between chair and table. This morning     Jc Cloud is a 89 year old male who presents to the clinic with a laceration on the left ring finger sustained 1 hour(s) ago.  This is a accidental injury.    Mechanism of injury: Pinched on chair.    Associated symptoms: Denies loss of consciousness, vomiting or confusion.  Denies numbness, weakness, or loss of function  Last tetanus booster within 10 years: yes    EXAM:   The patient appears today in alert,no apparent distress distress  VITALS: /70   Pulse 64   Temp 97.9  F (36.6  C) (Temporal)   Resp 14   Ht 1.854 m (6' 1\")   Wt 74.8 kg (165 lb)   SpO2 95%   BMI 21.77 kg/m      Size of laceration: 2 centimeters  Characteristics of the laceration: bleeding- mild, clean and flap type  Tendon function intact: yes  Sensation to light touch intact: yes  Pulses intact: yes  Picture included in patient's chart: no    Assessment:  (S61.215A) Laceration of left ring finger without foreign body, nail damage status unspecified, initial encounter  (primary encounter diagnosis)  Plan: REPAIR SUPERFICIAL, WOUND BODY < =2.5CM    PLAN:  PROCEDURE NOTE::  Patient's wound cannot be repaired with stitches or glue as it is a flap and the skin flap does not appear to be viable skin.  The extra skin was debrided and the wound was cleaned with iodine.  I placed Surgifoam along with Telfa and Coban compression dressing.  Patient's family was educated on wound care instructions for the next 10 days and also educated on signs of infection.      "

## 2022-09-15 LAB
ANION GAP SERPL CALCULATED.3IONS-SCNC: 9 MMOL/L (ref 7–15)
BUN SERPL-MCNC: 31.5 MG/DL (ref 8–23)
CALCIUM SERPL-MCNC: 8.2 MG/DL (ref 8.2–9.6)
CHLORIDE SERPL-SCNC: 107 MMOL/L (ref 98–107)
CREAT SERPL-MCNC: 1.48 MG/DL (ref 0.67–1.17)
DEPRECATED HCO3 PLAS-SCNC: 26 MMOL/L (ref 22–29)
GFR SERPL CREATININE-BSD FRML MDRD: 44 ML/MIN/1.73M2
GLUCOSE SERPL-MCNC: 56 MG/DL (ref 70–99)
POTASSIUM SERPL-SCNC: 6 MMOL/L (ref 3.4–5.3)
SODIUM SERPL-SCNC: 142 MMOL/L (ref 136–145)

## 2022-09-15 PROCEDURE — P9603 ONE-WAY ALLOW PRORATED MILES: HCPCS | Mod: ORL | Performed by: NURSE PRACTITIONER

## 2022-09-15 PROCEDURE — 80048 BASIC METABOLIC PNL TOTAL CA: CPT | Mod: ORL | Performed by: NURSE PRACTITIONER

## 2022-09-15 PROCEDURE — 36415 COLL VENOUS BLD VENIPUNCTURE: CPT | Mod: ORL | Performed by: NURSE PRACTITIONER

## 2022-09-19 ENCOUNTER — LAB REQUISITION (OUTPATIENT)
Dept: LAB | Facility: CLINIC | Age: 87
End: 2022-09-19
Payer: MEDICARE

## 2022-09-19 DIAGNOSIS — E87.6 HYPOKALEMIA: ICD-10-CM

## 2022-09-19 LAB
ANION GAP SERPL CALCULATED.3IONS-SCNC: 12 MMOL/L (ref 7–15)
BUN SERPL-MCNC: 34.3 MG/DL (ref 8–23)
CALCIUM SERPL-MCNC: 9.1 MG/DL (ref 8.2–9.6)
CHLORIDE SERPL-SCNC: 106 MMOL/L (ref 98–107)
CREAT SERPL-MCNC: 1.53 MG/DL (ref 0.67–1.17)
DEPRECATED HCO3 PLAS-SCNC: 25 MMOL/L (ref 22–29)
ERYTHROCYTE [DISTWIDTH] IN BLOOD BY AUTOMATED COUNT: 13.5 % (ref 10–15)
GFR SERPL CREATININE-BSD FRML MDRD: 43 ML/MIN/1.73M2
GLUCOSE SERPL-MCNC: 77 MG/DL (ref 70–99)
HCT VFR BLD AUTO: 46.3 % (ref 40–53)
HGB BLD-MCNC: 14.7 G/DL (ref 13.3–17.7)
MCH RBC QN AUTO: 34.2 PG (ref 26.5–33)
MCHC RBC AUTO-ENTMCNC: 31.7 G/DL (ref 31.5–36.5)
MCV RBC AUTO: 108 FL (ref 78–100)
PLATELET # BLD AUTO: 231 10E3/UL (ref 150–450)
POTASSIUM SERPL-SCNC: 5.1 MMOL/L (ref 3.4–5.3)
RBC # BLD AUTO: 4.3 10E6/UL (ref 4.4–5.9)
SODIUM SERPL-SCNC: 143 MMOL/L (ref 136–145)
WBC # BLD AUTO: 6.6 10E3/UL (ref 4–11)

## 2022-09-19 PROCEDURE — 36415 COLL VENOUS BLD VENIPUNCTURE: CPT | Mod: ORL | Performed by: NURSE PRACTITIONER

## 2022-09-19 PROCEDURE — 80048 BASIC METABOLIC PNL TOTAL CA: CPT | Mod: ORL | Performed by: NURSE PRACTITIONER

## 2022-09-19 PROCEDURE — 85027 COMPLETE CBC AUTOMATED: CPT | Mod: ORL | Performed by: NURSE PRACTITIONER

## 2022-09-19 PROCEDURE — P9603 ONE-WAY ALLOW PRORATED MILES: HCPCS | Mod: ORL | Performed by: NURSE PRACTITIONER

## 2022-09-22 ENCOUNTER — ASSISTED LIVING VISIT (OUTPATIENT)
Dept: GERIATRICS | Facility: CLINIC | Age: 87
End: 2022-09-22
Payer: MEDICARE

## 2022-09-22 VITALS
BODY MASS INDEX: 21.2 KG/M2 | HEART RATE: 60 BPM | SYSTOLIC BLOOD PRESSURE: 140 MMHG | OXYGEN SATURATION: 94 % | HEIGHT: 73 IN | DIASTOLIC BLOOD PRESSURE: 80 MMHG | WEIGHT: 160 LBS | RESPIRATION RATE: 16 BRPM | TEMPERATURE: 98 F

## 2022-09-22 DIAGNOSIS — N18.31 STAGE 3A CHRONIC KIDNEY DISEASE (H): ICD-10-CM

## 2022-09-22 DIAGNOSIS — I10 PRIMARY HYPERTENSION: ICD-10-CM

## 2022-09-22 DIAGNOSIS — Z95.0 CARDIAC PACEMAKER IN SITU: ICD-10-CM

## 2022-09-22 DIAGNOSIS — I48.20 CHRONIC A-FIB (H): ICD-10-CM
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
## 2022-09-22 DIAGNOSIS — Z87.81 HX OF FRACTURE OF PATELLA: ICD-10-CM

## 2022-09-22 DIAGNOSIS — I95.1 ORTHOSTATIC HYPOTENSION: ICD-10-CM

## 2022-09-22 DIAGNOSIS — F10.10 ALCOHOL ABUSE: ICD-10-CM

## 2022-09-22 DIAGNOSIS — R53.81 PHYSICAL DECONDITIONING: ICD-10-CM

## 2022-09-22 DIAGNOSIS — M15.0 PRIMARY OSTEOARTHRITIS INVOLVING MULTIPLE JOINTS: ICD-10-CM

## 2022-09-22 DIAGNOSIS — M25.561 ACUTE PAIN OF RIGHT KNEE: ICD-10-CM

## 2022-09-22 DIAGNOSIS — S61.215D LACERATION OF LEFT RING FINGER WITHOUT FOREIGN BODY WITHOUT DAMAGE TO NAIL, SUBSEQUENT ENCOUNTER: ICD-10-CM

## 2022-09-22 DIAGNOSIS — R41.89 COGNITIVE IMPAIRMENT: ICD-10-CM

## 2022-09-22 DIAGNOSIS — I25.10 CORONARY ARTERY DISEASE INVOLVING NATIVE HEART WITHOUT ANGINA PECTORIS, UNSPECIFIED VESSEL OR LESION TYPE: ICD-10-CM

## 2022-09-22 PROCEDURE — 99283 EMERGENCY DEPT VISIT LOW MDM: CPT | Performed by: EMERGENCY MEDICINE

## 2022-09-22 PROCEDURE — 99282 EMERGENCY DEPT VISIT SF MDM: CPT | Performed by: EMERGENCY MEDICINE

## 2022-09-22 RX ORDER — ACETAMINOPHEN 500 MG
1000 TABLET ORAL 2 TIMES DAILY
Qty: 100 TABLET | Refills: 11 | Status: SHIPPED | OUTPATIENT
Start: 2022-09-22 | End: 2023-01-01

## 2022-09-22 NOTE — LETTER
9/22/2022        RE: Jc Cloud  [facility]  [address] [facility] [address]        [facility] GERIATRICS    Chief Complaint   Patient presents with     RECHECK     HPI:  Jc Cloud is a 89 year old  (3/25/1931), who is being seen today for an episodic care visit at: THE [facility] ([facility]) [[id number]].   He moved to this facility 2/14/2022 for a higher level of care than could be managed at home. Medical history significant for CAD s/p CABG, afib, second degree AV block, biventricular cardiac pacemaker, HTN, orthostatic hypotension, etoh abuse, neuropathy, depression, anxiety, insomnia, osteoarthritis, sleep apnea, essential tremor, cognitive impairment.  He was  hospitalized at [facility] 1/24-1/27/2022 after a fall at home resulting in left rib fractures. CXR showed acute appearing fractures of the left lateral ribs 7-9, chronic rib deformity of right 6th rib and suspected small left pneumothorax. He was noted to have a reddish stool in the ED and guaiac was positive. Hgb remained stable and no s/s of GI bleed. Pacemaker interrogation showed normal function. He discharged 1/27/2021 to [facility] tcu. Vitamin B12, folic acid and thiamine were started. Vitamin D was also started. Oxycodone was discontinued. At tcu discharge, he was ambulating 300 ft with walker and supervision. Required supervision to stand by assist with cares. BIMS 14/15, SBT 2/28, MOCA 16/30.    Today's concern is:     Fall, initial encounter  Acute pain of right knee  Primary osteoarthritis involving multiple joints  Hx of fracture of patella  Stage 3a chronic kidney disease (H)  Chronic a-fib (H)  Cardiac pacemaker in situ  Coronary artery disease involving native heart without angina pectoris, unspecified vessel or lesion type  Primary hypertension  Orthostatic hypotension  Laceration of left ring finger without foreign body without damage to nail, subsequent encounter  Alcohol  "abuse  Cognitive impairment  Physical deconditioning  He is seen today to follow up after a fall last night and recent acute issues. He was found on the floor in his bedroom last night with no apparent injury. Initially denies hitting his head, but then states that he doesn't remember the fall. He's tired today and wants to stay in bed.  Reports \"generalized\" pain, which he attributes to the fall and fatigue. Able to ambulate to the bathroom with his walker.Reports the acute pain of his right knee has resolved. XR done 9/6/2022 was concerning for fracture, then reviewed by Ty EASTMAN who felt the images were consistent with his old fracture from 2018.  He was seen in Urgent Care for a laceration of his left ring finger after he pinched it on a chair. Skin was debrided and a surgifoam dressing with telfa and coban was applied. Nurses are changing the dressing and report the wound is healing.   Patient was seen in the morning, then seen a second time after his son Kan Cloud came to the facility and was concerned that his father was in bed and hadn't eaten breakfast. He questions if he needs to take patient to the ED for imaging, including CT head. During both visits, patient denied feeling ill, \"I'm just tired today.\"     Allergies, and PMH/PSH reviewed in EPIC today    REVIEW OF SYSTEMS:  Limited secondary to cognitive impairment. Positives as noted under HPI.     Objective:   BP (!) 140/80   Pulse 60   Temp 98  F (36.7  C)   Resp 16   Ht 1.854 m (6' 1\")   Wt 72.6 kg (160 lb)   SpO2 94%   BMI 21.11 kg/m    GENERAL APPEARANCE:  Alert, in no distress, appears fatigued   ENT:  [place], oropharynx clear  EYES:  conjunctiva and lids normal  NECK: no adenopathy or  thyromegaly  RESP:  lungs clear to auscultation, no crackles or wheezes   CV:  irregular rate and rhythm, no murmur, no LE edema   ABDOMEN:  soft, nontender, no distension, no  masses  M/S: in bed. WILKES with good strength. No edema, erythema or warmth " of left knee, full ROM.   SKIN:  no bruises, abrasions or rashes. Wound of left 3rd finger clean and dry, no erythema   PSYCH:  oriented X 2-3, insight and judgement impaired, memory impaired, mood normal     Recent labs in [facility] reviewed by me today.       ASSESSMENT / PLAN:  (W19.XXXA) Fall, initial encounter  (primary encounter diagnosis)  Comment: unwitnessed fall last night. He appears tired today, but no s/s of acute illness or neurological event.   Plan: monitor symptoms. Continue therapies.   Discussed with his son Kan Cloud, who will stay and make sure he eats lunch.   Discussed with staff.     (M25.561) Acute pain of right knee  (M89.49) Primary osteoarthritis involving multiple joints  (Z87.81) Hx of fracture of patella  Comment: pain has improved  Plan: continue tylenol. Therapies. Walker for mobility     (N18.31) Stage 3a chronic kidney disease (H)  Comment: creatinine 1.53 on 9/19/2022. Baseline creatinine 1.0-1.3.   K normal at 5.1   Plan: encourage fluids. Avoid nephrotoxins. Recheck BMP in the near future.     (I48.20) Chronic a-fib (H)  (Z95.0) Cardiac pacemaker in situ  Comment: rate controlled: 60-75. Rhythm slightly irregular today   Plan: continue apixaban, metoprolol. Pacemaker check as scheduled with [facility].     (I25.10) Coronary artery disease involving native heart without angina pectoris, unspecified vessel or lesion type  Comment: no chest pain or acute symptoms   Plan: continue metoprolol     (I10) Primary hypertension  (I95.1) Orthostatic hypotension  Comment: hypotension has improved with his improved oral intake and less alcohol use. BPs: 136/72, 141/76    Na 143 on 9/19/2022  Plan: discontinue sodium chloride tabs. Continue metoprolol and midodrine.     (S61.215D) Laceration of left ring finger without foreign body without damage to nail, subsequent encounter  Comment: healing without signs of infection   Plan: continue wound care as ordered     (F10.10) Alcohol  abuse  Comment: chronic. Has not been a significant issue since coming to [place]. Good appetite and has had a desirable weight gain of almost 20 lbs .  Plan: discontinue thiamine, folic acid and B12.     (R41.89) Cognitive impairment  Comment: mild deficits at baseline, but has episodes of increased confusion. No signs of an acute neurological change today   Plan: [place] staff assistance with cares, meals, activities, med admin. If neuro status changes, will send to ED given the fall last night and chronic anticoagulation. Son agrees with this plan.     (R53.81) Physical deconditioning  Comment: progressing in therapies   Plan: continue PHYSICAL THERAPY/OT with [facility].             Total time spent with patient visit at the [place] facility was 60 mins including patient visit X 2, review of records and meeting with his son. Greater than 50% of total time spent in counseling and coordinating care due to fall last night and multiple comorbidities. Counseling patient and son re: falls, home therapies, plan of care and medications. Coordinating the following with facility staff: monitoring symptoms, ED eval if any change in neuro status, plan of care.       Electronically signed by: MAGNOLIA Alanis CNP           Sincerely,        MAGNOLIA Alanis CNP

## 2022-09-22 NOTE — PROGRESS NOTES
[facility] GERIATRICS    Chief Complaint   Patient presents with     RECHECK     HPI:  Jc Cloud is a 89 year old  (3/25/1931), who is being seen today for an episodic care visit at: THE [facility]) [[id number]].   He moved to this facility 2/14/2022 for a higher level of care than could be managed at home. Medical history significant for CAD s/p CABG, afib, second degree AV block, biventricular cardiac pacemaker, HTN, orthostatic hypotension, etoh abuse, neuropathy, depression, anxiety, insomnia, osteoarthritis, sleep apnea, essential tremor, cognitive impairment.  He was  hospitalized at [facility] 1/24-1/27/2022 after a fall at home resulting in left rib fractures. CXR showed acute appearing fractures of the left lateral ribs 7-9, chronic rib deformity of right 6th rib and suspected small left pneumothorax. He was noted to have a reddish stool in the ED and guaiac was positive. Hgb remained stable and no s/s of GI bleed. Pacemaker interrogation showed normal function. He discharged 1/27/2021 to [facility] tcu. Vitamin B12, folic acid and thiamine were started. Vitamin D was also started. Oxycodone was discontinued. At tcu discharge, he was ambulating 300 ft with walker and supervision. Required supervision to stand by assist with cares. BIMS 14/15, SBT 2/28, MOCA 16/30.    Today's concern is:     Fall, initial encounter  Acute pain of right knee  Primary osteoarthritis involving multiple joints  Hx of fracture of patella  Stage 3a chronic kidney disease (H)  Chronic a-fib (H)  Cardiac pacemaker in situ  Coronary artery disease involving native heart without angina pectoris, unspecified vessel or lesion type  Primary hypertension  Orthostatic hypotension  Laceration of left ring finger without foreign body without damage to nail, subsequent encounter  Alcohol abuse  Cognitive impairment  Physical deconditioning  He is seen today to follow up after a fall last night and recent  "acute issues. He was found on the floor in his bedroom last night with no apparent injury. Initially denies hitting his head, but then states that he doesn't remember the fall. He's tired today and wants to stay in bed.  Reports \"generalized\" pain, which he attributes to the fall and fatigue. Able to ambulate to the bathroom with his walker.Reports the acute pain of his right knee has resolved. XR done 9/6/2022 was concerning for fracture, then reviewed by Ty EASTMAN who felt the images were consistent with his old fracture from 2018.  He was seen in Urgent Care for a laceration of his left ring finger after he pinched it on a chair. Skin was debrided and a surgifoam dressing with telfa and coban was applied. Nurses are changing the dressing and report the wound is healing.   Patient was seen in the morning, then seen a second time after his son Kan Cloud came to the facility and was concerned that his father was in bed and hadn't eaten breakfast. He questions if he needs to take patient to the ED for imaging, including CT head. During both visits, patient denied feeling ill, \"I'm just tired today.\"     Allergies, and PMH/PSH reviewed in EPIC today    REVIEW OF SYSTEMS:  Limited secondary to cognitive impairment. Positives as noted under HPI.     Objective:   BP (!) 140/80   Pulse 60   Temp 98  F (36.7  C)   Resp 16   Ht 1.854 m (6' 1\")   Wt 72.6 kg (160 lb)   SpO2 94%   BMI 21.11 kg/m    GENERAL APPEARANCE:  Alert, in no distress, appears fatigued   ENT:  [place], oropharynx clear  EYES:  conjunctiva and lids normal  NECK: no adenopathy or  thyromegaly  RESP:  lungs clear to auscultation, no crackles or wheezes   CV:  irregular rate and rhythm, no murmur, no LE edema   ABDOMEN:  soft, nontender, no distension, no  masses  M/S: in bed. WILKES with good strength. No edema, erythema or warmth of left knee, full ROM.   SKIN:  no bruises, abrasions or rashes. Wound of left 3rd finger clean and dry, no erythema "   PSYCH:  oriented X 2-3, insight and judgement impaired, memory impaired, mood normal     Recent labs in EPIC reviewed by me today.       ASSESSMENT / PLAN:  (W19.XXXA) Fall, initial encounter  (primary encounter diagnosis)  Comment: unwitnessed fall last night. He appears tired today, but no s/s of acute illness or neurological event.   Plan: monitor symptoms. Continue therapies.   Discussed with his son Kan Cloud, who will stay and make sure he eats lunch.   Discussed with staff.     (M25.561) Acute pain of right knee  (M89.49) Primary osteoarthritis involving multiple joints  (Z87.81) Hx of fracture of patella  Comment: pain has improved  Plan: continue tylenol. Therapies. Walker for mobility     (N18.31) Stage 3a chronic kidney disease (H)  Comment: creatinine 1.53 on 9/19/2022. Baseline creatinine 1.0-1.3.   K normal at 5.1   Plan: encourage fluids. Avoid nephrotoxins. Recheck BMP in the near future.     (I48.20) Chronic a-fib (H)  (Z95.0) Cardiac pacemaker in situ  Comment: rate controlled: 60-75. Rhythm slightly irregular today   Plan: continue apixaban, metoprolol. Pacemaker check as scheduled with [facility].     (I25.10) Coronary artery disease involving native heart without angina pectoris, unspecified vessel or lesion type  Comment: no chest pain or acute symptoms   Plan: continue metoprolol     (I10) Primary hypertension  (I95.1) Orthostatic hypotension  Comment: hypotension has improved with his improved oral intake and less alcohol use. BPs: 136/72, 141/76    Na 143 on 9/19/2022  Plan: discontinue sodium chloride tabs. Continue metoprolol and midodrine.     (S61.215D) Laceration of left ring finger without foreign body without damage to nail, subsequent encounter  Comment: healing without signs of infection   Plan: continue wound care as ordered     (F10.10) Alcohol abuse  Comment: chronic. Has not been a significant issue since coming to AL. Good appetite and has had a desirable  weight gain of almost 20 lbs .  Plan: discontinue thiamine, folic acid and B12.     (R41.89) Cognitive impairment  Comment: mild deficits at baseline, but has episodes of increased confusion. No signs of an acute neurological change today   Plan: AL staff assistance with cares, meals, activities, med admin. If neuro status changes, will send to ED given the fall last night and chronic anticoagulation. Son agrees with this plan.     (R53.81) Physical deconditioning  Comment: progressing in therapies   Plan: continue PHYSICAL THERAPY/OT with [facility].             Total time spent with patient visit at the AL facility was 60 mins including patient visit X 2, review of records and meeting with his son. Greater than 50% of total time spent in counseling and coordinating care due to fall last night and multiple comorbidities. Counseling patient and son re: falls, home therapies, plan of care and medications. Coordinating the following with facility staff: monitoring symptoms, ED eval if any change in neuro status, plan of care.       Electronically signed by: MAGNOLIA Alanis CNP

## 2022-09-23 ENCOUNTER — HOSPITAL ENCOUNTER (EMERGENCY)
Facility: CLINIC | Age: 87
Discharge: HOME | End: 2022-09-23
Attending: EMERGENCY MEDICINE | Admitting: EMERGENCY MEDICINE
Payer: MEDICARE

## 2022-09-23 DIAGNOSIS — S51.812A SKIN TEAR OF LEFT FOREARM WITHOUT COMPLICATION, INITIAL ENCOUNTER: ICD-10-CM
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
## 2022-09-23 ASSESSMENT — ENCOUNTER SYMPTOMS
COUGH: 0
ABDOMINAL PAIN: 0
FEVER: 0
NECK PAIN: 0

## 2022-09-23 NOTE — ED PROVIDER NOTES
[place] EMERGENCY DEPARTMENT ([facility])  9/22/22    History     Chief Complaint   Patient presents with     Fall     HPI  Jc Cloud is a 89 year old male with PMH significant for recurrent falls, HTN, CAD, and A. fib on Eliquis who presents to the ED for evaluation of a fall at his assisted living home earlier today.  Patient reports he was walking with his walker at approximately 10 PM when he went from tile to carpet and had a mechanical fall.  He does state that he thinks he hit his head, but denies current headache or focal spot of soreness on his head.  He does have a left forearm skin tear, but denies other injury from the fall.  He denies any other pain from the fall, no neck pain, abdominal pain, or back pain.  He denies recent cough, fever, or chest pain.     Past Medical History  Past Medical History:   Diagnosis Date     Alcohol abuse      Anxiety      Atrial fibrillation (H)      Closed fracture of multiple ribs of left side with routine healing      Cognitive impairment      Colon, diverticulosis      Coronary artery disease      Falls frequently      Hyperlipidemia      Intestinal adhesions with obstruction (H)      Neuropathy      Orthostatic hypotension      GLORIA (obstructive sleep apnea)      Pacemaker      Primary hypertension      Primary osteoarthritis involving multiple joints      Recurrent major depressive disorder, in remission (H)      Sleep apnea      Past Surgical History:   Procedure Laterality Date     ADENOIDECTOMY       BYPASS GRAFT ARTERY CORONARY       CATARACT IOL, RT/LT       HERNIA REPAIR       OPEN REDUCTION INTERNAL FIXATION WRIST Right 1/3/2020    Procedure: OPEN REDUCTION INTERNAL FIXATION RIGHT DISTAL RADIUS FRACTURE;  Surgeon: Susan Mcdonald MD;  Location: [facility] OR     acetaminophen (ACETAMINOPHEN EXTRA STRENGTH) 500 MG tablet  apixaban ANTICOAGULANT (ELIQUIS ANTICOAGULANT) 2.5 MG tablet  cholecalciferol (VITAMIN D3) 10 mcg (400 units) TABS  tablet  gabapentin (NEURONTIN) 300 MG capsule  magnesium oxide (MAG-OX) 400 (240 Mg) MG tablet  metoprolol succinate ER (TOPROL-XL) 25 MG 24 hr tablet  midodrine (PROAMATINE) 5 MG tablet  mirtazapine (REMERON) 15 MG tablet  polyethylene glycol (MIRALAX) 17 GM/Dose powder  primidone (MYSOLINE) 50 MG tablet  traZODone (DESYREL) 50 MG tablet      Allergies   Allergen Reactions     Atenolol      Lisinopril      Meperidine      Metoprolol      Quetiapine      Triamterene      Family History  No family history on file.  Social History   Social History     Tobacco Use     Smoking status: Former Smoker     Packs/day: 2.00     Years: 30.00     Pack years: 60.00     Types: Cigarettes, Cigars     Start date:      Quit date:      Years since quittin.7     Smokeless tobacco: Never Used   Substance Use Topics     Alcohol use: Yes     Comment: 2 drinks/day     Drug use: Never      Past medical history, past surgical history, medications, allergies, family history, and social history were reviewed with the patient. No additional pertinent items.       Review of Systems   Constitutional: Negative for fever.   Respiratory: Negative for cough.    Cardiovascular: Negative for chest pain.   Gastrointestinal: Negative for abdominal pain.   Musculoskeletal: Negative for neck pain.   All other systems reviewed and are negative.    A complete review of systems was performed with pertinent positives and negatives noted in the HPI, and all other systems negative.    Physical Exam      Physical Exam  Vitals reviewed.   Constitutional:       General: He is not in acute distress.     Appearance: He is not diaphoretic.   HENT:      Head: Normocephalic and atraumatic.      Right Ear: External ear normal.      Left Ear: External ear normal.      Nose: Nose normal. No rhinorrhea.      Mouth/Throat:      Mouth: Mucous membranes are moist.   Eyes:      General: No scleral icterus.     Extraocular Movements: Extraocular movements intact.       Conjunctiva/sclera: Conjunctivae normal.   Cardiovascular:      Rate and Rhythm: Normal rate and regular rhythm.      Heart sounds: Normal heart sounds.   Pulmonary:      Effort: No respiratory distress.      Breath sounds: Normal breath sounds.   Abdominal:      General: Bowel sounds are normal.      Palpations: Abdomen is soft.      Tenderness: There is no abdominal tenderness.   Musculoskeletal:         General: No deformity. Normal range of motion.      Cervical back: Normal range of motion and neck supple.   Skin:     General: Skin is warm.      Findings: No rash.      Comments: Small Skin tear on the left forearm   Neurological:      Mental Status: Mental status is at baseline.   Psychiatric:         Mood and Affect: Mood normal.         Behavior: Behavior normal.         ED Course      Procedures   12:46 AM  The patient was seen and examined by Michael Lara DO in Room EDS.                        No results found for any visits on 09/22/22.  Medications - No data to display     Assessments & Plan (with Medical Decision Making)   89-year-old male presents to us with a chief complaint of evaluation for a fall.  The patient reports he had a mechanical fall as he transferred his walker from a noncarpeted to a carpeted floor.  He denies any pain or injury after the fall.  He reports he did have a mild bump of his head.  He denies any headache, nausea vomiting, dizziness or other concerning symptoms.  Given he is on anticoagulation we recommended a head CT.  As the patient has been here for several hours and does not want to stay he is requesting discharge.  He states he does understand there is a small possibility that there is a head bleed or other pathologic process related to trauma despite his lack of pain.  Regardless he wants to be discharged.  Dressing was placed on a small skin tear of his left forearm as well.    I have reviewed the nursing notes. I have reviewed the findings, diagnosis, plan  and need for follow up with the patient.    New Prescriptions    No medications on file       Final diagnoses:   Fall, initial encounter   Skin tear of left forearm without complication, initial encounter     I, Ace Verduzco, am serving as a trained medical scribe to document services personally performed by Michael Lara DO, based on the provider's statements to me.     IMichael DO, was physically present and have reviewed and verified the accuracy of this note documented by Ace Verduzco.    --  Michael Lara DO  [facility] EMERGENCY DEPARTMENT  9/22/2022     Michael Lara DO  09/23/22 [id number]

## 2022-09-25 PROBLEM — N18.31 STAGE 3A CHRONIC KIDNEY DISEASE (H): Status: ACTIVE | Noted: 2022-09-25

## 2022-10-06 ENCOUNTER — TELEPHONE (OUTPATIENT)
Dept: GERIATRICS | Facility: CLINIC | Age: 87
End: 2022-10-06

## 2022-10-06 NOTE — TELEPHONE ENCOUNTER
[facility] Triage Nurse Telephone Encounter    Provider: MAGNOLIA Ivan CNP   Facility: [facility]  Facility Type:  AL    Caller: Ana Rosa  Call Back Number: [phone]    Allergies:    Allergies   Allergen Reactions     Atenolol      Lisinopril      Meperidine      Metoprolol      Quetiapine      Triamterene         Reason for call: Pt was out to cardiology and had labs done, K+ level was 5.8. Per the son cardiology wanted him to f/u with his PCP.     Verbal Order/Direction given by Provider: BRUNO tomorrow     Provider giving Order:  Ana Lilia Miller MD    Verbal Order given to: Amanda Mcdonough RN

## 2022-10-07 ENCOUNTER — LAB REQUISITION (OUTPATIENT)
Dept: LAB | Facility: CLINIC | Age: 87
End: 2022-10-07
Payer: MEDICARE

## 2022-10-07 DIAGNOSIS — E87.6 HYPOKALEMIA: ICD-10-CM

## 2022-10-07 LAB
ANION GAP SERPL CALCULATED.3IONS-SCNC: 11 MMOL/L (ref 7–15)
BUN SERPL-MCNC: 31.1 MG/DL (ref 8–23)
CALCIUM SERPL-MCNC: 8.8 MG/DL (ref 8.2–9.6)
CHLORIDE SERPL-SCNC: 105 MMOL/L (ref 98–107)
CREAT SERPL-MCNC: 1.57 MG/DL (ref 0.67–1.17)
DEPRECATED HCO3 PLAS-SCNC: 26 MMOL/L (ref 22–29)
GFR SERPL CREATININE-BSD FRML MDRD: 41 ML/MIN/1.73M2
GLUCOSE SERPL-MCNC: 98 MG/DL (ref 70–99)
POTASSIUM SERPL-SCNC: 5.3 MMOL/L (ref 3.4–5.3)
SODIUM SERPL-SCNC: 142 MMOL/L (ref 136–145)

## 2022-10-07 PROCEDURE — P9603 ONE-WAY ALLOW PRORATED MILES: HCPCS | Mod: ORL | Performed by: INTERNAL MEDICINE

## 2022-10-07 PROCEDURE — 80048 BASIC METABOLIC PNL TOTAL CA: CPT | Mod: ORL | Performed by: INTERNAL MEDICINE

## 2022-10-07 PROCEDURE — 36415 COLL VENOUS BLD VENIPUNCTURE: CPT | Mod: ORL | Performed by: INTERNAL MEDICINE

## 2022-10-20 ENCOUNTER — ASSISTED LIVING VISIT (OUTPATIENT)
Dept: GERIATRICS | Facility: CLINIC | Age: 87
End: 2022-10-20
Payer: MEDICARE

## 2022-10-20 DIAGNOSIS — R41.89 COGNITIVE IMPAIRMENT: ICD-10-CM

## 2022-10-20 DIAGNOSIS — M15.0 PRIMARY OSTEOARTHRITIS INVOLVING MULTIPLE JOINTS: ICD-10-CM

## 2022-10-20 DIAGNOSIS — I95.1 ORTHOSTATIC HYPOTENSION: ICD-10-CM

## 2022-10-20 DIAGNOSIS — I48.20 CHRONIC A-FIB (H): ICD-10-CM

## 2022-10-20 DIAGNOSIS — Z95.0 CARDIAC PACEMAKER IN SITU: ICD-10-CM

## 2022-10-20 DIAGNOSIS — S61.215D LACERATION OF LEFT RING FINGER WITHOUT FOREIGN BODY WITHOUT DAMAGE TO NAIL, SUBSEQUENT ENCOUNTER: ICD-10-CM

## 2022-10-20 DIAGNOSIS — I10 PRIMARY HYPERTENSION: ICD-10-CM

## 2022-10-20 DIAGNOSIS — I25.10 CORONARY ARTERY DISEASE INVOLVING NATIVE HEART WITHOUT ANGINA PECTORIS, UNSPECIFIED VESSEL OR LESION TYPE: ICD-10-CM

## 2022-10-20 DIAGNOSIS — Z87.81 HX OF FRACTURE OF PATELLA: ICD-10-CM

## 2022-10-20 DIAGNOSIS — R53.81 PHYSICAL DECONDITIONING: ICD-10-CM

## 2022-10-20 DIAGNOSIS — M25.561 ACUTE PAIN OF RIGHT KNEE: Primary | ICD-10-CM

## 2022-10-20 DIAGNOSIS — I42.9 CARDIOMYOPATHY, UNSPECIFIED TYPE (H): ICD-10-CM

## 2022-10-20 NOTE — PROGRESS NOTES
[facility] GERIATRICS    Chief Complaint   Patient presents with     RECHECK     HPI:  Jc Cloud is a 89 year old  (3/25/1931), who is being seen today for an episodic care visit at: THE [facility]) [[id number]].   He moved to this facility 2/14/2022 for a higher level of care than could be managed at home. Medical history significant for CAD s/p CABG, afib, cardiomyopathy, second degree AV block, biventricular cardiac pacemaker, HTN, orthostatic hypotension, etoh abuse, neuropathy, depression, anxiety, insomnia, osteoarthritis, sleep apnea, essential tremor, cognitive impairment.  He was  hospitalized at [facility] 1/24-1/27/2022 after a fall at home. CXR showed acute appearing fractures of the left lateral ribs 7-9, chronic rib deformity of right 6th rib and suspected small left pneumothorax. He was noted to have a reddish stool in the ED and guaiac was positive. Hgb remained stable and no s/s of GI bleed. Pacemaker interrogation showed normal function. He discharged 1/27/2021 to [facility]. Vitamin B12, folic acid and thiamine were started. Vitamin D was also started.  At tcu discharge, he was ambulating 300 ft with walker and supervision. Required supervision to stand by assist with cares. BIMS 14/15, SBT 2/28, MOCA 16/30.    Today's concern is:      Acute pain of right knee  Hx of fracture of patella  Primary osteoarthritis involving multiple joints  Chronic a-fib (H)  Cardiac pacemaker in situ  Cardiomyopathy, unspecified type (H)  Coronary artery disease involving native heart without angina pectoris, unspecified vessel or lesion type  Laceration of left ring finger without foreign body without damage to nail, subsequent encounter  Primary hypertension  Orthostatic hypotension  Cognitive impairment  Physical deconditioning   He is a fair historian. Reports feeling well. He had his pacemaker generator replaced 10/17/2022 and reports mild tenderness at the site. No chest  "pain. Good appetite. Reports knee pain has resolved. Working with home therapies and feels stronger. Using a walker when out of his apartment.   He was seen in the ED 9/22/2022 after a fall that night when he tripped on the carpet. He had a skin tear of his left forearm and bumped his head. He declined CT head while in the ED. Neuro status has remained at baseline.       Allergies, and PMH/PSH reviewed in EPIC today    REVIEW OF SYSTEMS:  4 point ROS including Respiratory, CV, GI and , other than that noted in the HPI,  is negative    Objective:   Pulse 64   Temp 97.8  F (36.6  C)   Resp 18   Ht 1.854 m (6' 1\")   Wt 76.4 kg (168 lb 6.4 oz)   SpO2 96%   BMI 22.22 kg/m    GENERAL APPEARANCE:  Alert, in no distress    ENT:  [place], oropharynx clear  EYES:  conjunctiva and lids normal  NECK: no adenopathy or  thyromegaly  RESP:  lungs clear to auscultation   CV:  irregular  rhythm, no murmur, no LE edema   ABDOMEN:  soft, nontender, no distension, no  masses  M/S: in bed. WILKES with good strength. No edema, erythema or warmth of left knee, full ROM.   SKIN:  wound of left ring finger healed. Mild edema and erythema at pacemaker site, incision clean and intact.   PSYCH:  oriented X 2-3, insight and judgement impaired, memory impaired, mood normal       Recent labs in [facility] reviewed by me today.     ASSESSMENT / PLAN:  (M25.561) Acute pain of right knee  (primary encounter diagnosis)  (Z87.81) Hx of fracture of patella   (M15.9) Primary osteoarthritis involving multiple joints  Comment: acute pain resolved. Mobility has improved   Plan: continue tylenol. Continue home therapies     (I48.20) Chronic a-fib (H)  (Z95.0) Cardiac pacemaker in situ  History of complete heart block   Comment: rate controlled: 60-75. Pacemaker generator was replaced 10/17/2022. Mild edema and erythema at site, no signs of infection.   Plan: continue metoprolol. Closely monitor site. Follow up [facility] as scheduled.     (I42.9) " Cardiomyopathy, unspecified type (H)  (I25.10) Coronary artery disease involving native heart without angina pectoris, unspecified vessel or lesion type  Comment: no chest pain or acute symptoms   Plan: continue metoprolol     (S61.215D) Laceration of left ring finger without foreign body without damage to nail, subsequent encounter  Comment: healed   Plan: monitor prn     (I10) Primary hypertension  (I95.1) Orthostatic hypotension  Comment: recent BPs 120/63, 101/70, 140/80    Sodium chloride tabs dc'd 9/2022  Plan: continue metoprolol and midodrine. Encourage walker.     (R41.89) Cognitive impairment  Comment: mild deficits, though has episodes of increased confusion.   Plan: [facility] staff assistance with cares, meals, activities, med admin.     (R53.81) Physical deconditioning  Comment: progressing in therapies. No recent falls.   Plan: continue home PHYSICAL THERAPY/OT. Walker for mobility             Electronically signed by: MAGNOLIA Alanis CNP

## 2022-10-20 NOTE — LETTER
10/20/2022        RE: Jc Cloud  [facility]  [address]  [facility] [address]        [facility] GERIATRICS    Chief Complaint   Patient presents with     RECHECK     HPI:  Jc Cloud is a 89 year old  (3/25/1931), who is being seen today for an episodic care visit at: THE [facility] ([facility]) [[id number]].   He moved to this facility 2/14/2022 for a higher level of care than could be managed at home. Medical history significant for CAD s/p CABG, afib, cardiomyopathy, second degree AV block, biventricular cardiac pacemaker, HTN, orthostatic hypotension, etoh abuse, neuropathy, depression, anxiety, insomnia, osteoarthritis, sleep apnea, essential tremor, cognitive impairment.  He was  hospitalized at [facility] 1/24-1/27/2022 after a fall at home. CXR showed acute appearing fractures of the left lateral ribs 7-9, chronic rib deformity of right 6th rib and suspected small left pneumothorax. He was noted to have a reddish stool in the ED and guaiac was positive. Hgb remained stable and no s/s of GI bleed. Pacemaker interrogation showed normal function. He discharged 1/27/2021 to [facility] tcu. Vitamin B12, folic acid and thiamine were started. Vitamin D was also started.  At tcu discharge, he was ambulating 300 ft with walker and supervision. Required supervision to stand by assist with cares. BIMS 14/15, SBT 2/28, MOCA 16/30.    Today's concern is:      Acute pain of right knee  Hx of fracture of patella  Primary osteoarthritis involving multiple joints  Chronic a-fib (H)  Cardiac pacemaker in situ  Cardiomyopathy, unspecified type (H)  Coronary artery disease involving native heart without angina pectoris, unspecified vessel or lesion type  Laceration of left ring finger without foreign body without damage to nail, subsequent encounter  Primary hypertension  Orthostatic hypotension  Cognitive impairment  Physical deconditioning   He is a fair historian. Reports  "feeling well. He had his pacemaker generator replaced 10/17/2022 and reports mild tenderness at the site. No chest pain. Good appetite. Reports knee pain has resolved. Working with home therapies and feels stronger. Using a walker when out of his apartment.   He was seen in the ED 9/22/2022 after a fall that night when he tripped on the carpet. He had a skin tear of his left forearm and bumped his head. He declined CT head while in the ED. Neuro status has remained at baseline.       Allergies, and PMH/PSH reviewed in EPIC today    REVIEW OF SYSTEMS:  4 point ROS including Respiratory, CV, GI and , other than that noted in the HPI,  is negative    Objective:   Pulse 64   Temp 97.8  F (36.6  C)   Resp 18   Ht 1.854 m (6' 1\")   Wt 76.4 kg (168 lb 6.4 oz)   SpO2 96%   BMI 22.22 kg/m    GENERAL APPEARANCE:  Alert, in no distress    ENT:  [place], oropharynx clear  EYES:  conjunctiva and lids normal  NECK: no adenopathy or  thyromegaly  RESP:  lungs clear to auscultation   CV:  irregular  rhythm, no murmur, no LE edema   ABDOMEN:  soft, nontender, no distension, no  masses  M/S: in bed. WILKES with good strength. No edema, erythema or warmth of left knee, full ROM.   SKIN:  wound of left ring finger healed. Mild edema and erythema at pacemaker site, incision clean and intact.   PSYCH:  oriented X 2-3, insight and judgement impaired, memory impaired, mood normal       Recent labs in [facility] reviewed by me today.     ASSESSMENT / PLAN:  (M25.561) Acute pain of right knee  (primary encounter diagnosis)  (Z87.81) Hx of fracture of patella   (M15.9) Primary osteoarthritis involving multiple joints  Comment: acute pain resolved. Mobility has improved   Plan: continue tylenol. Continue home therapies     (I48.20) Chronic a-fib (H)  (Z95.0) Cardiac pacemaker in situ  History of complete heart block   Comment: rate controlled: 60-75. Pacemaker generator was replaced 10/17/2022. Mild edema and erythema at site, no signs of " infection.   Plan: continue metoprolol. Closely monitor site. Follow up [facility] as scheduled.     (I42.9) Cardiomyopathy, unspecified type (H)  (I25.10) Coronary artery disease involving native heart without angina pectoris, unspecified vessel or lesion type  Comment: no chest pain or acute symptoms   Plan: continue metoprolol     (S61.215D) Laceration of left ring finger without foreign body without damage to nail, subsequent encounter  Comment: healed   Plan: monitor prn     (I10) Primary hypertension  (I95.1) Orthostatic hypotension  Comment: recent BPs 120/63, 101/70, 140/80    Sodium chloride tabs dc'd 9/2022  Plan: continue metoprolol and midodrine. Encourage walker.     (R41.89) Cognitive impairment  Comment: mild deficits, though has episodes of increased confusion.   Plan: AL staff assistance with cares, meals, activities, med admin.     (R53.81) Physical deconditioning  Comment: progressing in therapies. No recent falls.   Plan: continue home PHYSICAL THERAPY/OT. Walker for mobility             Electronically signed by: MAGNOLIA Alanis CNP           Sincerely,        MAGNOLIA Alanis CNP

## 2022-10-28 VITALS
OXYGEN SATURATION: 96 % | RESPIRATION RATE: 18 BRPM | HEIGHT: 73 IN | WEIGHT: 168.4 LBS | HEART RATE: 64 BPM | BODY MASS INDEX: 22.32 KG/M2 | TEMPERATURE: 97.8 F

## 2022-10-28 PROBLEM — I42.9 CARDIOMYOPATHY, UNSPECIFIED TYPE (H): Status: ACTIVE | Noted: 2022-10-28

## 2022-12-05 ENCOUNTER — TELEPHONE (OUTPATIENT)
Dept: DERMATOLOGY | Facility: CLINIC | Age: 87
End: 2022-12-05

## 2022-12-05 NOTE — TELEPHONE ENCOUNTER
[facility] Call Center    Phone Message    May a detailed message be left on voicemail: yes     Reason for Call: Symptoms or Concerns     If patient has red-flag symptoms, warm transfer to triage line    Current symptom or concern: side of pt's face near temple area. Pt has had a growth for over a year. Now recently it has grown in size. It is more raised and wider. Now is bleeding as well. Pt does not normally complain and is telling his kids this is bleeding.      Symptoms have been present for:  1-2 week(s)    Has patient previously been seen for this? No    By : NA    Date: NA    Are there any new or worsening symptoms? Yes: Please call pt's son Kan back to discuss. Pt will see any provider as long as soon as possible. Thanks       Action Taken: Message routed to:  [facility] ([facility] Derm    Travel Screening: Not Applicable

## 2022-12-07 NOTE — TELEPHONE ENCOUNTER
Called and spoke to  patient's son/health care agent: Kan for more information on . Identified patient by  and last name.  Advised health care agent that due the patient having never been seen before in clinic prior and that with no previous provider the earliest we could schedule to see him would be in May. Healthcare agent declined any appointment scheduling and was advised to contact other dermatology clinics/providers for a potentially to be seen earlier.

## 2022-12-08 ENCOUNTER — APPOINTMENT (RX ONLY)
Dept: URBAN - METROPOLITAN AREA CLINIC 146 | Facility: CLINIC | Age: 87
Setting detail: DERMATOLOGY
End: 2022-12-08

## 2022-12-08 DIAGNOSIS — D18.0 HEMANGIOMA: ICD-10-CM

## 2022-12-08 DIAGNOSIS — L81.4 OTHER MELANIN HYPERPIGMENTATION: ICD-10-CM

## 2022-12-08 DIAGNOSIS — Z12.83 ENCOUNTER FOR SCREENING FOR MALIGNANT NEOPLASM OF SKIN: ICD-10-CM

## 2022-12-08 DIAGNOSIS — L82.1 OTHER SEBORRHEIC KERATOSIS: ICD-10-CM

## 2022-12-08 DIAGNOSIS — L57.0 ACTINIC KERATOSIS: ICD-10-CM

## 2022-12-08 PROBLEM — D18.01 HEMANGIOMA OF SKIN AND SUBCUTANEOUS TISSUE: Status: ACTIVE | Noted: 2022-12-08

## 2022-12-08 PROCEDURE — ? PRESCRIPTION MEDICATION MANAGEMENT

## 2022-12-08 PROCEDURE — 99213 OFFICE O/P EST LOW 20 MIN: CPT | Mod: 25

## 2022-12-08 PROCEDURE — 17003 DESTRUCT PREMALG LES 2-14: CPT

## 2022-12-08 PROCEDURE — ? LIQUID NITROGEN

## 2022-12-08 PROCEDURE — ? PRESCRIPTION

## 2022-12-08 PROCEDURE — 17000 DESTRUCT PREMALG LESION: CPT

## 2022-12-08 PROCEDURE — ? COUNSELING

## 2022-12-08 PROCEDURE — ? RECOMMENDATIONS

## 2022-12-08 RX ORDER — FLUOROURACIL 5 MG/G
CREAM TOPICAL QHS
Qty: 40 | Refills: 1

## 2022-12-08 ASSESSMENT — LOCATION SIMPLE DESCRIPTION DERM
LOCATION SIMPLE: LEFT SHOULDER
LOCATION SIMPLE: RIGHT FOREARM
LOCATION SIMPLE: CHEST
LOCATION SIMPLE: ABDOMEN
LOCATION SIMPLE: LEFT UPPER BACK
LOCATION SIMPLE: LEFT EAR
LOCATION SIMPLE: RIGHT UPPER BACK
LOCATION SIMPLE: RIGHT EAR
LOCATION SIMPLE: POSTERIOR NECK
LOCATION SIMPLE: RIGHT ANTERIOR NECK
LOCATION SIMPLE: RIGHT EYEBROW
LOCATION SIMPLE: LEFT CHEEK
LOCATION SIMPLE: LEFT FOREARM
LOCATION SIMPLE: LEFT ANTERIOR NECK
LOCATION SIMPLE: RIGHT CHEEK
LOCATION SIMPLE: RIGHT SHOULDER
LOCATION SIMPLE: RIGHT UPPER ARM

## 2022-12-08 ASSESSMENT — LOCATION DETAILED DESCRIPTION DERM
LOCATION DETAILED: LEFT INFERIOR UPPER BACK
LOCATION DETAILED: RIGHT MID-UPPER BACK
LOCATION DETAILED: EPIGASTRIC SKIN
LOCATION DETAILED: LEFT SUPERIOR CRUS OF ANTIHELIX
LOCATION DETAILED: RIGHT SUPERIOR UPPER BACK
LOCATION DETAILED: XIPHOID
LOCATION DETAILED: RIGHT CLAVICULAR NECK
LOCATION DETAILED: LEFT POSTERIOR SHOULDER
LOCATION DETAILED: LEFT CLAVICULAR NECK
LOCATION DETAILED: RIGHT DISTAL DORSAL FOREARM
LOCATION DETAILED: LEFT DISTAL DORSAL FOREARM
LOCATION DETAILED: RIGHT LATERAL TRAPEZIAL NECK
LOCATION DETAILED: LEFT CENTRAL MALAR CHEEK
LOCATION DETAILED: RIGHT CENTRAL MALAR CHEEK
LOCATION DETAILED: LEFT INFERIOR POSTERIOR NECK
LOCATION DETAILED: LEFT SUPERIOR UPPER BACK
LOCATION DETAILED: RIGHT POSTERIOR SHOULDER
LOCATION DETAILED: LEFT SUPERIOR HELIX
LOCATION DETAILED: RIGHT CENTRAL EYEBROW
LOCATION DETAILED: RIGHT ANTIHELIX
LOCATION DETAILED: LEFT MID-UPPER BACK
LOCATION DETAILED: RIGHT INFERIOR UPPER BACK
LOCATION DETAILED: LEFT LATERAL TRAPEZIAL NECK
LOCATION DETAILED: RIGHT ANTERIOR PROXIMAL UPPER ARM
LOCATION DETAILED: RIGHT DISTAL POSTERIOR UPPER ARM
LOCATION DETAILED: LEFT MEDIAL SUPERIOR CHEST

## 2022-12-08 ASSESSMENT — LOCATION ZONE DERM
LOCATION ZONE: ARM
LOCATION ZONE: EAR
LOCATION ZONE: TRUNK
LOCATION ZONE: FACE
LOCATION ZONE: NECK

## 2022-12-08 NOTE — PROCEDURE: PRESCRIPTION MEDICATION MANAGEMENT
Detail Level: Simple
Render In Strict Bullet Format?: No
Initiate Treatment: Fluorouracil Cream- apply nightly x 2 weeks

## 2022-12-08 NOTE — PROCEDURE: LIQUID NITROGEN
Render Note In Bullet Format When Appropriate: No
Consent: The patient's consent was obtained including but not limited to risks of crusting, scabbing, blistering, scarring, darker or lighter pigmentary change, recurrence, incomplete removal and infection.
Show Applicator Variable?: Yes
Number Of Freeze-Thaw Cycles: 1 freeze-thaw cycle
Detail Level: Zone
Post-Care Instructions: I reviewed with the patient in detail post-care instructions. Patient is to wear sunprotection, and avoid picking at any of the treated lesions. Pt may apply Vaseline to crusted or scabbing areas.
Duration Of Freeze Thaw-Cycle (Seconds): 0

## 2023-01-01 ENCOUNTER — APPOINTMENT (OUTPATIENT)
Dept: PHYSICAL THERAPY | Facility: CLINIC | Age: 88
DRG: 871 | End: 2023-01-01
Payer: MEDICARE

## 2023-01-01 ENCOUNTER — APPOINTMENT (OUTPATIENT)
Dept: GENERAL RADIOLOGY | Facility: CLINIC | Age: 88
DRG: 871 | End: 2023-01-01
Attending: FAMILY MEDICINE
Payer: MEDICARE

## 2023-01-01 ENCOUNTER — HOSPITAL ENCOUNTER (INPATIENT)
Facility: CLINIC | Age: 88
LOS: 13 days | DRG: 871 | End: 2023-12-28
Attending: FAMILY MEDICINE | Admitting: STUDENT IN AN ORGANIZED HEALTH CARE EDUCATION/TRAINING PROGRAM
Payer: MEDICARE

## 2023-01-01 ENCOUNTER — APPOINTMENT (OUTPATIENT)
Dept: SPEECH THERAPY | Facility: CLINIC | Age: 88
DRG: 871 | End: 2023-01-01
Attending: FAMILY MEDICINE
Payer: MEDICARE

## 2023-01-01 ENCOUNTER — ASSISTED LIVING VISIT (OUTPATIENT)
Dept: GERIATRICS | Facility: CLINIC | Age: 88
End: 2023-01-01
Payer: MEDICARE

## 2023-01-01 ENCOUNTER — APPOINTMENT (OUTPATIENT)
Dept: SPEECH THERAPY | Facility: CLINIC | Age: 88
DRG: 871 | End: 2023-01-01
Payer: MEDICARE

## 2023-01-01 ENCOUNTER — APPOINTMENT (OUTPATIENT)
Dept: GENERAL RADIOLOGY | Facility: CLINIC | Age: 88
DRG: 871 | End: 2023-01-01
Attending: STUDENT IN AN ORGANIZED HEALTH CARE EDUCATION/TRAINING PROGRAM
Payer: MEDICARE

## 2023-01-01 ENCOUNTER — APPOINTMENT (OUTPATIENT)
Dept: CARDIOLOGY | Facility: CLINIC | Age: 88
DRG: 871 | End: 2023-01-01
Attending: FAMILY MEDICINE
Payer: MEDICARE

## 2023-01-01 ENCOUNTER — LAB REQUISITION (OUTPATIENT)
Dept: LAB | Facility: CLINIC | Age: 88
End: 2023-01-01
Payer: MEDICARE

## 2023-01-01 ENCOUNTER — APPOINTMENT (OUTPATIENT)
Dept: GENERAL RADIOLOGY | Facility: CLINIC | Age: 88
DRG: 871 | End: 2023-01-01
Attending: PHYSICIAN ASSISTANT
Payer: MEDICARE

## 2023-01-01 ENCOUNTER — APPOINTMENT (OUTPATIENT)
Dept: OCCUPATIONAL THERAPY | Facility: CLINIC | Age: 88
DRG: 871 | End: 2023-01-01
Payer: MEDICARE

## 2023-01-01 ENCOUNTER — APPOINTMENT (OUTPATIENT)
Dept: GENERAL RADIOLOGY | Facility: CLINIC | Age: 88
DRG: 871 | End: 2023-01-01
Payer: MEDICARE

## 2023-01-01 ENCOUNTER — APPOINTMENT (OUTPATIENT)
Dept: SPEECH THERAPY | Facility: CLINIC | Age: 88
DRG: 871 | End: 2023-01-01
Attending: PHYSICIAN ASSISTANT
Payer: MEDICARE

## 2023-01-01 ENCOUNTER — APPOINTMENT (OUTPATIENT)
Dept: OCCUPATIONAL THERAPY | Facility: CLINIC | Age: 88
DRG: 871 | End: 2023-01-01
Attending: STUDENT IN AN ORGANIZED HEALTH CARE EDUCATION/TRAINING PROGRAM
Payer: MEDICARE

## 2023-01-01 ENCOUNTER — APPOINTMENT (OUTPATIENT)
Dept: ULTRASOUND IMAGING | Facility: CLINIC | Age: 88
DRG: 871 | End: 2023-01-01
Attending: PHYSICIAN ASSISTANT
Payer: MEDICARE

## 2023-01-01 ENCOUNTER — APPOINTMENT (OUTPATIENT)
Dept: CT IMAGING | Facility: CLINIC | Age: 88
DRG: 871 | End: 2023-01-01
Attending: FAMILY MEDICINE
Payer: MEDICARE

## 2023-01-01 ENCOUNTER — APPOINTMENT (OUTPATIENT)
Dept: PHYSICAL THERAPY | Facility: CLINIC | Age: 88
DRG: 871 | End: 2023-01-01
Attending: FAMILY MEDICINE
Payer: MEDICARE

## 2023-01-01 ENCOUNTER — APPOINTMENT (OUTPATIENT)
Dept: OCCUPATIONAL THERAPY | Facility: CLINIC | Age: 88
DRG: 871 | End: 2023-01-01
Attending: FAMILY MEDICINE
Payer: MEDICARE

## 2023-01-01 VITALS
DIASTOLIC BLOOD PRESSURE: 79 MMHG | HEART RATE: 86 BPM | SYSTOLIC BLOOD PRESSURE: 113 MMHG | WEIGHT: 170.8 LBS | RESPIRATION RATE: 17 BRPM | TEMPERATURE: 97.6 F | BODY MASS INDEX: 22.53 KG/M2

## 2023-01-01 VITALS
RESPIRATION RATE: 21 BRPM | TEMPERATURE: 97.1 F | WEIGHT: 170.4 LBS | HEART RATE: 69 BPM | DIASTOLIC BLOOD PRESSURE: 73 MMHG | SYSTOLIC BLOOD PRESSURE: 112 MMHG | BODY MASS INDEX: 22.48 KG/M2

## 2023-01-01 VITALS
BODY MASS INDEX: 22.37 KG/M2 | WEIGHT: 168.8 LBS | OXYGEN SATURATION: 92 % | HEIGHT: 73 IN | SYSTOLIC BLOOD PRESSURE: 68 MMHG | HEART RATE: 72 BPM | RESPIRATION RATE: 14 BRPM | TEMPERATURE: 98.2 F | DIASTOLIC BLOOD PRESSURE: 46 MMHG

## 2023-01-01 VITALS
DIASTOLIC BLOOD PRESSURE: 77 MMHG | HEART RATE: 95 BPM | TEMPERATURE: 98 F | WEIGHT: 173 LBS | SYSTOLIC BLOOD PRESSURE: 107 MMHG | BODY MASS INDEX: 22.82 KG/M2 | RESPIRATION RATE: 18 BRPM

## 2023-01-01 VITALS
DIASTOLIC BLOOD PRESSURE: 80 MMHG | RESPIRATION RATE: 18 BRPM | OXYGEN SATURATION: 93 % | TEMPERATURE: 98 F | HEART RATE: 59 BPM | SYSTOLIC BLOOD PRESSURE: 120 MMHG

## 2023-01-01 VITALS
TEMPERATURE: 98 F | SYSTOLIC BLOOD PRESSURE: 122 MMHG | RESPIRATION RATE: 18 BRPM | HEART RATE: 77 BPM | WEIGHT: 173 LBS | OXYGEN SATURATION: 92 % | BODY MASS INDEX: 22.82 KG/M2 | DIASTOLIC BLOOD PRESSURE: 80 MMHG

## 2023-01-01 DIAGNOSIS — R41.89 COGNITIVE IMPAIRMENT: ICD-10-CM

## 2023-01-01 DIAGNOSIS — R09.02 HYPOXIA: ICD-10-CM

## 2023-01-01 DIAGNOSIS — I42.9 CARDIOMYOPATHY, UNSPECIFIED TYPE (H): ICD-10-CM

## 2023-01-01 DIAGNOSIS — I10 PRIMARY HYPERTENSION: ICD-10-CM

## 2023-01-01 DIAGNOSIS — I48.20 CHRONIC A-FIB (H): ICD-10-CM

## 2023-01-01 DIAGNOSIS — I48.20 CHRONIC A-FIB (H): Primary | ICD-10-CM

## 2023-01-01 DIAGNOSIS — F32.A DEPRESSION, UNSPECIFIED DEPRESSION TYPE: ICD-10-CM

## 2023-01-01 DIAGNOSIS — Z95.0 CARDIAC PACEMAKER IN SITU: Primary | ICD-10-CM
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
## 2023-01-01 DIAGNOSIS — I95.1 ORTHOSTATIC HYPOTENSION: ICD-10-CM

## 2023-01-01 DIAGNOSIS — J69.0 ASPIRATION PNEUMONIA OF RIGHT LOWER LOBE, UNSPECIFIED ASPIRATION PNEUMONIA TYPE (H): ICD-10-CM

## 2023-01-01 DIAGNOSIS — Z95.0 CARDIAC PACEMAKER IN SITU: ICD-10-CM

## 2023-01-01 DIAGNOSIS — R52 PAIN: ICD-10-CM

## 2023-01-01 DIAGNOSIS — N18.31 STAGE 3A CHRONIC KIDNEY DISEASE (H): ICD-10-CM

## 2023-01-01 DIAGNOSIS — F10.10 ALCOHOL ABUSE: ICD-10-CM

## 2023-01-01 DIAGNOSIS — G60.9 IDIOPATHIC PERIPHERAL NEUROPATHY: ICD-10-CM

## 2023-01-01 DIAGNOSIS — N39.0 ACUTE LOWER UTI: ICD-10-CM

## 2023-01-01 DIAGNOSIS — I25.10 CORONARY ARTERY DISEASE INVOLVING NATIVE HEART WITHOUT ANGINA PECTORIS, UNSPECIFIED VESSEL OR LESION TYPE: ICD-10-CM

## 2023-01-01 DIAGNOSIS — F33.9 EPISODE OF RECURRENT MAJOR DEPRESSIVE DISORDER, UNSPECIFIED DEPRESSION EPISODE SEVERITY (H): Primary | ICD-10-CM

## 2023-01-01 DIAGNOSIS — R25.1 TREMOR: ICD-10-CM

## 2023-01-01 DIAGNOSIS — G47.00 INSOMNIA: Primary | ICD-10-CM

## 2023-01-01 DIAGNOSIS — R15.1 FECAL SMEARING: ICD-10-CM

## 2023-01-01 DIAGNOSIS — G25.0 ESSENTIAL TREMOR: ICD-10-CM

## 2023-01-01 DIAGNOSIS — R41.82 ALTERED MENTAL STATUS, UNSPECIFIED ALTERED MENTAL STATUS TYPE: ICD-10-CM

## 2023-01-01 DIAGNOSIS — Z86.59 HISTORY OF DEMENTIA: ICD-10-CM

## 2023-01-01 DIAGNOSIS — N39.42 INCONTINENCE WITHOUT SENSORY AWARENESS: ICD-10-CM

## 2023-01-01 DIAGNOSIS — F33.9 EPISODE OF RECURRENT MAJOR DEPRESSIVE DISORDER, UNSPECIFIED DEPRESSION EPISODE SEVERITY (H): ICD-10-CM

## 2023-01-01 DIAGNOSIS — I10 ESSENTIAL (PRIMARY) HYPERTENSION: ICD-10-CM

## 2023-01-01 DIAGNOSIS — Z79.01 ANTICOAGULATED: ICD-10-CM

## 2023-01-01 LAB
ALBUMIN MFR UR ELPH: 30 MG/DL
ALBUMIN MFR UR ELPH: 65.7 MG/DL
ALBUMIN SERPL BCG-MCNC: 3 G/DL (ref 3.5–5.2)
ALBUMIN SERPL BCG-MCNC: 3.1 G/DL (ref 3.5–5.2)
ALBUMIN SERPL BCG-MCNC: 3.2 G/DL (ref 3.5–5.2)
ALBUMIN SERPL BCG-MCNC: 3.3 G/DL (ref 3.5–5.2)
ALBUMIN SERPL BCG-MCNC: 3.4 G/DL (ref 3.5–5.2)
ALBUMIN UR-MCNC: 20 MG/DL
ALBUMIN UR-MCNC: 30 MG/DL
ALBUMIN UR-MCNC: 70 MG/DL
ALBUMIN UR-MCNC: NEGATIVE MG/DL
ALP SERPL-CCNC: 85 U/L (ref 40–150)
ALP SERPL-CCNC: 86 U/L (ref 40–150)
ALP SERPL-CCNC: 88 U/L (ref 40–150)
ALP SERPL-CCNC: 94 U/L (ref 40–150)
ALP SERPL-CCNC: 99 U/L (ref 40–150)
ALT SERPL W P-5'-P-CCNC: 14 U/L (ref 0–70)
ALT SERPL W P-5'-P-CCNC: 18 U/L (ref 0–70)
ALT SERPL W P-5'-P-CCNC: 18 U/L (ref 0–70)
ALT SERPL W P-5'-P-CCNC: 19 U/L (ref 0–70)
ALT SERPL W P-5'-P-CCNC: 20 U/L (ref 0–70)
AMMONIA PLAS-SCNC: 19 UMOL/L (ref 16–60)
ANION GAP SERPL CALCULATED.3IONS-SCNC: 11 MMOL/L (ref 7–15)
ANION GAP SERPL CALCULATED.3IONS-SCNC: 13 MMOL/L (ref 7–15)
ANION GAP SERPL CALCULATED.3IONS-SCNC: 14 MMOL/L (ref 7–15)
ANION GAP SERPL CALCULATED.3IONS-SCNC: 15 MMOL/L (ref 7–15)
ANION GAP SERPL CALCULATED.3IONS-SCNC: 16 MMOL/L (ref 7–15)
ANION GAP SERPL CALCULATED.3IONS-SCNC: 17 MMOL/L (ref 7–15)
ANION GAP SERPL CALCULATED.3IONS-SCNC: 18 MMOL/L (ref 7–15)
ANION GAP SERPL CALCULATED.3IONS-SCNC: 8 MMOL/L (ref 7–15)
ANION GAP SERPL CALCULATED.3IONS-SCNC: 9 MMOL/L (ref 7–15)
APPEARANCE UR: ABNORMAL
APPEARANCE UR: ABNORMAL
APPEARANCE UR: CLEAR
APPEARANCE UR: CLEAR
APTT PPP: 34 SECONDS (ref 22–38)
AST SERPL W P-5'-P-CCNC: 23 U/L (ref 0–45)
AST SERPL W P-5'-P-CCNC: 24 U/L (ref 0–45)
AST SERPL W P-5'-P-CCNC: 26 U/L (ref 0–45)
AST SERPL W P-5'-P-CCNC: 34 U/L (ref 0–45)
AST SERPL W P-5'-P-CCNC: 35 U/L (ref 0–45)
ATRIAL RATE - MUSE: 394 BPM
ATRIAL RATE - MUSE: 52 BPM
BACTERIA #/AREA URNS HPF: ABNORMAL /HPF
BACTERIA #/AREA URNS HPF: ABNORMAL /HPF
BACTERIA BLD CULT: NO GROWTH
BACTERIA UR CULT: NORMAL
BASE EXCESS BLDV CALC-SCNC: -1 MMOL/L (ref -7.7–1.9)
BASE EXCESS BLDV CALC-SCNC: -2 MMOL/L (ref -7.7–1.9)
BASE EXCESS BLDV CALC-SCNC: -2.6 MMOL/L (ref -7.7–1.9)
BASE EXCESS BLDV CALC-SCNC: -3.1 MMOL/L (ref -7.7–1.9)
BASE EXCESS BLDV CALC-SCNC: -4.1 MMOL/L (ref -7.7–1.9)
BASE EXCESS BLDV CALC-SCNC: -4.2 MMOL/L (ref -7.7–1.9)
BASOPHILS # BLD AUTO: 0 10E3/UL (ref 0–0.2)
BASOPHILS NFR BLD AUTO: 0 %
BILIRUB DIRECT SERPL-MCNC: 0.37 MG/DL (ref 0–0.3)
BILIRUB DIRECT SERPL-MCNC: <0.2 MG/DL (ref 0–0.3)
BILIRUB SERPL-MCNC: 0.4 MG/DL
BILIRUB SERPL-MCNC: 0.4 MG/DL
BILIRUB SERPL-MCNC: 0.5 MG/DL
BILIRUB SERPL-MCNC: 0.6 MG/DL
BILIRUB SERPL-MCNC: 0.7 MG/DL
BILIRUB UR QL STRIP: NEGATIVE
BUN SERPL-MCNC: 30 MG/DL (ref 8–23)
BUN SERPL-MCNC: 30.5 MG/DL (ref 8–23)
BUN SERPL-MCNC: 30.7 MG/DL (ref 8–23)
BUN SERPL-MCNC: 31.7 MG/DL (ref 8–23)
BUN SERPL-MCNC: 39.2 MG/DL (ref 8–23)
BUN SERPL-MCNC: 44.8 MG/DL (ref 8–23)
BUN SERPL-MCNC: 51.3 MG/DL (ref 8–23)
BUN SERPL-MCNC: 57.2 MG/DL (ref 8–23)
BUN SERPL-MCNC: 59.7 MG/DL (ref 8–23)
BUN SERPL-MCNC: 60.6 MG/DL (ref 8–23)
BUN SERPL-MCNC: 60.7 MG/DL (ref 8–23)
BUN SERPL-MCNC: 65.5 MG/DL (ref 8–23)
BUN SERPL-MCNC: 70.7 MG/DL (ref 8–23)
BUN SERPL-MCNC: 75 MG/DL (ref 8–23)
BUN SERPL-MCNC: 80.6 MG/DL (ref 8–23)
BUN SERPL-MCNC: 82.7 MG/DL (ref 8–23)
BUN SERPL-MCNC: 85.5 MG/DL (ref 8–23)
BUN SERPL-MCNC: 86.6 MG/DL (ref 8–23)
BUN SERPL-MCNC: 87.7 MG/DL (ref 8–23)
CALCIUM SERPL-MCNC: 7.3 MG/DL (ref 8.2–9.6)
CALCIUM SERPL-MCNC: 7.4 MG/DL (ref 8.2–9.6)
CALCIUM SERPL-MCNC: 7.5 MG/DL (ref 8.2–9.6)
CALCIUM SERPL-MCNC: 7.6 MG/DL (ref 8.2–9.6)
CALCIUM SERPL-MCNC: 7.7 MG/DL (ref 8.2–9.6)
CALCIUM SERPL-MCNC: 7.8 MG/DL (ref 8.2–9.6)
CALCIUM SERPL-MCNC: 8 MG/DL (ref 8.2–9.6)
CALCIUM SERPL-MCNC: 8.1 MG/DL (ref 8.2–9.6)
CALCIUM SERPL-MCNC: 8.1 MG/DL (ref 8.2–9.6)
CALCIUM SERPL-MCNC: 8.2 MG/DL (ref 8.2–9.6)
CALCIUM SERPL-MCNC: 8.2 MG/DL (ref 8.2–9.6)
CALCIUM SERPL-MCNC: 8.3 MG/DL (ref 8.2–9.6)
CALCIUM SERPL-MCNC: 8.4 MG/DL (ref 8.2–9.6)
CALCIUM SERPL-MCNC: 8.5 MG/DL (ref 8.2–9.6)
CALCIUM SERPL-MCNC: 8.6 MG/DL (ref 8.2–9.6)
CALCIUM SERPL-MCNC: 8.8 MG/DL (ref 8.2–9.6)
CHLORIDE SERPL-SCNC: 106 MMOL/L (ref 98–107)
CHLORIDE SERPL-SCNC: 108 MMOL/L (ref 98–107)
CHLORIDE SERPL-SCNC: 109 MMOL/L (ref 98–107)
CHLORIDE SERPL-SCNC: 110 MMOL/L (ref 98–107)
CHLORIDE SERPL-SCNC: 111 MMOL/L (ref 98–107)
CHLORIDE SERPL-SCNC: 112 MMOL/L (ref 98–107)
CHLORIDE SERPL-SCNC: 113 MMOL/L (ref 98–107)
CHLORIDE SERPL-SCNC: 114 MMOL/L (ref 98–107)
CK SERPL-CCNC: 86 U/L (ref 39–308)
COLOR UR AUTO: ABNORMAL
COLOR UR AUTO: YELLOW
CREAT SERPL-MCNC: 1.46 MG/DL (ref 0.67–1.17)
CREAT SERPL-MCNC: 1.51 MG/DL (ref 0.67–1.17)
CREAT SERPL-MCNC: 1.51 MG/DL (ref 0.67–1.17)
CREAT SERPL-MCNC: 1.55 MG/DL (ref 0.67–1.17)
CREAT SERPL-MCNC: 1.69 MG/DL (ref 0.67–1.17)
CREAT SERPL-MCNC: 1.9 MG/DL (ref 0.67–1.17)
CREAT SERPL-MCNC: 2.4 MG/DL (ref 0.67–1.17)
CREAT SERPL-MCNC: 2.72 MG/DL (ref 0.67–1.17)
CREAT SERPL-MCNC: 2.98 MG/DL (ref 0.67–1.17)
CREAT SERPL-MCNC: 2.98 MG/DL (ref 0.67–1.17)
CREAT SERPL-MCNC: 3.64 MG/DL (ref 0.67–1.17)
CREAT SERPL-MCNC: 4.8 MG/DL (ref 0.67–1.17)
CREAT SERPL-MCNC: 5.55 MG/DL (ref 0.67–1.17)
CREAT SERPL-MCNC: 5.63 MG/DL (ref 0.67–1.17)
CREAT SERPL-MCNC: 6.08 MG/DL (ref 0.67–1.17)
CREAT SERPL-MCNC: 6.3 MG/DL (ref 0.67–1.17)
CREAT SERPL-MCNC: 6.66 MG/DL (ref 0.67–1.17)
CREAT SERPL-MCNC: 6.72 MG/DL (ref 0.67–1.17)
CREAT SERPL-MCNC: 6.78 MG/DL (ref 0.67–1.17)
CREAT UR-MCNC: 40.7 MG/DL
CREAT UR-MCNC: 85.9 MG/DL
CRP SERPL-MCNC: 42.2 MG/L
DEPRECATED HCO3 PLAS-SCNC: 15 MMOL/L (ref 22–29)
DEPRECATED HCO3 PLAS-SCNC: 16 MMOL/L (ref 22–29)
DEPRECATED HCO3 PLAS-SCNC: 17 MMOL/L (ref 22–29)
DEPRECATED HCO3 PLAS-SCNC: 18 MMOL/L (ref 22–29)
DEPRECATED HCO3 PLAS-SCNC: 20 MMOL/L (ref 22–29)
DEPRECATED HCO3 PLAS-SCNC: 21 MMOL/L (ref 22–29)
DEPRECATED HCO3 PLAS-SCNC: 21 MMOL/L (ref 22–29)
DEPRECATED HCO3 PLAS-SCNC: 23 MMOL/L (ref 22–29)
DEPRECATED HCO3 PLAS-SCNC: 23 MMOL/L (ref 22–29)
DEPRECATED HCO3 PLAS-SCNC: 24 MMOL/L (ref 22–29)
DEPRECATED HCO3 PLAS-SCNC: 24 MMOL/L (ref 22–29)
DEPRECATED HCO3 PLAS-SCNC: 25 MMOL/L (ref 22–29)
DIASTOLIC BLOOD PRESSURE - MUSE: NORMAL MMHG
DIASTOLIC BLOOD PRESSURE - MUSE: NORMAL MMHG
EGFRCR SERPLBLD CKD-EPI 2021: 11 ML/MIN/1.73M2
EGFRCR SERPLBLD CKD-EPI 2021: 15 ML/MIN/1.73M2
EGFRCR SERPLBLD CKD-EPI 2021: 19 ML/MIN/1.73M2
EGFRCR SERPLBLD CKD-EPI 2021: 19 ML/MIN/1.73M2
EGFRCR SERPLBLD CKD-EPI 2021: 21 ML/MIN/1.73M2
EGFRCR SERPLBLD CKD-EPI 2021: 25 ML/MIN/1.73M2
EGFRCR SERPLBLD CKD-EPI 2021: 33 ML/MIN/1.73M2
EGFRCR SERPLBLD CKD-EPI 2021: 38 ML/MIN/1.73M2
EGFRCR SERPLBLD CKD-EPI 2021: 43 ML/MIN/1.73M2
EGFRCR SERPLBLD CKD-EPI 2021: 45 ML/MIN/1.73M2
EGFRCR SERPLBLD CKD-EPI 2021: 7 ML/MIN/1.73M2
EGFRCR SERPLBLD CKD-EPI 2021: 8 ML/MIN/1.73M2
EGFRCR SERPLBLD CKD-EPI 2021: 8 ML/MIN/1.73M2
EGFRCR SERPLBLD CKD-EPI 2021: 9 ML/MIN/1.73M2
EGFRCR SERPLBLD CKD-EPI 2021: 9 ML/MIN/1.73M2
EOSINOPHIL # BLD AUTO: 0 10E3/UL (ref 0–0.7)
EOSINOPHIL # BLD AUTO: 0 10E3/UL (ref 0–0.7)
EOSINOPHIL # BLD AUTO: 0.4 10E3/UL (ref 0–0.7)
EOSINOPHIL # BLD AUTO: 0.4 10E3/UL (ref 0–0.7)
EOSINOPHIL NFR BLD AUTO: 0 %
EOSINOPHIL NFR BLD AUTO: 0 %
EOSINOPHIL NFR BLD AUTO: 3 %
EOSINOPHIL NFR BLD AUTO: 4 %
ERYTHROCYTE [DISTWIDTH] IN BLOOD BY AUTOMATED COUNT: 13 % (ref 10–15)
ERYTHROCYTE [DISTWIDTH] IN BLOOD BY AUTOMATED COUNT: 13.1 % (ref 10–15)
ERYTHROCYTE [DISTWIDTH] IN BLOOD BY AUTOMATED COUNT: 13.2 % (ref 10–15)
ERYTHROCYTE [DISTWIDTH] IN BLOOD BY AUTOMATED COUNT: 13.2 % (ref 10–15)
ERYTHROCYTE [DISTWIDTH] IN BLOOD BY AUTOMATED COUNT: 13.3 % (ref 10–15)
ERYTHROCYTE [DISTWIDTH] IN BLOOD BY AUTOMATED COUNT: 13.4 % (ref 10–15)
ERYTHROCYTE [DISTWIDTH] IN BLOOD BY AUTOMATED COUNT: 13.5 % (ref 10–15)
ERYTHROCYTE [DISTWIDTH] IN BLOOD BY AUTOMATED COUNT: 13.6 % (ref 10–15)
ERYTHROCYTE [DISTWIDTH] IN BLOOD BY AUTOMATED COUNT: 13.7 % (ref 10–15)
FLUAV RNA SPEC QL NAA+PROBE: NEGATIVE
FLUBV RNA RESP QL NAA+PROBE: NEGATIVE
GFR SERPL CREATININE-BSD FRML MDRD: 42 ML/MIN/1.73M2
GFR SERPL CREATININE-BSD FRML MDRD: 43 ML/MIN/1.73M2
GLUCOSE BLDC GLUCOMTR-MCNC: 113 MG/DL (ref 70–99)
GLUCOSE BLDC GLUCOMTR-MCNC: 121 MG/DL (ref 70–99)
GLUCOSE SERPL-MCNC: 104 MG/DL (ref 70–99)
GLUCOSE SERPL-MCNC: 105 MG/DL (ref 70–99)
GLUCOSE SERPL-MCNC: 106 MG/DL (ref 70–99)
GLUCOSE SERPL-MCNC: 112 MG/DL (ref 70–99)
GLUCOSE SERPL-MCNC: 119 MG/DL (ref 70–99)
GLUCOSE SERPL-MCNC: 120 MG/DL (ref 70–99)
GLUCOSE SERPL-MCNC: 121 MG/DL (ref 70–99)
GLUCOSE SERPL-MCNC: 123 MG/DL (ref 70–99)
GLUCOSE SERPL-MCNC: 125 MG/DL (ref 70–99)
GLUCOSE SERPL-MCNC: 126 MG/DL (ref 70–99)
GLUCOSE SERPL-MCNC: 127 MG/DL (ref 70–99)
GLUCOSE SERPL-MCNC: 128 MG/DL (ref 70–99)
GLUCOSE SERPL-MCNC: 132 MG/DL (ref 70–99)
GLUCOSE SERPL-MCNC: 84 MG/DL (ref 70–99)
GLUCOSE SERPL-MCNC: 93 MG/DL (ref 70–99)
GLUCOSE SERPL-MCNC: 93 MG/DL (ref 70–99)
GLUCOSE SERPL-MCNC: 95 MG/DL (ref 70–99)
GLUCOSE SERPL-MCNC: 97 MG/DL (ref 70–99)
GLUCOSE SERPL-MCNC: 99 MG/DL (ref 70–99)
GLUCOSE UR STRIP-MCNC: 200 MG/DL
GLUCOSE UR STRIP-MCNC: NEGATIVE MG/DL
HCO3 BLDV-SCNC: 22 MMOL/L (ref 21–28)
HCO3 BLDV-SCNC: 23 MMOL/L (ref 21–28)
HCO3 BLDV-SCNC: 25 MMOL/L (ref 21–28)
HCO3 BLDV-SCNC: 26 MMOL/L (ref 21–28)
HCO3 BLDV-SCNC: 29 MMOL/L (ref 21–28)
HCT VFR BLD AUTO: 40.6 % (ref 40–53)
HCT VFR BLD AUTO: 41.4 % (ref 40–53)
HCT VFR BLD AUTO: 42.7 % (ref 40–53)
HCT VFR BLD AUTO: 42.7 % (ref 40–53)
HCT VFR BLD AUTO: 42.8 % (ref 40–53)
HCT VFR BLD AUTO: 43.1 % (ref 40–53)
HCT VFR BLD AUTO: 43.6 % (ref 40–53)
HCT VFR BLD AUTO: 44 % (ref 40–53)
HCT VFR BLD AUTO: 44.9 % (ref 40–53)
HCT VFR BLD AUTO: 46 % (ref 40–53)
HCT VFR BLD AUTO: 48.4 % (ref 40–53)
HCT VFR BLD AUTO: 51.6 % (ref 40–53)
HCT VFR BLD AUTO: 52.1 % (ref 40–53)
HGB BLD-MCNC: 12.9 G/DL (ref 13.3–17.7)
HGB BLD-MCNC: 13.2 G/DL (ref 13.3–17.7)
HGB BLD-MCNC: 13.6 G/DL (ref 13.3–17.7)
HGB BLD-MCNC: 13.6 G/DL (ref 13.3–17.7)
HGB BLD-MCNC: 13.8 G/DL (ref 13.3–17.7)
HGB BLD-MCNC: 13.9 G/DL (ref 13.3–17.7)
HGB BLD-MCNC: 14 G/DL (ref 13.3–17.7)
HGB BLD-MCNC: 14.6 G/DL (ref 13.3–17.7)
HGB BLD-MCNC: 14.7 G/DL (ref 13.3–17.7)
HGB BLD-MCNC: 14.7 G/DL (ref 13.3–17.7)
HGB BLD-MCNC: 14.9 G/DL (ref 13.3–17.7)
HGB BLD-MCNC: 15.9 G/DL (ref 13.3–17.7)
HGB BLD-MCNC: 16.4 G/DL (ref 13.3–17.7)
HGB UR QL STRIP: ABNORMAL
HGB UR QL STRIP: NEGATIVE
HOLD SPECIMEN: NORMAL
IMM GRANULOCYTES # BLD: 0.1 10E3/UL
IMM GRANULOCYTES # BLD: 0.2 10E3/UL
IMM GRANULOCYTES NFR BLD: 1 %
INR PPP: 1.13 (ref 0.85–1.15)
INTERPRETATION ECG - MUSE: NORMAL
INTERPRETATION ECG - MUSE: NORMAL
KETONES UR STRIP-MCNC: NEGATIVE MG/DL
L PNEUMO1 AG UR QL IA: NEGATIVE
LACTATE BLD-SCNC: 1.7 MMOL/L
LACTATE SERPL-SCNC: 1.6 MMOL/L (ref 0.7–2)
LACTATE SERPL-SCNC: 1.8 MMOL/L (ref 0.7–2)
LACTATE SERPL-SCNC: 1.8 MMOL/L (ref 0.7–2)
LACTATE SERPL-SCNC: 2.3 MMOL/L (ref 0.7–2)
LEUKOCYTE ESTERASE UR QL STRIP: ABNORMAL
LEUKOCYTE ESTERASE UR QL STRIP: ABNORMAL
LEUKOCYTE ESTERASE UR QL STRIP: NEGATIVE
LEUKOCYTE ESTERASE UR QL STRIP: NEGATIVE
LVEF ECHO: NORMAL
LYMPHOCYTES # BLD AUTO: 0.2 10E3/UL (ref 0.8–5.3)
LYMPHOCYTES # BLD AUTO: 0.4 10E3/UL (ref 0.8–5.3)
LYMPHOCYTES # BLD AUTO: 0.7 10E3/UL (ref 0.8–5.3)
LYMPHOCYTES # BLD AUTO: 0.7 10E3/UL (ref 0.8–5.3)
LYMPHOCYTES NFR BLD AUTO: 2 %
LYMPHOCYTES NFR BLD AUTO: 3 %
LYMPHOCYTES NFR BLD AUTO: 6 %
LYMPHOCYTES NFR BLD AUTO: 6 %
MAGNESIUM SERPL-MCNC: 1.9 MG/DL (ref 1.7–2.3)
MAGNESIUM SERPL-MCNC: 2 MG/DL (ref 1.7–2.3)
MAGNESIUM SERPL-MCNC: 2.1 MG/DL (ref 1.7–2.3)
MAGNESIUM SERPL-MCNC: 2.2 MG/DL (ref 1.7–2.3)
MAGNESIUM SERPL-MCNC: 2.2 MG/DL (ref 1.7–2.3)
MAGNESIUM SERPL-MCNC: 2.3 MG/DL (ref 1.7–2.3)
MCH RBC QN AUTO: 33.9 PG (ref 26.5–33)
MCH RBC QN AUTO: 34 PG (ref 26.5–33)
MCH RBC QN AUTO: 34.1 PG (ref 26.5–33)
MCH RBC QN AUTO: 34.1 PG (ref 26.5–33)
MCH RBC QN AUTO: 34.2 PG (ref 26.5–33)
MCH RBC QN AUTO: 34.3 PG (ref 26.5–33)
MCH RBC QN AUTO: 34.6 PG (ref 26.5–33)
MCH RBC QN AUTO: 34.8 PG (ref 26.5–33)
MCH RBC QN AUTO: 34.9 PG (ref 26.5–33)
MCH RBC QN AUTO: 34.9 PG (ref 26.5–33)
MCH RBC QN AUTO: 35.4 PG (ref 26.5–33)
MCHC RBC AUTO-ENTMCNC: 30.5 G/DL (ref 31.5–36.5)
MCHC RBC AUTO-ENTMCNC: 30.8 G/DL (ref 31.5–36.5)
MCHC RBC AUTO-ENTMCNC: 30.9 G/DL (ref 31.5–36.5)
MCHC RBC AUTO-ENTMCNC: 31.6 G/DL (ref 31.5–36.5)
MCHC RBC AUTO-ENTMCNC: 31.7 G/DL (ref 31.5–36.5)
MCHC RBC AUTO-ENTMCNC: 31.8 G/DL (ref 31.5–36.5)
MCHC RBC AUTO-ENTMCNC: 31.8 G/DL (ref 31.5–36.5)
MCHC RBC AUTO-ENTMCNC: 32 G/DL (ref 31.5–36.5)
MCHC RBC AUTO-ENTMCNC: 32.5 G/DL (ref 31.5–36.5)
MCHC RBC AUTO-ENTMCNC: 32.5 G/DL (ref 31.5–36.5)
MCHC RBC AUTO-ENTMCNC: 32.8 G/DL (ref 31.5–36.5)
MCHC RBC AUTO-ENTMCNC: 32.9 G/DL (ref 31.5–36.5)
MCHC RBC AUTO-ENTMCNC: 33.4 G/DL (ref 31.5–36.5)
MCV RBC AUTO: 104 FL (ref 78–100)
MCV RBC AUTO: 105 FL (ref 78–100)
MCV RBC AUTO: 107 FL (ref 78–100)
MCV RBC AUTO: 108 FL (ref 78–100)
MCV RBC AUTO: 109 FL (ref 78–100)
MCV RBC AUTO: 109 FL (ref 78–100)
MCV RBC AUTO: 110 FL (ref 78–100)
MCV RBC AUTO: 110 FL (ref 78–100)
MCV RBC AUTO: 111 FL (ref 78–100)
MCV RBC AUTO: 116 FL (ref 78–100)
MONOCYTES # BLD AUTO: 0.6 10E3/UL (ref 0–1.3)
MONOCYTES # BLD AUTO: 1 10E3/UL (ref 0–1.3)
MONOCYTES # BLD AUTO: 1.1 10E3/UL (ref 0–1.3)
MONOCYTES # BLD AUTO: 1.6 10E3/UL (ref 0–1.3)
MONOCYTES NFR BLD AUTO: 11 %
MONOCYTES NFR BLD AUTO: 7 %
MONOCYTES NFR BLD AUTO: 8 %
MONOCYTES NFR BLD AUTO: 8 %
MRSA DNA SPEC QL NAA+PROBE: NEGATIVE
MUCOUS THREADS #/AREA URNS LPF: PRESENT /LPF
MUCOUS THREADS #/AREA URNS LPF: PRESENT /LPF
NEUTROPHILS # BLD AUTO: 18.4 10E3/UL (ref 1.6–8.3)
NEUTROPHILS # BLD AUTO: 7.2 10E3/UL (ref 1.6–8.3)
NEUTROPHILS # BLD AUTO: 8.3 10E3/UL (ref 1.6–8.3)
NEUTROPHILS # BLD AUTO: 9.5 10E3/UL (ref 1.6–8.3)
NEUTROPHILS NFR BLD AUTO: 78 %
NEUTROPHILS NFR BLD AUTO: 82 %
NEUTROPHILS NFR BLD AUTO: 89 %
NEUTROPHILS NFR BLD AUTO: 89 %
NITRATE UR QL: NEGATIVE
NRBC # BLD AUTO: 0 10E3/UL
NRBC BLD AUTO-RTO: 0 /100
NT-PROBNP SERPL-MCNC: 4428 PG/ML (ref 0–1800)
O2/TOTAL GAS SETTING VFR VENT: 15 %
O2/TOTAL GAS SETTING VFR VENT: 30 %
O2/TOTAL GAS SETTING VFR VENT: 40 %
P AXIS - MUSE: NORMAL DEGREES
P AXIS - MUSE: NORMAL DEGREES
PCO2 BLDV: 36 MM HG (ref 40–50)
PCO2 BLDV: 37 MM HG (ref 40–50)
PCO2 BLDV: 38 MM HG (ref 40–50)
PCO2 BLDV: 51 MM HG (ref 40–50)
PCO2 BLDV: 53 MM HG (ref 40–50)
PCO2 BLDV: 62 MM HG (ref 40–50)
PCO2 BLDV: 62 MM HG (ref 40–50)
PH BLDV: 7.22 [PH] (ref 7.32–7.43)
PH BLDV: 7.23 [PH] (ref 7.32–7.43)
PH BLDV: 7.27 [PH] (ref 7.32–7.43)
PH BLDV: 7.34 [PH] (ref 7.32–7.43)
PH BLDV: 7.39 [PH] (ref 7.32–7.43)
PH BLDV: 7.39 [PH] (ref 7.32–7.43)
PH BLDV: 7.41 [PH] (ref 7.32–7.43)
PH UR STRIP: 5 [PH] (ref 5–7)
PH UR STRIP: 5 [PH] (ref 5–7)
PH UR STRIP: 5.5 [PH] (ref 5–7)
PH UR STRIP: 6 [PH] (ref 5–7)
PHOSPHATE SERPL-MCNC: 2.5 MG/DL (ref 2.5–4.5)
PHOSPHATE SERPL-MCNC: 3.4 MG/DL (ref 2.5–4.5)
PHOSPHATE SERPL-MCNC: 6.7 MG/DL (ref 2.5–4.5)
PHOSPHATE SERPL-MCNC: 7.4 MG/DL (ref 2.5–4.5)
PHOSPHATE SERPL-MCNC: 7.5 MG/DL (ref 2.5–4.5)
PHOSPHATE SERPL-MCNC: 7.6 MG/DL (ref 2.5–4.5)
PHOSPHATE SERPL-MCNC: 7.8 MG/DL (ref 2.5–4.5)
PLATELET # BLD AUTO: 130 10E3/UL (ref 150–450)
PLATELET # BLD AUTO: 131 10E3/UL (ref 150–450)
PLATELET # BLD AUTO: 143 10E3/UL (ref 150–450)
PLATELET # BLD AUTO: 145 10E3/UL (ref 150–450)
PLATELET # BLD AUTO: 148 10E3/UL (ref 150–450)
PLATELET # BLD AUTO: 151 10E3/UL (ref 150–450)
PLATELET # BLD AUTO: 156 10E3/UL (ref 150–450)
PLATELET # BLD AUTO: 189 10E3/UL (ref 150–450)
PLATELET # BLD AUTO: 216 10E3/UL (ref 150–450)
PLATELET # BLD AUTO: 296 10E3/UL (ref 150–450)
PLATELET # BLD AUTO: 297 10E3/UL (ref 150–450)
PO2 BLDV: 17 MM HG (ref 25–47)
PO2 BLDV: 21 MM HG (ref 25–47)
PO2 BLDV: 26 MM HG (ref 25–47)
PO2 BLDV: 48 MM HG (ref 25–47)
PO2 BLDV: 50 MM HG (ref 25–47)
PO2 BLDV: 51 MM HG (ref 25–47)
PO2 BLDV: 53 MM HG (ref 25–47)
POTASSIUM SERPL-SCNC: 3.8 MMOL/L (ref 3.4–5.3)
POTASSIUM SERPL-SCNC: 3.9 MMOL/L (ref 3.4–5.3)
POTASSIUM SERPL-SCNC: 4 MMOL/L (ref 3.4–5.3)
POTASSIUM SERPL-SCNC: 4 MMOL/L (ref 3.4–5.3)
POTASSIUM SERPL-SCNC: 4.2 MMOL/L (ref 3.4–5.3)
POTASSIUM SERPL-SCNC: 4.2 MMOL/L (ref 3.4–5.3)
POTASSIUM SERPL-SCNC: 4.3 MMOL/L (ref 3.4–5.3)
POTASSIUM SERPL-SCNC: 4.3 MMOL/L (ref 3.4–5.3)
POTASSIUM SERPL-SCNC: 4.5 MMOL/L (ref 3.4–5.3)
POTASSIUM SERPL-SCNC: 4.5 MMOL/L (ref 3.4–5.3)
POTASSIUM SERPL-SCNC: 4.6 MMOL/L (ref 3.4–5.3)
POTASSIUM SERPL-SCNC: 4.7 MMOL/L (ref 3.4–5.3)
POTASSIUM SERPL-SCNC: 4.8 MMOL/L (ref 3.4–5.3)
POTASSIUM SERPL-SCNC: 4.9 MMOL/L (ref 3.4–5.3)
POTASSIUM SERPL-SCNC: 5.1 MMOL/L (ref 3.4–5.3)
POTASSIUM SERPL-SCNC: 5.1 MMOL/L (ref 3.4–5.3)
POTASSIUM SERPL-SCNC: 5.3 MMOL/L (ref 3.4–5.3)
POTASSIUM SERPL-SCNC: 6.1 MMOL/L (ref 3.4–5.3)
PR INTERVAL - MUSE: NORMAL MS
PR INTERVAL - MUSE: NORMAL MS
PROT SERPL-MCNC: 5.9 G/DL (ref 6.4–8.3)
PROT SERPL-MCNC: 6 G/DL (ref 6.4–8.3)
PROT SERPL-MCNC: 6.3 G/DL (ref 6.4–8.3)
PROT SERPL-MCNC: 6.4 G/DL (ref 6.4–8.3)
PROT SERPL-MCNC: 6.5 G/DL (ref 6.4–8.3)
PROT/CREAT 24H UR: 0.74 MG/MG CR (ref 0–0.2)
PROT/CREAT 24H UR: 0.76 MG/MG CR (ref 0–0.2)
QRS DURATION - MUSE: 126 MS
QRS DURATION - MUSE: 144 MS
QT - MUSE: 342 MS
QT - MUSE: 486 MS
QTC - MUSE: 466 MS
QTC - MUSE: 493 MS
R AXIS - MUSE: -44 DEGREES
R AXIS - MUSE: -49 DEGREES
RBC # BLD AUTO: 3.7 10E6/UL (ref 4.4–5.9)
RBC # BLD AUTO: 3.86 10E6/UL (ref 4.4–5.9)
RBC # BLD AUTO: 3.93 10E6/UL (ref 4.4–5.9)
RBC # BLD AUTO: 3.97 10E6/UL (ref 4.4–5.9)
RBC # BLD AUTO: 4.03 10E6/UL (ref 4.4–5.9)
RBC # BLD AUTO: 4.06 10E6/UL (ref 4.4–5.9)
RBC # BLD AUTO: 4.11 10E6/UL (ref 4.4–5.9)
RBC # BLD AUTO: 4.21 10E6/UL (ref 4.4–5.9)
RBC # BLD AUTO: 4.23 10E6/UL (ref 4.4–5.9)
RBC # BLD AUTO: 4.29 10E6/UL (ref 4.4–5.9)
RBC # BLD AUTO: 4.4 10E6/UL (ref 4.4–5.9)
RBC # BLD AUTO: 4.49 10E6/UL (ref 4.4–5.9)
RBC # BLD AUTO: 4.81 10E6/UL (ref 4.4–5.9)
RBC URINE: 1 /HPF
RBC URINE: 5 /HPF
RBC URINE: 5 /HPF
RBC URINE: <1 /HPF
RSV RNA SPEC NAA+PROBE: NEGATIVE
S PNEUM AG SPEC QL: NEGATIVE
SA TARGET DNA: NEGATIVE
SAO2 % BLDV: 20 % (ref 94–100)
SARS-COV-2 RNA RESP QL NAA+PROBE: NEGATIVE
SODIUM SERPL-SCNC: 141 MMOL/L (ref 136–145)
SODIUM SERPL-SCNC: 142 MMOL/L (ref 135–145)
SODIUM SERPL-SCNC: 142 MMOL/L (ref 135–145)
SODIUM SERPL-SCNC: 143 MMOL/L (ref 135–145)
SODIUM SERPL-SCNC: 144 MMOL/L (ref 135–145)
SODIUM SERPL-SCNC: 144 MMOL/L (ref 136–145)
SODIUM SERPL-SCNC: 145 MMOL/L (ref 135–145)
SODIUM SERPL-SCNC: 146 MMOL/L (ref 135–145)
SODIUM SERPL-SCNC: 147 MMOL/L (ref 135–145)
SODIUM SERPL-SCNC: 147 MMOL/L (ref 135–145)
SODIUM SERPL-SCNC: 149 MMOL/L (ref 135–145)
SP GR UR STRIP: 1.01 (ref 1–1.03)
SP GR UR STRIP: 1.01 (ref 1–1.03)
SP GR UR STRIP: 1.02 (ref 1–1.03)
SP GR UR STRIP: 1.02 (ref 1–1.03)
SQUAMOUS EPITHELIAL: <1 /HPF
SYSTOLIC BLOOD PRESSURE - MUSE: NORMAL MMHG
SYSTOLIC BLOOD PRESSURE - MUSE: NORMAL MMHG
T AXIS - MUSE: -16 DEGREES
T AXIS - MUSE: -9 DEGREES
TRANSITIONAL EPI: <1 /HPF
TROPONIN T SERPL HS-MCNC: 46 NG/L
TROPONIN T SERPL HS-MCNC: 63 NG/L
TROPONIN T SERPL HS-MCNC: 67 NG/L
UROBILINOGEN UR STRIP-MCNC: NORMAL MG/DL
VANCOMYCIN SERPL-MCNC: 13.5 UG/ML
VENTRICULAR RATE- MUSE: 112 BPM
VENTRICULAR RATE- MUSE: 62 BPM
WBC # BLD AUTO: 10.7 10E3/UL (ref 4–11)
WBC # BLD AUTO: 10.9 10E3/UL (ref 4–11)
WBC # BLD AUTO: 11 10E3/UL (ref 4–11)
WBC # BLD AUTO: 11.7 10E3/UL (ref 4–11)
WBC # BLD AUTO: 12.1 10E3/UL (ref 4–11)
WBC # BLD AUTO: 13.1 10E3/UL (ref 4–11)
WBC # BLD AUTO: 13.5 10E3/UL (ref 4–11)
WBC # BLD AUTO: 14 10E3/UL (ref 4–11)
WBC # BLD AUTO: 20.5 10E3/UL (ref 4–11)
WBC # BLD AUTO: 21.5 10E3/UL (ref 4–11)
WBC # BLD AUTO: 5.6 10E3/UL (ref 4–11)
WBC # BLD AUTO: 6.6 10E3/UL (ref 4–11)
WBC # BLD AUTO: 8.1 10E3/UL (ref 4–11)
WBC URINE: 120 /HPF
WBC URINE: 13 /HPF
WBC URINE: 3 /HPF
WBC URINE: <1 /HPF

## 2023-01-01 PROCEDURE — 36415 COLL VENOUS BLD VENIPUNCTURE: CPT | Performed by: STUDENT IN AN ORGANIZED HEALTH CARE EDUCATION/TRAINING PROGRAM

## 2023-01-01 PROCEDURE — 85730 THROMBOPLASTIN TIME PARTIAL: CPT | Performed by: FAMILY MEDICINE

## 2023-01-01 PROCEDURE — 99233 SBSQ HOSP IP/OBS HIGH 50: CPT | Mod: GC | Performed by: INTERNAL MEDICINE

## 2023-01-01 PROCEDURE — 84100 ASSAY OF PHOSPHORUS: CPT | Performed by: STUDENT IN AN ORGANIZED HEALTH CARE EDUCATION/TRAINING PROGRAM

## 2023-01-01 PROCEDURE — 250N000013 HC RX MED GY IP 250 OP 250 PS 637: Performed by: STUDENT IN AN ORGANIZED HEALTH CARE EDUCATION/TRAINING PROGRAM

## 2023-01-01 PROCEDURE — 71045 X-RAY EXAM CHEST 1 VIEW: CPT | Mod: 26 | Performed by: STUDENT IN AN ORGANIZED HEALTH CARE EDUCATION/TRAINING PROGRAM

## 2023-01-01 PROCEDURE — 999N000157 HC STATISTIC RCP TIME EA 10 MIN

## 2023-01-01 PROCEDURE — 120N000003 HC R&B IMCU UMMC

## 2023-01-01 PROCEDURE — 83735 ASSAY OF MAGNESIUM: CPT | Performed by: STUDENT IN AN ORGANIZED HEALTH CARE EDUCATION/TRAINING PROGRAM

## 2023-01-01 PROCEDURE — 250N000011 HC RX IP 250 OP 636: Performed by: STUDENT IN AN ORGANIZED HEALTH CARE EDUCATION/TRAINING PROGRAM

## 2023-01-01 PROCEDURE — 71045 X-RAY EXAM CHEST 1 VIEW: CPT | Mod: 26 | Performed by: RADIOLOGY

## 2023-01-01 PROCEDURE — 80048 BASIC METABOLIC PNL TOTAL CA: CPT | Performed by: STUDENT IN AN ORGANIZED HEALTH CARE EDUCATION/TRAINING PROGRAM

## 2023-01-01 PROCEDURE — 97110 THERAPEUTIC EXERCISES: CPT | Mod: GP

## 2023-01-01 PROCEDURE — P9604 ONE-WAY ALLOW PRORATED TRIP: HCPCS | Mod: ORL | Performed by: NURSE PRACTITIONER

## 2023-01-01 PROCEDURE — 99232 SBSQ HOSP IP/OBS MODERATE 35: CPT | Mod: GC | Performed by: INTERNAL MEDICINE

## 2023-01-01 PROCEDURE — 99349 HOME/RES VST EST MOD MDM 40: CPT | Performed by: NURSE PRACTITIONER

## 2023-01-01 PROCEDURE — 250N000009 HC RX 250

## 2023-01-01 PROCEDURE — G1010 CDSM STANSON: HCPCS | Mod: GC | Performed by: RADIOLOGY

## 2023-01-01 PROCEDURE — 258N000003 HC RX IP 258 OP 636: Performed by: FAMILY MEDICINE

## 2023-01-01 PROCEDURE — 36415 COLL VENOUS BLD VENIPUNCTURE: CPT | Mod: ORL | Performed by: NURSE PRACTITIONER

## 2023-01-01 PROCEDURE — 99291 CRITICAL CARE FIRST HOUR: CPT | Mod: 25 | Performed by: FAMILY MEDICINE

## 2023-01-01 PROCEDURE — 83735 ASSAY OF MAGNESIUM: CPT | Mod: ORL | Performed by: NURSE PRACTITIONER

## 2023-01-01 PROCEDURE — 258N000003 HC RX IP 258 OP 636: Performed by: STUDENT IN AN ORGANIZED HEALTH CARE EDUCATION/TRAINING PROGRAM

## 2023-01-01 PROCEDURE — 85610 PROTHROMBIN TIME: CPT | Performed by: FAMILY MEDICINE

## 2023-01-01 PROCEDURE — 93010 ELECTROCARDIOGRAM REPORT: CPT | Performed by: INTERNAL MEDICINE

## 2023-01-01 PROCEDURE — 97535 SELF CARE MNGMENT TRAINING: CPT | Mod: GO

## 2023-01-01 PROCEDURE — 70450 CT HEAD/BRAIN W/O DYE: CPT | Mod: 26 | Performed by: RADIOLOGY

## 2023-01-01 PROCEDURE — 80202 ASSAY OF VANCOMYCIN: CPT | Performed by: STUDENT IN AN ORGANIZED HEALTH CARE EDUCATION/TRAINING PROGRAM

## 2023-01-01 PROCEDURE — 94640 AIRWAY INHALATION TREATMENT: CPT | Mod: 76

## 2023-01-01 PROCEDURE — 250N000009 HC RX 250: Performed by: STUDENT IN AN ORGANIZED HEALTH CARE EDUCATION/TRAINING PROGRAM

## 2023-01-01 PROCEDURE — 97535 SELF CARE MNGMENT TRAINING: CPT | Mod: GO | Performed by: OCCUPATIONAL THERAPIST

## 2023-01-01 PROCEDURE — 258N000003 HC RX IP 258 OP 636

## 2023-01-01 PROCEDURE — 81001 URINALYSIS AUTO W/SCOPE: CPT | Performed by: STUDENT IN AN ORGANIZED HEALTH CARE EDUCATION/TRAINING PROGRAM

## 2023-01-01 PROCEDURE — 92610 EVALUATE SWALLOWING FUNCTION: CPT | Mod: GN

## 2023-01-01 PROCEDURE — 83880 ASSAY OF NATRIURETIC PEPTIDE: CPT | Performed by: FAMILY MEDICINE

## 2023-01-01 PROCEDURE — 84484 ASSAY OF TROPONIN QUANT: CPT | Mod: 91 | Performed by: STUDENT IN AN ORGANIZED HEALTH CARE EDUCATION/TRAINING PROGRAM

## 2023-01-01 PROCEDURE — 85027 COMPLETE CBC AUTOMATED: CPT | Performed by: STUDENT IN AN ORGANIZED HEALTH CARE EDUCATION/TRAINING PROGRAM

## 2023-01-01 PROCEDURE — 120N000002 HC R&B MED SURG/OB UMMC

## 2023-01-01 PROCEDURE — 92526 ORAL FUNCTION THERAPY: CPT | Mod: GN

## 2023-01-01 PROCEDURE — 85025 COMPLETE CBC W/AUTO DIFF WBC: CPT | Performed by: STUDENT IN AN ORGANIZED HEALTH CARE EDUCATION/TRAINING PROGRAM

## 2023-01-01 PROCEDURE — 71045 X-RAY EXAM CHEST 1 VIEW: CPT

## 2023-01-01 PROCEDURE — 99232 SBSQ HOSP IP/OBS MODERATE 35: CPT | Performed by: INTERNAL MEDICINE

## 2023-01-01 PROCEDURE — 120N000005 HC R&B MS OVERFLOW UMMC

## 2023-01-01 PROCEDURE — 99232 SBSQ HOSP IP/OBS MODERATE 35: CPT | Mod: GC | Performed by: STUDENT IN AN ORGANIZED HEALTH CARE EDUCATION/TRAINING PROGRAM

## 2023-01-01 PROCEDURE — 99222 1ST HOSP IP/OBS MODERATE 55: CPT | Mod: AI | Performed by: STUDENT IN AN ORGANIZED HEALTH CARE EDUCATION/TRAINING PROGRAM

## 2023-01-01 PROCEDURE — 250N000013 HC RX MED GY IP 250 OP 250 PS 637: Performed by: PHYSICIAN ASSISTANT

## 2023-01-01 PROCEDURE — 87899 AGENT NOS ASSAY W/OPTIC: CPT | Performed by: PHYSICIAN ASSISTANT

## 2023-01-01 PROCEDURE — 250N000013 HC RX MED GY IP 250 OP 250 PS 637

## 2023-01-01 PROCEDURE — 87641 MR-STAPH DNA AMP PROBE: CPT

## 2023-01-01 PROCEDURE — 206N000001 HC R&B BMT UMMC

## 2023-01-01 PROCEDURE — 81001 URINALYSIS AUTO W/SCOPE: CPT

## 2023-01-01 PROCEDURE — 99232 SBSQ HOSP IP/OBS MODERATE 35: CPT | Mod: 25 | Performed by: STUDENT IN AN ORGANIZED HEALTH CARE EDUCATION/TRAINING PROGRAM

## 2023-01-01 PROCEDURE — 36415 COLL VENOUS BLD VENIPUNCTURE: CPT | Performed by: PHYSICIAN ASSISTANT

## 2023-01-01 PROCEDURE — 80048 BASIC METABOLIC PNL TOTAL CA: CPT | Mod: ORL | Performed by: NURSE PRACTITIONER

## 2023-01-01 PROCEDURE — 83735 ASSAY OF MAGNESIUM: CPT | Performed by: FAMILY MEDICINE

## 2023-01-01 PROCEDURE — 85027 COMPLETE CBC AUTOMATED: CPT | Performed by: PHYSICIAN ASSISTANT

## 2023-01-01 PROCEDURE — 97530 THERAPEUTIC ACTIVITIES: CPT | Mod: GO

## 2023-01-01 PROCEDURE — 80053 COMPREHEN METABOLIC PANEL: CPT | Performed by: STUDENT IN AN ORGANIZED HEALTH CARE EDUCATION/TRAINING PROGRAM

## 2023-01-01 PROCEDURE — 84156 ASSAY OF PROTEIN URINE: CPT

## 2023-01-01 PROCEDURE — 84156 ASSAY OF PROTEIN URINE: CPT | Performed by: STUDENT IN AN ORGANIZED HEALTH CARE EDUCATION/TRAINING PROGRAM

## 2023-01-01 PROCEDURE — 84100 ASSAY OF PHOSPHORUS: CPT | Performed by: FAMILY MEDICINE

## 2023-01-01 PROCEDURE — 84460 ALANINE AMINO (ALT) (SGPT): CPT | Performed by: STUDENT IN AN ORGANIZED HEALTH CARE EDUCATION/TRAINING PROGRAM

## 2023-01-01 PROCEDURE — 36415 COLL VENOUS BLD VENIPUNCTURE: CPT

## 2023-01-01 PROCEDURE — 85025 COMPLETE CBC W/AUTO DIFF WBC: CPT | Performed by: FAMILY MEDICINE

## 2023-01-01 PROCEDURE — 83605 ASSAY OF LACTIC ACID: CPT | Performed by: PHYSICIAN ASSISTANT

## 2023-01-01 PROCEDURE — 87637 SARSCOV2&INF A&B&RSV AMP PRB: CPT | Performed by: FAMILY MEDICINE

## 2023-01-01 PROCEDURE — 82803 BLOOD GASES ANY COMBINATION: CPT

## 2023-01-01 PROCEDURE — 82803 BLOOD GASES ANY COMBINATION: CPT | Performed by: STUDENT IN AN ORGANIZED HEALTH CARE EDUCATION/TRAINING PROGRAM

## 2023-01-01 PROCEDURE — 99223 1ST HOSP IP/OBS HIGH 75: CPT | Mod: GC | Performed by: INTERNAL MEDICINE

## 2023-01-01 PROCEDURE — 93306 TTE W/DOPPLER COMPLETE: CPT

## 2023-01-01 PROCEDURE — 99291 CRITICAL CARE FIRST HOUR: CPT | Performed by: PHYSICIAN ASSISTANT

## 2023-01-01 PROCEDURE — 80048 BASIC METABOLIC PNL TOTAL CA: CPT | Performed by: PHYSICIAN ASSISTANT

## 2023-01-01 PROCEDURE — 85027 COMPLETE CBC AUTOMATED: CPT

## 2023-01-01 PROCEDURE — 93005 ELECTROCARDIOGRAM TRACING: CPT | Performed by: FAMILY MEDICINE

## 2023-01-01 PROCEDURE — 76770 US EXAM ABDO BACK WALL COMP: CPT | Mod: 26 | Performed by: RADIOLOGY

## 2023-01-01 PROCEDURE — 999N000248 HC STATISTIC IV INSERT WITH US BY RN

## 2023-01-01 PROCEDURE — 999N000128 HC STATISTIC PERIPHERAL IV START W/O US GUIDANCE

## 2023-01-01 PROCEDURE — 36415 COLL VENOUS BLD VENIPUNCTURE: CPT | Performed by: FAMILY MEDICINE

## 2023-01-01 PROCEDURE — 97530 THERAPEUTIC ACTIVITIES: CPT | Mod: GP

## 2023-01-01 PROCEDURE — 87040 BLOOD CULTURE FOR BACTERIA: CPT

## 2023-01-01 PROCEDURE — 82248 BILIRUBIN DIRECT: CPT | Performed by: PHYSICIAN ASSISTANT

## 2023-01-01 PROCEDURE — 250N000011 HC RX IP 250 OP 636

## 2023-01-01 PROCEDURE — 99239 HOSP IP/OBS DSCHRG MGMT >30: CPT | Performed by: STUDENT IN AN ORGANIZED HEALTH CARE EDUCATION/TRAINING PROGRAM

## 2023-01-01 PROCEDURE — 83735 ASSAY OF MAGNESIUM: CPT | Performed by: PHYSICIAN ASSISTANT

## 2023-01-01 PROCEDURE — 86140 C-REACTIVE PROTEIN: CPT | Performed by: FAMILY MEDICINE

## 2023-01-01 PROCEDURE — 87040 BLOOD CULTURE FOR BACTERIA: CPT | Performed by: FAMILY MEDICINE

## 2023-01-01 PROCEDURE — 97161 PT EVAL LOW COMPLEX 20 MIN: CPT | Mod: GP

## 2023-01-01 PROCEDURE — 99233 SBSQ HOSP IP/OBS HIGH 50: CPT | Mod: GC | Performed by: STUDENT IN AN ORGANIZED HEALTH CARE EDUCATION/TRAINING PROGRAM

## 2023-01-01 PROCEDURE — 94640 AIRWAY INHALATION TREATMENT: CPT

## 2023-01-01 PROCEDURE — 82550 ASSAY OF CK (CPK): CPT | Performed by: STUDENT IN AN ORGANIZED HEALTH CARE EDUCATION/TRAINING PROGRAM

## 2023-01-01 PROCEDURE — 93005 ELECTROCARDIOGRAM TRACING: CPT

## 2023-01-01 PROCEDURE — 82140 ASSAY OF AMMONIA: CPT | Performed by: FAMILY MEDICINE

## 2023-01-01 PROCEDURE — 85027 COMPLETE CBC AUTOMATED: CPT | Mod: ORL | Performed by: NURSE PRACTITIONER

## 2023-01-01 PROCEDURE — 82040 ASSAY OF SERUM ALBUMIN: CPT | Performed by: FAMILY MEDICINE

## 2023-01-01 PROCEDURE — 82803 BLOOD GASES ANY COMBINATION: CPT | Performed by: PHYSICIAN ASSISTANT

## 2023-01-01 PROCEDURE — 96365 THER/PROPH/DIAG IV INF INIT: CPT | Performed by: FAMILY MEDICINE

## 2023-01-01 PROCEDURE — 97110 THERAPEUTIC EXERCISES: CPT | Mod: GO

## 2023-01-01 PROCEDURE — 5A0945A ASSISTANCE WITH RESPIRATORY VENTILATION, 24-96 CONSECUTIVE HOURS, HIGH NASAL FLOW/VELOCITY: ICD-10-PCS | Performed by: PHYSICIAN ASSISTANT

## 2023-01-01 PROCEDURE — 76770 US EXAM ABDO BACK WALL COMP: CPT

## 2023-01-01 PROCEDURE — 84100 ASSAY OF PHOSPHORUS: CPT | Performed by: PHYSICIAN ASSISTANT

## 2023-01-01 PROCEDURE — 81001 URINALYSIS AUTO W/SCOPE: CPT | Performed by: FAMILY MEDICINE

## 2023-01-01 PROCEDURE — 97530 THERAPEUTIC ACTIVITIES: CPT | Mod: GO | Performed by: OCCUPATIONAL THERAPIST

## 2023-01-01 PROCEDURE — 97116 GAIT TRAINING THERAPY: CPT | Mod: GP

## 2023-01-01 PROCEDURE — 96361 HYDRATE IV INFUSION ADD-ON: CPT | Performed by: FAMILY MEDICINE

## 2023-01-01 PROCEDURE — 70450 CT HEAD/BRAIN W/O DYE: CPT | Mod: ME

## 2023-01-01 PROCEDURE — 83605 ASSAY OF LACTIC ACID: CPT | Performed by: FAMILY MEDICINE

## 2023-01-01 PROCEDURE — 250N000011 HC RX IP 250 OP 636: Mod: JZ

## 2023-01-01 PROCEDURE — 93306 TTE W/DOPPLER COMPLETE: CPT | Mod: 26 | Performed by: STUDENT IN AN ORGANIZED HEALTH CARE EDUCATION/TRAINING PROGRAM

## 2023-01-01 PROCEDURE — 97165 OT EVAL LOW COMPLEX 30 MIN: CPT | Mod: GO

## 2023-01-01 PROCEDURE — 258N000003 HC RX IP 258 OP 636: Performed by: PHYSICIAN ASSISTANT

## 2023-01-01 PROCEDURE — 87640 STAPH A DNA AMP PROBE: CPT

## 2023-01-01 PROCEDURE — 87086 URINE CULTURE/COLONY COUNT: CPT | Performed by: FAMILY MEDICINE

## 2023-01-01 PROCEDURE — 93010 ELECTROCARDIOGRAM REPORT: CPT | Performed by: FAMILY MEDICINE

## 2023-01-01 PROCEDURE — P9603 ONE-WAY ALLOW PRORATED MILES: HCPCS | Mod: ORL | Performed by: NURSE PRACTITIONER

## 2023-01-01 PROCEDURE — 71045 X-RAY EXAM CHEST 1 VIEW: CPT | Mod: 77

## 2023-01-01 PROCEDURE — 82374 ASSAY BLOOD CARBON DIOXIDE: CPT | Performed by: STUDENT IN AN ORGANIZED HEALTH CARE EDUCATION/TRAINING PROGRAM

## 2023-01-01 PROCEDURE — 84484 ASSAY OF TROPONIN QUANT: CPT | Performed by: FAMILY MEDICINE

## 2023-01-01 PROCEDURE — 999N000215 HC STATISTIC HFNC ADULT NON-CPAP

## 2023-01-01 PROCEDURE — 250N000011 HC RX IP 250 OP 636: Performed by: FAMILY MEDICINE

## 2023-01-01 PROCEDURE — 84132 ASSAY OF SERUM POTASSIUM: CPT | Performed by: FAMILY MEDICINE

## 2023-01-01 RX ORDER — TRAZODONE HYDROCHLORIDE 50 MG/1
TABLET, FILM COATED ORAL
Qty: 90 TABLET | Refills: 97 | Status: SHIPPED | OUTPATIENT
Start: 2023-01-01

## 2023-01-01 RX ORDER — POLYETHYLENE GLYCOL 3350 17 G/17G
17 POWDER, FOR SOLUTION ORAL 2 TIMES DAILY PRN
Status: DISCONTINUED | OUTPATIENT
Start: 2023-01-01 | End: 2023-01-01 | Stop reason: HOSPADM

## 2023-01-01 RX ORDER — ALBUTEROL SULFATE 0.83 MG/ML
2.5 SOLUTION RESPIRATORY (INHALATION)
Status: DISCONTINUED | OUTPATIENT
Start: 2023-01-01 | End: 2023-01-01

## 2023-01-01 RX ORDER — ALBUTEROL SULFATE 0.83 MG/ML
2.5 SOLUTION RESPIRATORY (INHALATION) EVERY 4 HOURS PRN
Status: DISCONTINUED | OUTPATIENT
Start: 2023-01-01 | End: 2023-01-01 | Stop reason: HOSPADM

## 2023-01-01 RX ORDER — PRIMIDONE 50 MG/1
25 TABLET ORAL DAILY
Status: DISCONTINUED | OUTPATIENT
Start: 2023-01-01 | End: 2023-01-01 | Stop reason: HOSPADM

## 2023-01-01 RX ORDER — GABAPENTIN 300 MG/1
CAPSULE ORAL
Qty: 180 CAPSULE | Refills: 97 | Status: SHIPPED | OUTPATIENT
Start: 2023-01-01

## 2023-01-01 RX ORDER — ACETAMINOPHEN 325 MG/1
650 TABLET ORAL EVERY 4 HOURS PRN
Status: DISCONTINUED | OUTPATIENT
Start: 2023-01-01 | End: 2023-01-01 | Stop reason: HOSPADM

## 2023-01-01 RX ORDER — FOLIC ACID 1 MG/1
1 TABLET ORAL DAILY
Status: DISCONTINUED | OUTPATIENT
Start: 2023-01-01 | End: 2023-01-01

## 2023-01-01 RX ORDER — ALBUTEROL SULFATE 0.83 MG/ML
2.5 SOLUTION RESPIRATORY (INHALATION) 4 TIMES DAILY
Status: DISCONTINUED | OUTPATIENT
Start: 2023-01-01 | End: 2023-01-01

## 2023-01-01 RX ORDER — MIRTAZAPINE 7.5 MG/1
15 TABLET, FILM COATED ORAL AT BEDTIME
Status: DISCONTINUED | OUTPATIENT
Start: 2023-01-01 | End: 2023-01-01 | Stop reason: HOSPADM

## 2023-01-01 RX ORDER — CALCIUM CARBONATE 500 MG/1
1000 TABLET, CHEWABLE ORAL 4 TIMES DAILY PRN
Status: DISCONTINUED | OUTPATIENT
Start: 2023-01-01 | End: 2023-01-01 | Stop reason: HOSPADM

## 2023-01-01 RX ORDER — PIPERACILLIN SODIUM, TAZOBACTAM SODIUM 2; .25 G/10ML; G/10ML
2.25 INJECTION, POWDER, LYOPHILIZED, FOR SOLUTION INTRAVENOUS EVERY 6 HOURS
Status: DISCONTINUED | OUTPATIENT
Start: 2023-01-01 | End: 2023-01-01

## 2023-01-01 RX ORDER — SODIUM BICARBONATE 650 MG/1
1300 TABLET ORAL 3 TIMES DAILY
Status: DISCONTINUED | OUTPATIENT
Start: 2023-01-01 | End: 2023-01-01 | Stop reason: HOSPADM

## 2023-01-01 RX ORDER — METOPROLOL SUCCINATE 25 MG/1
25 TABLET, EXTENDED RELEASE ORAL 2 TIMES DAILY
Qty: 60 TABLET | Refills: 11 | Status: SHIPPED | OUTPATIENT
Start: 2023-01-01 | End: 2023-01-01

## 2023-01-01 RX ORDER — APIXABAN 2.5 MG/1
TABLET, FILM COATED ORAL
Qty: 56 TABLET | Refills: 97 | Status: SHIPPED | OUTPATIENT
Start: 2023-01-01

## 2023-01-01 RX ORDER — OLANZAPINE 2.5 MG/1
2.5 TABLET, FILM COATED ORAL ONCE
Status: COMPLETED | OUTPATIENT
Start: 2023-01-01 | End: 2023-01-01

## 2023-01-01 RX ORDER — OLANZAPINE 10 MG/2ML
5 INJECTION, POWDER, FOR SOLUTION INTRAMUSCULAR
Status: DISCONTINUED | OUTPATIENT
Start: 2023-01-01 | End: 2023-01-01

## 2023-01-01 RX ORDER — FOLIC ACID 1 MG/1
1 TABLET ORAL EVERY MORNING
Status: DISCONTINUED | OUTPATIENT
Start: 2023-01-01 | End: 2023-01-01

## 2023-01-01 RX ORDER — FUROSEMIDE 10 MG/ML
20 INJECTION INTRAMUSCULAR; INTRAVENOUS ONCE
Status: DISCONTINUED | OUTPATIENT
Start: 2023-01-01 | End: 2023-01-01

## 2023-01-01 RX ORDER — SODIUM CHLORIDE, SODIUM LACTATE, POTASSIUM CHLORIDE, CALCIUM CHLORIDE 600; 310; 30; 20 MG/100ML; MG/100ML; MG/100ML; MG/100ML
INJECTION, SOLUTION INTRAVENOUS CONTINUOUS
Status: DISCONTINUED | OUTPATIENT
Start: 2023-01-01 | End: 2023-01-01

## 2023-01-01 RX ORDER — MIDODRINE HYDROCHLORIDE 5 MG/1
5 TABLET ORAL 2 TIMES DAILY
Status: DISCONTINUED | OUTPATIENT
Start: 2023-01-01 | End: 2023-01-01

## 2023-01-01 RX ORDER — AMOXICILLIN AND CLAVULANATE POTASSIUM 500; 125 MG/1; MG/1
1 TABLET, FILM COATED ORAL EVERY 12 HOURS SCHEDULED
Status: COMPLETED | OUTPATIENT
Start: 2023-01-01 | End: 2023-01-01

## 2023-01-01 RX ORDER — OLANZAPINE 10 MG/2ML
2.5 INJECTION, POWDER, FOR SOLUTION INTRAMUSCULAR
Status: COMPLETED | OUTPATIENT
Start: 2023-01-01 | End: 2023-01-01

## 2023-01-01 RX ORDER — MINERAL OIL/HYDROPHIL PETROLAT
OINTMENT (GRAM) TOPICAL
Status: DISCONTINUED | OUTPATIENT
Start: 2023-01-01 | End: 2023-01-01 | Stop reason: HOSPADM

## 2023-01-01 RX ORDER — HYDROXYZINE HYDROCHLORIDE 25 MG/1
25 TABLET, FILM COATED ORAL ONCE
Status: COMPLETED | OUTPATIENT
Start: 2023-01-01 | End: 2023-01-01

## 2023-01-01 RX ORDER — OLANZAPINE 2.5 MG/1
2.5 TABLET, FILM COATED ORAL DAILY PRN
Status: DISCONTINUED | OUTPATIENT
Start: 2023-01-01 | End: 2023-01-01 | Stop reason: HOSPADM

## 2023-01-01 RX ORDER — METOPROLOL SUCCINATE 50 MG/1
TABLET, EXTENDED RELEASE ORAL
Qty: 90 TABLET | Refills: 97 | Status: SHIPPED | OUTPATIENT
Start: 2023-01-01 | End: 2023-01-01 | Stop reason: DRUGHIGH

## 2023-01-01 RX ORDER — OMEGA-3S/DHA/EPA/FISH OIL/D3 300MG-1000
CAPSULE ORAL
Qty: 90 TABLET | Refills: 97 | Status: SHIPPED | OUTPATIENT
Start: 2023-01-01

## 2023-01-01 RX ORDER — DEXTROSE MONOHYDRATE 50 MG/ML
INJECTION, SOLUTION INTRAVENOUS CONTINUOUS
Status: DISPENSED | OUTPATIENT
Start: 2023-01-01 | End: 2023-01-01

## 2023-01-01 RX ORDER — MAGNESIUM OXIDE 400 MG/1
TABLET ORAL
Qty: 90 TABLET | Refills: 97 | Status: SHIPPED | OUTPATIENT
Start: 2023-01-01 | End: 2023-01-01

## 2023-01-01 RX ORDER — PIPERACILLIN SODIUM, TAZOBACTAM SODIUM 3; .375 G/15ML; G/15ML
3.38 INJECTION, POWDER, LYOPHILIZED, FOR SOLUTION INTRAVENOUS EVERY 6 HOURS
Status: DISCONTINUED | OUTPATIENT
Start: 2023-01-01 | End: 2023-01-01

## 2023-01-01 RX ORDER — LANOLIN ALCOHOL/MO/W.PET/CERES
3 CREAM (GRAM) TOPICAL EVERY EVENING
Status: DISCONTINUED | OUTPATIENT
Start: 2023-01-01 | End: 2023-01-01

## 2023-01-01 RX ORDER — PIPERACILLIN SODIUM, TAZOBACTAM SODIUM 4; .5 G/20ML; G/20ML
4.5 INJECTION, POWDER, LYOPHILIZED, FOR SOLUTION INTRAVENOUS ONCE
Status: COMPLETED | OUTPATIENT
Start: 2023-01-01 | End: 2023-01-01

## 2023-01-01 RX ORDER — MULTIPLE VITAMINS W/ MINERALS TAB 9MG-400MCG
1 TAB ORAL DAILY
Status: DISCONTINUED | OUTPATIENT
Start: 2023-01-01 | End: 2023-01-01

## 2023-01-01 RX ORDER — MIDODRINE HYDROCHLORIDE 2.5 MG/1
2.5 TABLET ORAL 2 TIMES DAILY
Status: DISCONTINUED | OUTPATIENT
Start: 2023-01-01 | End: 2023-01-01 | Stop reason: HOSPADM

## 2023-01-01 RX ORDER — TRIAMCINOLONE ACETONIDE 1 MG/G
OINTMENT TOPICAL 2 TIMES DAILY
Status: DISCONTINUED | OUTPATIENT
Start: 2023-01-01 | End: 2023-01-01 | Stop reason: HOSPADM

## 2023-01-01 RX ORDER — ACETAMINOPHEN 650 MG/1
650 SUPPOSITORY RECTAL EVERY 4 HOURS PRN
Status: DISCONTINUED | OUTPATIENT
Start: 2023-01-01 | End: 2023-01-01 | Stop reason: HOSPADM

## 2023-01-01 RX ORDER — GABAPENTIN 300 MG/1
300 CAPSULE ORAL 2 TIMES DAILY
Status: DISCONTINUED | OUTPATIENT
Start: 2023-01-01 | End: 2023-01-01 | Stop reason: HOSPADM

## 2023-01-01 RX ORDER — MIDODRINE HYDROCHLORIDE 5 MG/1
TABLET ORAL
Qty: 180 TABLET | Refills: 97 | Status: SHIPPED | OUTPATIENT
Start: 2023-01-01

## 2023-01-01 RX ORDER — OLANZAPINE 10 MG/2ML
2.5 INJECTION, POWDER, FOR SOLUTION INTRAMUSCULAR ONCE
Status: COMPLETED | OUTPATIENT
Start: 2023-01-01 | End: 2023-01-01

## 2023-01-01 RX ORDER — AZITHROMYCIN 250 MG/1
500 TABLET, FILM COATED ORAL DAILY
Status: COMPLETED | OUTPATIENT
Start: 2023-01-01 | End: 2023-01-01

## 2023-01-01 RX ORDER — DEXTROSE MONOHYDRATE 50 MG/ML
INJECTION, SOLUTION INTRAVENOUS CONTINUOUS
Status: DISCONTINUED | OUTPATIENT
Start: 2023-01-01 | End: 2023-01-01

## 2023-01-01 RX ORDER — MULTIPLE VITAMINS W/ MINERALS TAB 9MG-400MCG
1 TAB ORAL EVERY MORNING
Status: DISCONTINUED | OUTPATIENT
Start: 2023-01-01 | End: 2023-01-01 | Stop reason: HOSPADM

## 2023-01-01 RX ORDER — SEVELAMER CARBONATE 800 MG/1
800 TABLET, FILM COATED ORAL
Status: CANCELLED | OUTPATIENT
Start: 2023-01-01

## 2023-01-01 RX ORDER — LIDOCAINE 40 MG/G
CREAM TOPICAL
Status: DISCONTINUED | OUTPATIENT
Start: 2023-01-01 | End: 2023-01-01 | Stop reason: HOSPADM

## 2023-01-01 RX ORDER — AMOXICILLIN 250 MG
2 CAPSULE ORAL 2 TIMES DAILY PRN
Status: DISCONTINUED | OUTPATIENT
Start: 2023-01-01 | End: 2023-01-01 | Stop reason: HOSPADM

## 2023-01-01 RX ORDER — OLANZAPINE 10 MG/2ML
2.5 INJECTION, POWDER, FOR SOLUTION INTRAMUSCULAR DAILY PRN
Status: DISCONTINUED | OUTPATIENT
Start: 2023-01-01 | End: 2023-01-01 | Stop reason: HOSPADM

## 2023-01-01 RX ORDER — CEFTRIAXONE 2 G/1
2 INJECTION, POWDER, FOR SOLUTION INTRAMUSCULAR; INTRAVENOUS EVERY 24 HOURS
Status: DISCONTINUED | OUTPATIENT
Start: 2023-01-01 | End: 2023-01-01

## 2023-01-01 RX ORDER — MIRTAZAPINE 15 MG/1
TABLET, FILM COATED ORAL
Qty: 28 TABLET | Refills: 97 | Status: SHIPPED | OUTPATIENT
Start: 2023-01-01

## 2023-01-01 RX ORDER — METOPROLOL SUCCINATE 25 MG/1
12.5 TABLET, EXTENDED RELEASE ORAL 2 TIMES DAILY
Qty: 30 TABLET | Refills: 11 | Status: SHIPPED | OUTPATIENT
Start: 2023-01-01

## 2023-01-01 RX ORDER — AMOXICILLIN 250 MG
1 CAPSULE ORAL 2 TIMES DAILY PRN
Status: DISCONTINUED | OUTPATIENT
Start: 2023-01-01 | End: 2023-01-01 | Stop reason: HOSPADM

## 2023-01-01 RX ORDER — METOPROLOL SUCCINATE 25 MG/1
TABLET, EXTENDED RELEASE ORAL
Qty: 90 TABLET | Refills: 97 | Status: SHIPPED | OUTPATIENT
Start: 2023-01-01 | End: 2023-01-01

## 2023-01-01 RX ORDER — PRIMIDONE 50 MG/1
TABLET ORAL
Qty: 28 TABLET | Refills: 88 | Status: SHIPPED | OUTPATIENT
Start: 2023-01-01

## 2023-01-01 RX ORDER — OLANZAPINE 10 MG/2ML
5 INJECTION, POWDER, FOR SOLUTION INTRAMUSCULAR DAILY PRN
Status: DISCONTINUED | OUTPATIENT
Start: 2023-01-01 | End: 2023-01-01

## 2023-01-01 RX ADMIN — CEFTRIAXONE SODIUM 2 G: 2 INJECTION, POWDER, FOR SOLUTION INTRAMUSCULAR; INTRAVENOUS at 15:46

## 2023-01-01 RX ADMIN — PIPERACILLIN AND TAZOBACTAM 3.38 G: 3; .375 INJECTION, POWDER, LYOPHILIZED, FOR SOLUTION INTRAVENOUS at 21:19

## 2023-01-01 RX ADMIN — APIXABAN 2.5 MG: 2.5 TABLET, FILM COATED ORAL at 09:11

## 2023-01-01 RX ADMIN — MIDODRINE HYDROCHLORIDE 5 MG: 5 TABLET ORAL at 20:39

## 2023-01-01 RX ADMIN — PIPERACILLIN AND TAZOBACTAM 3.38 G: 3; .375 INJECTION, POWDER, LYOPHILIZED, FOR SOLUTION INTRAVENOUS at 04:35

## 2023-01-01 RX ADMIN — ALBUTEROL SULFATE 2.5 MG: 2.5 SOLUTION RESPIRATORY (INHALATION) at 08:17

## 2023-01-01 RX ADMIN — APIXABAN 2.5 MG: 2.5 TABLET, FILM COATED ORAL at 09:31

## 2023-01-01 RX ADMIN — APIXABAN 2.5 MG: 2.5 TABLET, FILM COATED ORAL at 07:38

## 2023-01-01 RX ADMIN — Medication 1 TABLET: at 08:02

## 2023-01-01 RX ADMIN — PIPERACILLIN AND TAZOBACTAM 3.38 G: 3; .375 INJECTION, POWDER, LYOPHILIZED, FOR SOLUTION INTRAVENOUS at 10:32

## 2023-01-01 RX ADMIN — FOLIC ACID 1 MG: 1 TABLET ORAL at 08:30

## 2023-01-01 RX ADMIN — Medication 5 MG: at 21:24

## 2023-01-01 RX ADMIN — AMOXICILLIN AND CLAVULANATE POTASSIUM 1 TABLET: 500; 125 TABLET, FILM COATED ORAL at 20:21

## 2023-01-01 RX ADMIN — SODIUM BICARBONATE 650 MG TABLET 1300 MG: at 09:26

## 2023-01-01 RX ADMIN — APIXABAN 2.5 MG: 2.5 TABLET, FILM COATED ORAL at 19:35

## 2023-01-01 RX ADMIN — Medication 25 MG: at 08:02

## 2023-01-01 RX ADMIN — SODIUM BICARBONATE 650 MG TABLET 1300 MG: at 20:11

## 2023-01-01 RX ADMIN — APIXABAN 2.5 MG: 2.5 TABLET, FILM COATED ORAL at 20:11

## 2023-01-01 RX ADMIN — AMOXICILLIN AND CLAVULANATE POTASSIUM 1 TABLET: 500; 125 TABLET, FILM COATED ORAL at 08:47

## 2023-01-01 RX ADMIN — APIXABAN 2.5 MG: 2.5 TABLET, FILM COATED ORAL at 20:07

## 2023-01-01 RX ADMIN — Medication 5 MG: at 18:53

## 2023-01-01 RX ADMIN — CARBIDOPA AND LEVODOPA 2.5 MG: 50; 200 TABLET, EXTENDED RELEASE ORAL at 16:31

## 2023-01-01 RX ADMIN — THIAMINE HCL TAB 100 MG 100 MG: 100 TAB at 07:38

## 2023-01-01 RX ADMIN — PIPERACILLIN SODIUM AND TAZOBACTAM SODIUM 2.25 G: 2; .25 INJECTION, POWDER, LYOPHILIZED, FOR SOLUTION INTRAVENOUS at 15:43

## 2023-01-01 RX ADMIN — ACETAMINOPHEN 650 MG: 325 TABLET, FILM COATED ORAL at 12:30

## 2023-01-01 RX ADMIN — AZITHROMYCIN 500 MG: 250 TABLET, FILM COATED ORAL at 08:17

## 2023-01-01 RX ADMIN — Medication 25 MG: at 10:13

## 2023-01-01 RX ADMIN — SODIUM BICARBONATE 650 MG TABLET 1300 MG: at 14:09

## 2023-01-01 RX ADMIN — Medication: at 05:59

## 2023-01-01 RX ADMIN — CARBIDOPA AND LEVODOPA 2.5 MG: 50; 200 TABLET, EXTENDED RELEASE ORAL at 16:27

## 2023-01-01 RX ADMIN — MIRTAZAPINE 15 MG: 7.5 TABLET, FILM COATED ORAL at 21:23

## 2023-01-01 RX ADMIN — MIDODRINE HYDROCHLORIDE 5 MG: 5 TABLET ORAL at 20:23

## 2023-01-01 RX ADMIN — MIDODRINE HYDROCHLORIDE 5 MG: 5 TABLET ORAL at 07:56

## 2023-01-01 RX ADMIN — FOLIC ACID 1 MG: 1 TABLET ORAL at 09:31

## 2023-01-01 RX ADMIN — Medication 1 TABLET: at 09:11

## 2023-01-01 RX ADMIN — MIDODRINE HYDROCHLORIDE 5 MG: 5 TABLET ORAL at 10:34

## 2023-01-01 RX ADMIN — APIXABAN 2.5 MG: 2.5 TABLET, FILM COATED ORAL at 08:45

## 2023-01-01 RX ADMIN — FOLIC ACID 1 MG: 1 TABLET ORAL at 08:02

## 2023-01-01 RX ADMIN — FOLIC ACID 1 MG: 1 TABLET ORAL at 07:55

## 2023-01-01 RX ADMIN — Medication 12.5 MG: at 19:35

## 2023-01-01 RX ADMIN — THIAMINE HCL TAB 100 MG 100 MG: 100 TAB at 08:30

## 2023-01-01 RX ADMIN — GABAPENTIN 300 MG: 300 CAPSULE ORAL at 19:35

## 2023-01-01 RX ADMIN — MIRTAZAPINE 15 MG: 7.5 TABLET, FILM COATED ORAL at 22:57

## 2023-01-01 RX ADMIN — THIAMINE HCL TAB 100 MG 100 MG: 100 TAB at 07:55

## 2023-01-01 RX ADMIN — ACETAMINOPHEN 650 MG: 325 TABLET, FILM COATED ORAL at 10:28

## 2023-01-01 RX ADMIN — APIXABAN 2.5 MG: 2.5 TABLET, FILM COATED ORAL at 20:23

## 2023-01-01 RX ADMIN — APIXABAN 2.5 MG: 2.5 TABLET, FILM COATED ORAL at 07:56

## 2023-01-01 RX ADMIN — CARBIDOPA AND LEVODOPA 2.5 MG: 50; 200 TABLET, EXTENDED RELEASE ORAL at 08:18

## 2023-01-01 RX ADMIN — MIDODRINE HYDROCHLORIDE 5 MG: 5 TABLET ORAL at 07:37

## 2023-01-01 RX ADMIN — MIRTAZAPINE 15 MG: 7.5 TABLET, FILM COATED ORAL at 22:29

## 2023-01-01 RX ADMIN — Medication 25 MG: at 09:31

## 2023-01-01 RX ADMIN — SODIUM CHLORIDE, POTASSIUM CHLORIDE, SODIUM LACTATE AND CALCIUM CHLORIDE 250 ML: 600; 310; 30; 20 INJECTION, SOLUTION INTRAVENOUS at 16:43

## 2023-01-01 RX ADMIN — Medication 25 MG: at 07:38

## 2023-01-01 RX ADMIN — OLANZAPINE 2.5 MG: 10 INJECTION, POWDER, FOR SOLUTION INTRAMUSCULAR at 01:43

## 2023-01-01 RX ADMIN — Medication 25 MG: at 09:10

## 2023-01-01 RX ADMIN — PIPERACILLIN SODIUM AND TAZOBACTAM SODIUM 2.25 G: 2; .25 INJECTION, POWDER, LYOPHILIZED, FOR SOLUTION INTRAVENOUS at 21:35

## 2023-01-01 RX ADMIN — APIXABAN 2.5 MG: 2.5 TABLET, FILM COATED ORAL at 10:10

## 2023-01-01 RX ADMIN — Medication 5 MG: at 22:57

## 2023-01-01 RX ADMIN — APIXABAN 2.5 MG: 2.5 TABLET, FILM COATED ORAL at 08:17

## 2023-01-01 RX ADMIN — CARBIDOPA AND LEVODOPA 2.5 MG: 50; 200 TABLET, EXTENDED RELEASE ORAL at 09:10

## 2023-01-01 RX ADMIN — DEXTROSE MONOHYDRATE: 50 INJECTION, SOLUTION INTRAVENOUS at 03:33

## 2023-01-01 RX ADMIN — SODIUM CHLORIDE, POTASSIUM CHLORIDE, SODIUM LACTATE AND CALCIUM CHLORIDE 1000 ML: 600; 310; 30; 20 INJECTION, SOLUTION INTRAVENOUS at 22:16

## 2023-01-01 RX ADMIN — CARBIDOPA AND LEVODOPA 2.5 MG: 50; 200 TABLET, EXTENDED RELEASE ORAL at 10:16

## 2023-01-01 RX ADMIN — Medication 5 MG: at 20:11

## 2023-01-01 RX ADMIN — DEXTROSE MONOHYDRATE: 50 INJECTION, SOLUTION INTRAVENOUS at 13:02

## 2023-01-01 RX ADMIN — APIXABAN 2.5 MG: 2.5 TABLET, FILM COATED ORAL at 21:15

## 2023-01-01 RX ADMIN — APIXABAN 2.5 MG: 2.5 TABLET, FILM COATED ORAL at 21:23

## 2023-01-01 RX ADMIN — SODIUM BICARBONATE 650 MG TABLET 1300 MG: at 14:57

## 2023-01-01 RX ADMIN — CARBIDOPA AND LEVODOPA 2.5 MG: 50; 200 TABLET, EXTENDED RELEASE ORAL at 09:31

## 2023-01-01 RX ADMIN — MIRTAZAPINE 15 MG: 7.5 TABLET, FILM COATED ORAL at 23:50

## 2023-01-01 RX ADMIN — ALBUTEROL SULFATE 2.5 MG: 2.5 SOLUTION RESPIRATORY (INHALATION) at 08:29

## 2023-01-01 RX ADMIN — ACETAMINOPHEN 650 MG: 325 TABLET, FILM COATED ORAL at 09:27

## 2023-01-01 RX ADMIN — AZITHROMYCIN 500 MG: 250 TABLET, FILM COATED ORAL at 15:55

## 2023-01-01 RX ADMIN — Medication 1 TABLET: at 08:30

## 2023-01-01 RX ADMIN — TRIAMCINOLONE ACETONIDE: 1 OINTMENT TOPICAL at 20:16

## 2023-01-01 RX ADMIN — Medication 5 MG: at 23:50

## 2023-01-01 RX ADMIN — PIPERACILLIN SODIUM AND TAZOBACTAM SODIUM 2.25 G: 2; .25 INJECTION, POWDER, LYOPHILIZED, FOR SOLUTION INTRAVENOUS at 21:54

## 2023-01-01 RX ADMIN — Medication 25 MG: at 08:17

## 2023-01-01 RX ADMIN — APIXABAN 2.5 MG: 2.5 TABLET, FILM COATED ORAL at 09:27

## 2023-01-01 RX ADMIN — Medication 25 MG: at 08:20

## 2023-01-01 RX ADMIN — CARBIDOPA AND LEVODOPA 2.5 MG: 50; 200 TABLET, EXTENDED RELEASE ORAL at 17:58

## 2023-01-01 RX ADMIN — OLANZAPINE 2.5 MG: 10 INJECTION, POWDER, FOR SOLUTION INTRAMUSCULAR at 06:50

## 2023-01-01 RX ADMIN — Medication 2.5 MG: at 16:37

## 2023-01-01 RX ADMIN — Medication 25 MG: at 08:29

## 2023-01-01 RX ADMIN — MIRTAZAPINE 15 MG: 7.5 TABLET, FILM COATED ORAL at 21:15

## 2023-01-01 RX ADMIN — THIAMINE HCL TAB 100 MG 100 MG: 100 TAB at 08:02

## 2023-01-01 RX ADMIN — Medication: at 23:50

## 2023-01-01 RX ADMIN — MIDODRINE HYDROCHLORIDE 5 MG: 5 TABLET ORAL at 19:35

## 2023-01-01 RX ADMIN — APIXABAN 2.5 MG: 2.5 TABLET, FILM COATED ORAL at 08:29

## 2023-01-01 RX ADMIN — SODIUM BICARBONATE 650 MG TABLET 1300 MG: at 21:23

## 2023-01-01 RX ADMIN — Medication 5 MG: at 21:35

## 2023-01-01 RX ADMIN — MIDODRINE HYDROCHLORIDE 5 MG: 5 TABLET ORAL at 21:15

## 2023-01-01 RX ADMIN — AMOXICILLIN AND CLAVULANATE POTASSIUM 1 TABLET: 500; 125 TABLET, FILM COATED ORAL at 11:04

## 2023-01-01 RX ADMIN — SODIUM CHLORIDE 1000 ML: 9 INJECTION, SOLUTION INTRAVENOUS at 09:30

## 2023-01-01 RX ADMIN — MIDODRINE HYDROCHLORIDE 5 MG: 5 TABLET ORAL at 08:02

## 2023-01-01 RX ADMIN — PIPERACILLIN SODIUM AND TAZOBACTAM SODIUM 2.25 G: 2; .25 INJECTION, POWDER, LYOPHILIZED, FOR SOLUTION INTRAVENOUS at 03:33

## 2023-01-01 RX ADMIN — APIXABAN 2.5 MG: 2.5 TABLET, FILM COATED ORAL at 19:41

## 2023-01-01 RX ADMIN — Medication 12.5 MG: at 08:20

## 2023-01-01 RX ADMIN — SODIUM CHLORIDE, POTASSIUM CHLORIDE, SODIUM LACTATE AND CALCIUM CHLORIDE: 600; 310; 30; 20 INJECTION, SOLUTION INTRAVENOUS at 17:32

## 2023-01-01 RX ADMIN — THIAMINE HCL TAB 100 MG 100 MG: 100 TAB at 09:31

## 2023-01-01 RX ADMIN — TRIAMCINOLONE ACETONIDE: 1 OINTMENT TOPICAL at 09:31

## 2023-01-01 RX ADMIN — APIXABAN 2.5 MG: 2.5 TABLET, FILM COATED ORAL at 20:19

## 2023-01-01 RX ADMIN — PIPERACILLIN SODIUM AND TAZOBACTAM SODIUM 2.25 G: 2; .25 INJECTION, POWDER, LYOPHILIZED, FOR SOLUTION INTRAVENOUS at 04:16

## 2023-01-01 RX ADMIN — Medication 1 TABLET: at 07:55

## 2023-01-01 RX ADMIN — Medication 5 MG: at 21:48

## 2023-01-01 RX ADMIN — PIPERACILLIN SODIUM AND TAZOBACTAM SODIUM 2.25 G: 2; .25 INJECTION, POWDER, LYOPHILIZED, FOR SOLUTION INTRAVENOUS at 10:48

## 2023-01-01 RX ADMIN — SODIUM BICARBONATE 650 MG TABLET 1300 MG: at 20:05

## 2023-01-01 RX ADMIN — Medication 12.5 MG: at 07:56

## 2023-01-01 RX ADMIN — MIDODRINE HYDROCHLORIDE 5 MG: 5 TABLET ORAL at 19:56

## 2023-01-01 RX ADMIN — SODIUM CHLORIDE, POTASSIUM CHLORIDE, SODIUM LACTATE AND CALCIUM CHLORIDE 1000 ML: 600; 310; 30; 20 INJECTION, SOLUTION INTRAVENOUS at 22:18

## 2023-01-01 RX ADMIN — PIPERACILLIN SODIUM AND TAZOBACTAM SODIUM 4.5 G: 4; .5 INJECTION, POWDER, LYOPHILIZED, FOR SOLUTION INTRAVENOUS at 12:16

## 2023-01-01 RX ADMIN — MIDODRINE HYDROCHLORIDE 5 MG: 5 TABLET ORAL at 08:28

## 2023-01-01 RX ADMIN — MIRTAZAPINE 15 MG: 7.5 TABLET, FILM COATED ORAL at 21:35

## 2023-01-01 RX ADMIN — Medication 5 MG: at 21:59

## 2023-01-01 RX ADMIN — ACETAMINOPHEN 650 MG: 325 TABLET, FILM COATED ORAL at 22:09

## 2023-01-01 RX ADMIN — THIAMINE HCL TAB 100 MG 100 MG: 100 TAB at 08:47

## 2023-01-01 RX ADMIN — Medication 1 TABLET: at 07:34

## 2023-01-01 RX ADMIN — Medication 1 TABLET: at 09:31

## 2023-01-01 RX ADMIN — Medication: at 15:56

## 2023-01-01 RX ADMIN — DEXTROSE MONOHYDRATE: 50 INJECTION, SOLUTION INTRAVENOUS at 12:56

## 2023-01-01 RX ADMIN — SODIUM BICARBONATE 650 MG TABLET 1300 MG: at 09:11

## 2023-01-01 RX ADMIN — CEFTRIAXONE SODIUM 2 G: 2 INJECTION, POWDER, FOR SOLUTION INTRAMUSCULAR; INTRAVENOUS at 15:55

## 2023-01-01 RX ADMIN — APIXABAN 2.5 MG: 2.5 TABLET, FILM COATED ORAL at 08:18

## 2023-01-01 RX ADMIN — APIXABAN 2.5 MG: 2.5 TABLET, FILM COATED ORAL at 20:21

## 2023-01-01 RX ADMIN — ALBUTEROL SULFATE 2.5 MG: 2.5 SOLUTION RESPIRATORY (INHALATION) at 21:17

## 2023-01-01 RX ADMIN — Medication 12.5 MG: at 08:17

## 2023-01-01 RX ADMIN — APIXABAN 2.5 MG: 2.5 TABLET, FILM COATED ORAL at 08:02

## 2023-01-01 RX ADMIN — GABAPENTIN 300 MG: 300 CAPSULE ORAL at 07:55

## 2023-01-01 RX ADMIN — Medication 5 MG: at 21:54

## 2023-01-01 RX ADMIN — PIPERACILLIN AND TAZOBACTAM 3.38 G: 3; .375 INJECTION, POWDER, LYOPHILIZED, FOR SOLUTION INTRAVENOUS at 04:21

## 2023-01-01 RX ADMIN — ALBUTEROL SULFATE 2.5 MG: 2.5 SOLUTION RESPIRATORY (INHALATION) at 20:41

## 2023-01-01 RX ADMIN — AZITHROMYCIN 500 MG: 250 TABLET, FILM COATED ORAL at 07:56

## 2023-01-01 RX ADMIN — MIRTAZAPINE 15 MG: 7.5 TABLET, FILM COATED ORAL at 21:59

## 2023-01-01 RX ADMIN — MIRTAZAPINE 15 MG: 7.5 TABLET, FILM COATED ORAL at 21:54

## 2023-01-01 RX ADMIN — THIAMINE HCL TAB 100 MG 100 MG: 100 TAB at 08:18

## 2023-01-01 RX ADMIN — Medication 25 MG: at 08:46

## 2023-01-01 RX ADMIN — VANCOMYCIN HYDROCHLORIDE 1500 MG: 10 INJECTION, POWDER, LYOPHILIZED, FOR SOLUTION INTRAVENOUS at 22:57

## 2023-01-01 RX ADMIN — SODIUM BICARBONATE 650 MG TABLET 1300 MG: at 10:15

## 2023-01-01 RX ADMIN — Medication 1 TABLET: at 08:18

## 2023-01-01 RX ADMIN — Medication 1 TABLET: at 08:45

## 2023-01-01 RX ADMIN — Medication 25 MG: at 07:56

## 2023-01-01 RX ADMIN — MIRTAZAPINE 15 MG: 7.5 TABLET, FILM COATED ORAL at 21:49

## 2023-01-01 RX ADMIN — FOLIC ACID 1 MG: 1 TABLET ORAL at 09:11

## 2023-01-01 RX ADMIN — AMOXICILLIN AND CLAVULANATE POTASSIUM 1 TABLET: 500; 125 TABLET, FILM COATED ORAL at 21:15

## 2023-01-01 RX ADMIN — Medication 5 MG: at 21:15

## 2023-01-01 RX ADMIN — FOLIC ACID 1 MG: 1 TABLET ORAL at 07:38

## 2023-01-01 RX ADMIN — ALBUTEROL SULFATE 2.5 MG: 2.5 SOLUTION RESPIRATORY (INHALATION) at 08:19

## 2023-01-01 RX ADMIN — MIDODRINE HYDROCHLORIDE 5 MG: 5 TABLET ORAL at 08:17

## 2023-01-01 RX ADMIN — Medication 25 MG: at 09:27

## 2023-01-01 RX ADMIN — MIDODRINE HYDROCHLORIDE 5 MG: 5 TABLET ORAL at 08:21

## 2023-01-01 RX ADMIN — MIDODRINE HYDROCHLORIDE 5 MG: 5 TABLET ORAL at 19:41

## 2023-01-01 RX ADMIN — FOLIC ACID 1 MG: 1 TABLET ORAL at 08:45

## 2023-01-01 RX ADMIN — Medication 12.5 MG: at 19:56

## 2023-01-01 RX ADMIN — THIAMINE HCL TAB 100 MG 100 MG: 100 TAB at 09:11

## 2023-01-01 RX ADMIN — SODIUM BICARBONATE 650 MG TABLET 1300 MG: at 15:02

## 2023-01-01 RX ADMIN — PIPERACILLIN AND TAZOBACTAM 3.38 G: 3; .375 INJECTION, POWDER, LYOPHILIZED, FOR SOLUTION INTRAVENOUS at 17:36

## 2023-01-01 RX ADMIN — ACETAMINOPHEN 650 MG: 325 TABLET, FILM COATED ORAL at 03:22

## 2023-01-01 RX ADMIN — MIDODRINE HYDROCHLORIDE 5 MG: 5 TABLET ORAL at 20:08

## 2023-01-01 RX ADMIN — HYDROXYZINE HYDROCHLORIDE 25 MG: 25 TABLET, FILM COATED ORAL at 04:58

## 2023-01-01 RX ADMIN — Medication 12.5 MG: at 20:05

## 2023-01-01 RX ADMIN — MIRTAZAPINE 15 MG: 7.5 TABLET, FILM COATED ORAL at 21:48

## 2023-01-01 RX ADMIN — TRIAMCINOLONE ACETONIDE: 1 OINTMENT TOPICAL at 21:24

## 2023-01-01 RX ADMIN — Medication 12.5 MG: at 19:41

## 2023-01-01 RX ADMIN — DEXTROSE MONOHYDRATE: 50 INJECTION, SOLUTION INTRAVENOUS at 17:29

## 2023-01-01 RX ADMIN — SODIUM CHLORIDE, POTASSIUM CHLORIDE, SODIUM LACTATE AND CALCIUM CHLORIDE 1000 ML: 600; 310; 30; 20 INJECTION, SOLUTION INTRAVENOUS at 22:08

## 2023-01-01 RX ADMIN — Medication 5 MG: at 21:49

## 2023-01-01 RX ADMIN — PIPERACILLIN SODIUM AND TAZOBACTAM SODIUM 2.25 G: 2; .25 INJECTION, POWDER, LYOPHILIZED, FOR SOLUTION INTRAVENOUS at 17:25

## 2023-01-01 RX ADMIN — SODIUM BICARBONATE 650 MG TABLET 1300 MG: at 14:52

## 2023-01-01 RX ADMIN — CEFTRIAXONE SODIUM 2 G: 2 INJECTION, POWDER, FOR SOLUTION INTRAMUSCULAR; INTRAVENOUS at 16:16

## 2023-01-01 RX ADMIN — SODIUM CHLORIDE 500 ML: 9 INJECTION, SOLUTION INTRAVENOUS at 23:18

## 2023-01-01 RX ADMIN — FOLIC ACID 1 MG: 1 TABLET ORAL at 08:18

## 2023-01-01 RX ADMIN — SODIUM BICARBONATE 650 MG TABLET 1300 MG: at 20:19

## 2023-01-01 RX ADMIN — PIPERACILLIN SODIUM AND TAZOBACTAM SODIUM 2.25 G: 2; .25 INJECTION, POWDER, LYOPHILIZED, FOR SOLUTION INTRAVENOUS at 10:30

## 2023-01-01 RX ADMIN — ACETAMINOPHEN 650 MG: 325 TABLET, FILM COATED ORAL at 08:45

## 2023-01-01 RX ADMIN — APIXABAN 2.5 MG: 2.5 TABLET, FILM COATED ORAL at 20:39

## 2023-01-01 RX ADMIN — MIRTAZAPINE 15 MG: 7.5 TABLET, FILM COATED ORAL at 20:11

## 2023-01-01 RX ADMIN — PIPERACILLIN AND TAZOBACTAM 3.38 G: 3; .375 INJECTION, POWDER, LYOPHILIZED, FOR SOLUTION INTRAVENOUS at 22:57

## 2023-01-01 RX ADMIN — MIRTAZAPINE 15 MG: 7.5 TABLET, FILM COATED ORAL at 22:15

## 2023-01-01 RX ADMIN — APIXABAN 2.5 MG: 2.5 TABLET, FILM COATED ORAL at 20:05

## 2023-01-01 ASSESSMENT — ACTIVITIES OF DAILY LIVING (ADL)
ADLS_ACUITY_SCORE: 52
ADLS_ACUITY_SCORE: 60
ADLS_ACUITY_SCORE: 60
ADLS_ACUITY_SCORE: 41
ADLS_ACUITY_SCORE: 60
ADLS_ACUITY_SCORE: 41
ADLS_ACUITY_SCORE: 39
ADLS_ACUITY_SCORE: 52
ADLS_ACUITY_SCORE: 62
ADLS_ACUITY_SCORE: 64
ADLS_ACUITY_SCORE: 60
ADLS_ACUITY_SCORE: 65
ADLS_ACUITY_SCORE: 35
ADLS_ACUITY_SCORE: 56
ADLS_ACUITY_SCORE: 60
ADLS_ACUITY_SCORE: 41
ADLS_ACUITY_SCORE: 60
ADLS_ACUITY_SCORE: 60
ADLS_ACUITY_SCORE: 62
ADLS_ACUITY_SCORE: 60
ADLS_ACUITY_SCORE: 61
ADLS_ACUITY_SCORE: 62
ADLS_ACUITY_SCORE: 41
ADLS_ACUITY_SCORE: 39
ADLS_ACUITY_SCORE: 40
ADLS_ACUITY_SCORE: 56
ADLS_ACUITY_SCORE: 60
ADLS_ACUITY_SCORE: 60
ADLS_ACUITY_SCORE: 58
ADLS_ACUITY_SCORE: 60
ADLS_ACUITY_SCORE: 41
ADLS_ACUITY_SCORE: 60
ADLS_ACUITY_SCORE: 63
ADLS_ACUITY_SCORE: 60
ADLS_ACUITY_SCORE: 56
ADLS_ACUITY_SCORE: 64
ADLS_ACUITY_SCORE: 60
ADLS_ACUITY_SCORE: 56
ADLS_ACUITY_SCORE: 64
ADLS_ACUITY_SCORE: 60
ADLS_ACUITY_SCORE: 41
ADLS_ACUITY_SCORE: 60
ADLS_ACUITY_SCORE: 56
ADLS_ACUITY_SCORE: 58
ADLS_ACUITY_SCORE: 60
ADLS_ACUITY_SCORE: 56
ADLS_ACUITY_SCORE: 41
ADLS_ACUITY_SCORE: 41
ADLS_ACUITY_SCORE: 56
ADLS_ACUITY_SCORE: 63
ADLS_ACUITY_SCORE: 56
ADLS_ACUITY_SCORE: 52
ADLS_ACUITY_SCORE: 62
ADLS_ACUITY_SCORE: 41
ADLS_ACUITY_SCORE: 62
ADLS_ACUITY_SCORE: 62
ADLS_ACUITY_SCORE: 35
ADLS_ACUITY_SCORE: 58
ADLS_ACUITY_SCORE: 60
ADLS_ACUITY_SCORE: 63
ADLS_ACUITY_SCORE: 56
ADLS_ACUITY_SCORE: 62
ADLS_ACUITY_SCORE: 61
ADLS_ACUITY_SCORE: 60
ADLS_ACUITY_SCORE: 41
ADLS_ACUITY_SCORE: 60
ADLS_ACUITY_SCORE: 63
ADLS_ACUITY_SCORE: 67
ADLS_ACUITY_SCORE: 63
ADLS_ACUITY_SCORE: 56
ADLS_ACUITY_SCORE: 60
ADLS_ACUITY_SCORE: 56
ADLS_ACUITY_SCORE: 56
ADLS_ACUITY_SCORE: 61
ADLS_ACUITY_SCORE: 60
ADLS_ACUITY_SCORE: 52
ADLS_ACUITY_SCORE: 60
ADLS_ACUITY_SCORE: 64
ADLS_ACUITY_SCORE: 56
ADLS_ACUITY_SCORE: 39
ADLS_ACUITY_SCORE: 64
ADLS_ACUITY_SCORE: 60
ADLS_ACUITY_SCORE: 62
ADLS_ACUITY_SCORE: 63
ADLS_ACUITY_SCORE: 63
ADLS_ACUITY_SCORE: 41
ADLS_ACUITY_SCORE: 58
ADLS_ACUITY_SCORE: 64
ADLS_ACUITY_SCORE: 67
ADLS_ACUITY_SCORE: 60
ADLS_ACUITY_SCORE: 60
ADLS_ACUITY_SCORE: 63
ADLS_ACUITY_SCORE: 56
ADLS_ACUITY_SCORE: 64
ADLS_ACUITY_SCORE: 58
ADLS_ACUITY_SCORE: 60
ADLS_ACUITY_SCORE: 62
ADLS_ACUITY_SCORE: 63
ADLS_ACUITY_SCORE: 52
ADLS_ACUITY_SCORE: 64
ADLS_ACUITY_SCORE: 41
ADLS_ACUITY_SCORE: 60
ADLS_ACUITY_SCORE: 64
ADLS_ACUITY_SCORE: 60
ADLS_ACUITY_SCORE: 63
ADLS_ACUITY_SCORE: 63
ADLS_ACUITY_SCORE: 64
ADLS_ACUITY_SCORE: 41
ADLS_ACUITY_SCORE: 63
ADLS_ACUITY_SCORE: 60
ADLS_ACUITY_SCORE: 60
DEPENDENT_IADLS:: CLEANING;COOKING;LAUNDRY;SHOPPING;MEAL PREPARATION;MEDICATION MANAGEMENT;TRANSPORTATION;MONEY MANAGEMENT
ADLS_ACUITY_SCORE: 52
ADLS_ACUITY_SCORE: 65
ADLS_ACUITY_SCORE: 60
ADLS_ACUITY_SCORE: 60
ADLS_ACUITY_SCORE: 35
ADLS_ACUITY_SCORE: 56
ADLS_ACUITY_SCORE: 60
ADLS_ACUITY_SCORE: 58
ADLS_ACUITY_SCORE: 60
ADLS_ACUITY_SCORE: 64
ADLS_ACUITY_SCORE: 64
ADLS_ACUITY_SCORE: 41
ADLS_ACUITY_SCORE: 60
ADLS_ACUITY_SCORE: 60
ADLS_ACUITY_SCORE: 58
ADLS_ACUITY_SCORE: 60
ADLS_ACUITY_SCORE: 65
ADLS_ACUITY_SCORE: 56
ADLS_ACUITY_SCORE: 60
ADLS_ACUITY_SCORE: 62
ADLS_ACUITY_SCORE: 41
ADLS_ACUITY_SCORE: 67
ADLS_ACUITY_SCORE: 63
ADLS_ACUITY_SCORE: 63
ADLS_ACUITY_SCORE: 58
ADLS_ACUITY_SCORE: 63
ADLS_ACUITY_SCORE: 58
ADLS_ACUITY_SCORE: 35
ADLS_ACUITY_SCORE: 41
ADLS_ACUITY_SCORE: 41
ADLS_ACUITY_SCORE: 40
ADLS_ACUITY_SCORE: 60
ADLS_ACUITY_SCORE: 41
ADLS_ACUITY_SCORE: 56
ADLS_ACUITY_SCORE: 60
ADLS_ACUITY_SCORE: 63
ADLS_ACUITY_SCORE: 56
ADLS_ACUITY_SCORE: 60
ADLS_ACUITY_SCORE: 60

## 2023-01-26 NOTE — LETTER
"    1/26/2023        RE: Jc Cloud  [facility]  [address]  [facility] [address]        [facility] GERIATRICS    Chief Complaint   Patient presents with     RECHECK     HPI:  Jc Cloud is a 89 year old  (3/25/1931), who is being seen today for an episodic care visit at: THE [facility] ([facility]) [[id number]].   He moved to this facility 2/14/2022 for a higher level of care than could be managed at home. Medical history significant for CAD s/p CABG, afib, cardiomyopathy, second degree AV block, biventricular cardiac pacemaker, HTN, orthostatic hypotension, etoh abuse, neuropathy, depression, anxiety, insomnia, osteoarthritis, sleep apnea, essential tremor, cognitive impairment.  He was  hospitalized at [facility] 1/24-1/27/2022 after a fall at home. CXR showed acute appearing fractures of the left lateral ribs 7-9, chronic rib deformity of right 6th rib and suspected small left pneumothorax. He was noted to have a reddish stool in the ED and guaiac was positive. Hgb remained stable and no s/s of GI bleed. Pacemaker interrogation showed normal function. He discharged 1/27/2021 to [facility] tcu. Vitamin B12, folic acid and thiamine were started. Vitamin D was also started.  At tcu discharge, he was ambulating 300 ft with walker and supervision. Required supervision to stand by assist with cares. BIMS 14/15, SBT 2/28, MOCA 16/30.    Today's concern is:      Chronic a-fib (H)  Cardiac pacemaker in situ  Cardiomyopathy, unspecified type (H)  Coronary artery disease involving native heart without angina pectoris, unspecified vessel or lesion type  Stage 3a chronic kidney disease (H)  Episode of recurrent major depressive disorder, unspecified depression episode severity (H)  Primary hypertension  Orthostatic hypotension  Idiopathic peripheral neuropathy  Alcohol abuse  Cognitive impairment   He is a fair historian. Very pleasant and talkative. Reports feeling \"very " "well.\" Good appetite. Denies pain of any type. Ambulates with a walker when out of his apartment. Independent with cares. Completed home therapies last month. He tends to stay in his apartment, \" I'm comfortable with a solitary lifestyle.\" Denies feeling depressed and is sleeping well.   He had biopsy of a skin lesion of his left neck 12/15/2022 that was benign keratosis.       Allergies, and PMH/PSH reviewed in EPIC today    REVIEW OF SYSTEMS:  4 point ROS including Respiratory, CV, GI and , other than that noted in the HPI,  is negative    Objective:   /79   Pulse 86   Temp 97.6  F (36.4  C)   Resp 17   Wt 77.5 kg (170 lb 12.8 oz)   BMI 22.53 kg/m    GENERAL APPEARANCE:  Alert, in no distress    ENT:  [place], oropharynx clear  EYES:  conjunctiva and lids normal  NECK: no adenopathy or  thyromegaly  RESP:  lungs clear to auscultation   CV:  irregular  rhythm, no murmur, no LE edema   ABDOMEN:  soft, nontender, no distension, no  masses  M/S: up in chair.  WILKES with good strength. No joint inflammation   SKIN: no rashes or open areas   PSYCH:  oriented X 2-3, insight and judgement impaired, memory impaired, mood normal     Recent labs in [facility] reviewed by me today.     ASSESSMENT / PLAN:  (I48.20) Chronic a-fib (H)  (primary encounter diagnosis)  (Z95.0) Cardiac pacemaker in situ  Comment: rate controlled. Pacemaker generator was replaced 10/17/2022  Plan: continue metoprolol. Device check and follow up per [facility]    (I42.9) Cardiomyopathy, unspecified type (H)  (I25.10) Coronary artery disease involving native heart without angina pectoris, unspecified vessel or lesion type  Comment: no chest pain or acute symptoms   Plan: continue metoprolol     (N18.31) Stage 3a chronic kidney disease (H)  Comment: creatinine 1.57 on 10/7/2022, baseline   Plan: BMP at next visit. Avoid nephrotoxins. Renal dose meds     (F33.9) Episode of recurrent major depressive disorder, unspecified depression " episode severity (H)  Comment: mood has improved, sleeping well   He's had a desirable weight gain of almost 30 lbs since admission   Plan: continue mirtazapine and trazodone prn.     (I10) Primary hypertension  Orthostatic hypotension   Comment: BPs soft: 113/79, 120/63 . No recent falls   Sodium chloride tabs dc'd 9/2022  Plan: continue metoprolol and midodrine. Walker for mobility     (G60.9) Idiopathic peripheral neuropathy  Comment: chronic, managed   Plan: continue gabapentin, tylenol     (F10.10) Alcohol abuse  Comment: he has 1 shot at night. No intoxication or issues reported by staff   Plan: continue monitored alcohol use     (R41.89) Cognitive impairment  Comment: mild to moderate deficits, improved from time of AL admission   Plan: AL staff assistance with cares, meals, activities, med admin   Discussed with his son Kan Cloud, who agrees his father is doing well; he has no new concerns today.       Electronically signed by: MAGNOLIA Alanis CNP             Sincerely,        MAGNOLIA Alanis CNP

## 2023-01-26 NOTE — PROGRESS NOTES
"[facility] GERIATRICS    Chief Complaint   Patient presents with     RECHECK     HPI:  Jc Cloud is a 89 year old  (3/25/1931), who is being seen today for an episodic care visit at: THE [facility]) [[id number]].   He moved to this facility 2/14/2022 for a higher level of care than could be managed at home. Medical history significant for CAD s/p CABG, afib, cardiomyopathy, second degree AV block, biventricular cardiac pacemaker, HTN, orthostatic hypotension, etoh abuse, neuropathy, depression, anxiety, insomnia, osteoarthritis, sleep apnea, essential tremor, cognitive impairment.  He was  hospitalized at [facility] 1/24-1/27/2022 after a fall at home. CXR showed acute appearing fractures of the left lateral ribs 7-9, chronic rib deformity of right 6th rib and suspected small left pneumothorax. He was noted to have a reddish stool in the ED and guaiac was positive. Hgb remained stable and no s/s of GI bleed. Pacemaker interrogation showed normal function. He discharged 1/27/2021 to [facility] tcu. Vitamin B12, folic acid and thiamine were started. Vitamin D was also started.  At tcu discharge, he was ambulating 300 ft with walker and supervision. Required supervision to stand by assist with cares. BIMS 14/15, SBT 2/28, MOCA 16/30.    Today's concern is:      Chronic a-fib (H)  Cardiac pacemaker in situ  Cardiomyopathy, unspecified type (H)  Coronary artery disease involving native heart without angina pectoris, unspecified vessel or lesion type  Stage 3a chronic kidney disease (H)  Episode of recurrent major depressive disorder, unspecified depression episode severity (H)  Primary hypertension  Orthostatic hypotension  Idiopathic peripheral neuropathy  Alcohol abuse  Cognitive impairment   He is a fair historian. Very pleasant and talkative. Reports feeling \"very well.\" Good appetite. Denies pain of any type. Ambulates with a walker when out of his apartment. Independent with " "cares. Completed home therapies last month. He tends to stay in his apartment, \" I'm comfortable with a solitary lifestyle.\" Denies feeling depressed and is sleeping well.   He had biopsy of a skin lesion of his left neck 12/15/2022 that was benign keratosis.       Allergies, and PMH/PSH reviewed in EPIC today    REVIEW OF SYSTEMS:  4 point ROS including Respiratory, CV, GI and , other than that noted in the HPI,  is negative    Objective:   /79   Pulse 86   Temp 97.6  F (36.4  C)   Resp 17   Wt 77.5 kg (170 lb 12.8 oz)   BMI 22.53 kg/m    GENERAL APPEARANCE:  Alert, in no distress    ENT:  [place], oropharynx clear  EYES:  conjunctiva and lids normal  NECK: no adenopathy or  thyromegaly  RESP:  lungs clear to auscultation   CV:  irregular  rhythm, no murmur, no LE edema   ABDOMEN:  soft, nontender, no distension, no  masses  M/S: up in chair.  WILKES with good strength. No joint inflammation   SKIN: no rashes or open areas   PSYCH:  oriented X 2-3, insight and judgement impaired, memory impaired, mood normal     Recent labs in [facility] reviewed by me today.     ASSESSMENT / PLAN:  (I48.20) Chronic a-fib (H)  (primary encounter diagnosis)  (Z95.0) Cardiac pacemaker in situ  Comment: rate controlled. Pacemaker generator was replaced 10/17/2022  Plan: continue metoprolol. Device check and follow up per [facility]    (I42.9) Cardiomyopathy, unspecified type (H)  (I25.10) Coronary artery disease involving native heart without angina pectoris, unspecified vessel or lesion type  Comment: no chest pain or acute symptoms   Plan: continue metoprolol     (N18.31) Stage 3a chronic kidney disease (H)  Comment: creatinine 1.57 on 10/7/2022, baseline   Plan: BMP at next visit. Avoid nephrotoxins. Renal dose meds     (F33.9) Episode of recurrent major depressive disorder, unspecified depression episode severity (H)  Comment: mood has improved, sleeping well   He's had a desirable weight gain of almost 30 lbs since " admission   Plan: continue mirtazapine and trazodone prn.     (I10) Primary hypertension  Orthostatic hypotension   Comment: BPs soft: 113/79, 120/63 . No recent falls   Sodium chloride tabs dc'd 9/2022  Plan: continue metoprolol and midodrine. Walker for mobility     (G60.9) Idiopathic peripheral neuropathy  Comment: chronic, managed   Plan: continue gabapentin, tylenol     (F10.10) Alcohol abuse  Comment: he has 1 shot at night. No intoxication or issues reported by staff   Plan: continue monitored alcohol use     (R41.89) Cognitive impairment  Comment: mild to moderate deficits, improved from time of AL admission   Plan: AL staff assistance with cares, meals, activities, med admin   Discussed with his son Kan Cloud, who agrees his father is doing well; he has no new concerns today.       Electronically signed by: MAGNOLIA Alanis CNP

## 2023-03-30 NOTE — PROGRESS NOTES
"[facility] GERIATRICS    Chief Complaint   Patient presents with     RECHECK     HPI:  Jc Cloud is a 89 year old  (3/25/1931), who is being seen today for an episodic care visit at: THE [facility]) [[id number]].   He moved to this facility 2/14/2022 for a higher level of care than could be managed at home. Medical history significant for CAD s/p CABG, afib, cardiomyopathy, second degree AV block, biventricular cardiac pacemaker, HTN, orthostatic hypotension, etoh abuse, neuropathy, depression, anxiety, insomnia, osteoarthritis, sleep apnea, essential tremor, cognitive impairment.  He was  hospitalized at [facility] 1/24-1/27/2022 after a fall at home. CXR showed acute appearing fractures of the left lateral ribs 7-9, chronic rib deformity of right 6th rib and suspected small left pneumothorax. He was noted to have a reddish stool in the ED and guaiac was positive. Hgb remained stable and no s/s of GI bleed. Pacemaker interrogation showed normal function. [facility] Vitamin B12, folic acid and thiamine were started. Vitamin D was also started. BIMS 14/15, SBT 2/28, MOCA 16/30    Today's concern is:      Chronic a-fib (H)  Cardiac pacemaker in situ  Cardiomyopathy, unspecified type (H)  Coronary artery disease involving native heart without angina pectoris, unspecified vessel or lesion type  Stage 3a chronic kidney disease (H)  Primary hypertension  Orthostatic hypotension  Essential tremor  Depression, unspecified depression type  Cognitive impairment  Alcohol abuse   It's after 11 am and he's in bed in his pajamas. States that he was up and ate breakfast then felt like napping. Denies feeling ill. Reports his mood is \"very good.\" Denies pain of any type. Ambulates with a walker when out of his apartment. Independent with cares. Staff reports no issues.   Spoke with his son Kan Cloud, who has also found him in bed during the day. He's also " noticed his father's appetite is down, but hasn't noticed any signs of illness or pain.       Allergies, and PMH/PSH reviewed in EPIC today    REVIEW OF SYSTEMS:  4 point ROS including Respiratory, CV, GI and , other than that noted in the HPI,  is negative    Objective:   /77   Pulse 95   Temp 98  F (36.7  C)   Resp 18   Wt 78.5 kg (173 lb)   BMI 22.82 kg/m    GENERAL APPEARANCE:  Alert, in no distress    ENT:  [place], oropharynx clear  EYES:  conjunctiva and lids normal  NECK: no adenopathy or  thyromegaly  RESP:  lungs clear to auscultation   CV:  irregular  rhythm, no murmur, no LE edema   ABDOMEN:  soft, nontender, no distension, no  masses  M/S: in bed.   WILKES with good strength. No joint inflammation   SKIN: no rashes or open areas   PSYCH:  oriented X 2-3, insight and judgement impaired, memory impaired, mood normal     Recent labs in [facility] reviewed by me today.       ASSESSMENT / PLAN:  (I48.20) Chronic a-fib (H)  (primary encounter diagnosis)  (Z95.0) Cardiac pacemaker in situ  (I42.9) Cardiomyopathy, unspecified type (H)  Comment: rate controlled. Pacemaker generator was replaced 10/2022.   Plan: decrease metoprolol due to soft BPs and fall risk. Continue apixaban.  Cardiology follow up and device check per [facility]     (I25.10) Coronary artery disease involving native heart without angina pectoris, unspecified vessel or lesion type  Comment: no chest pain or acute symptoms   Plan: metoprolol as above.     (N18.31) Stage 3a chronic kidney disease (H)  Comment: creatinine 1.57 on 10/7/2023, baseline   Plan: BMP. Avoid nephrotoxins     (I10) Primary hypertension  (I95.1) Orthostatic hypotension  Comment: BPs soft: 107/77, 113/79  Sodium chloride  tabs were dc'd 9/2022  Plan: decrease metoprolol to 25 mg bid. Continue midodrine. CBC, BMP, magnesium     Essential tremor  Comment: chronic. Gabapentin may be contributing to sleepiness during the day  Plan: check labs. Continue current  dose gabapentin for now. Continue primidone.     (F32.A) Depression, unspecified depression type  Comment: he tends to isolate in his apartment, but does not appear depressed. Concern re: sleeping more during the day  Plan: continue mirtazapine and prn trazodone. Check labs to rule out contributing factors to daytime fatigue.     (R41.89) Cognitive impairment  Comment: mild to moderate deficits, poor short term memory.   Plan: AL staff assistance with cares,meals, activities, med admin     (F10.10) Alcohol abuse  Comment: has 1 shot at night. No episodes of intoxication  Plan: monitored alcohol use           Electronically signed by: MAGNOLIA Alanis CNP

## 2023-03-30 NOTE — LETTER
"    3/30/2023        RE: Jc Cloud  [facility]  [address]  [facility] [address]        [facility] GERIATRICS    Chief Complaint   Patient presents with     RECHECK     HPI:  Jc Cloud is a 89 year old  (3/25/1931), who is being seen today for an episodic care visit at: THE [facility] ([facility]) [[id number]].   He moved to this facility 2/14/2022 for a higher level of care than could be managed at home. Medical history significant for CAD s/p CABG, afib, cardiomyopathy, second degree AV block, biventricular cardiac pacemaker, HTN, orthostatic hypotension, etoh abuse, neuropathy, depression, anxiety, insomnia, osteoarthritis, sleep apnea, essential tremor, cognitive impairment.  He was  hospitalized at [facility] 1/24-1/27/2022 after a fall at home. CXR showed acute appearing fractures of the left lateral ribs 7-9, chronic rib deformity of right 6th rib and suspected small left pneumothorax. He was noted to have a reddish stool in the ED and guaiac was positive. Hgb remained stable and no s/s of GI bleed. Pacemaker interrogation showed normal function. He discharged 1/27/2021 to [facility]. Vitamin B12, folic acid and thiamine were started. Vitamin D was also started. BIMS 14/15, SBT 2/28, MOCA 16/30    Today's concern is:      Chronic a-fib (H)  Cardiac pacemaker in situ  Cardiomyopathy, unspecified type (H)  Coronary artery disease involving native heart without angina pectoris, unspecified vessel or lesion type  Stage 3a chronic kidney disease (H)  Primary hypertension  Orthostatic hypotension  Essential tremor  Depression, unspecified depression type  Cognitive impairment  Alcohol abuse   It's after 11 am and he's in bed in his pajamas. States that he was up and ate breakfast then felt like napping. Denies feeling ill. Reports his mood is \"very good.\" Denies pain of any type. Ambulates with a walker when out of his apartment. Independent with cares. Staff " reports no issues.   Spoke with his son Kan Cloud, who has also found him in bed during the day. He's also noticed his father's appetite is down, but hasn't noticed any signs of illness or pain.       Allergies, and PMH/PSH reviewed in EPIC today    REVIEW OF SYSTEMS:  4 point ROS including Respiratory, CV, GI and , other than that noted in the HPI,  is negative    Objective:   /77   Pulse 95   Temp 98  F (36.7  C)   Resp 18   Wt 78.5 kg (173 lb)   BMI 22.82 kg/m    GENERAL APPEARANCE:  Alert, in no distress    ENT:  [place], oropharynx clear  EYES:  conjunctiva and lids normal  NECK: no adenopathy or  thyromegaly  RESP:  lungs clear to auscultation   CV:  irregular  rhythm, no murmur, no LE edema   ABDOMEN:  soft, nontender, no distension, no  masses  M/S: in bed.   WILKES with good strength. No joint inflammation   SKIN: no rashes or open areas   PSYCH:  oriented X 2-3, insight and judgement impaired, memory impaired, mood normal     Recent labs in [facility] reviewed by me today.       ASSESSMENT / PLAN:  (I48.20) Chronic a-fib (H)  (primary encounter diagnosis)  (Z95.0) Cardiac pacemaker in situ  (I42.9) Cardiomyopathy, unspecified type (H)  Comment: rate controlled. Pacemaker generator was replaced 10/2022.   Plan: decrease metoprolol due to soft BPs and fall risk. Continue apixaban.  Cardiology follow up and device check per [facility] Cardiology     (I25.10) Coronary artery disease involving native heart without angina pectoris, unspecified vessel or lesion type  Comment: no chest pain or acute symptoms   Plan: metoprolol as above.     (N18.31) Stage 3a chronic kidney disease (H)  Comment: creatinine 1.57 on 10/7/2023, baseline   Plan: BMP. Avoid nephrotoxins     (I10) Primary hypertension  (I95.1) Orthostatic hypotension  Comment: BPs soft: 107/77, 113/79  Sodium chloride  tabs were dc'd 9/2022  Plan: decrease metoprolol to 25 mg bid. Continue midodrine. CBC, BMP, magnesium     Essential  tremor  Comment: chronic. Gabapentin may be contributing to sleepiness during the day  Plan: check labs. Continue current dose gabapentin for now. Continue primidone.     (F32.A) Depression, unspecified depression type  Comment: he tends to isolate in his apartment, but does not appear depressed. Concern re: sleeping more during the day  Plan: continue mirtazapine and prn trazodone. Check labs to rule out contributing factors to daytime fatigue.     (R41.89) Cognitive impairment  Comment: mild to moderate deficits, poor short term memory.   Plan: AL staff assistance with cares,meals, activities, med admin     (F10.10) Alcohol abuse  Comment: has 1 shot at night. No episodes of intoxication  Plan: monitored alcohol use           Electronically signed by: MAGNOLIA Alanis CNP           Sincerely,        MAGNOLIA Alanis CNP

## 2023-05-26 NOTE — PROGRESS NOTES
[facility] GERIATRICS    Chief Complaint   Patient presents with     RECHECK     HPI:  Jc Cloud is a 89 year old  (3/25/1931), who is being seen today for an episodic care visit at: THE [facility]) [[id number]].   He moved to this facility 2/14/2022 for a higher level of care than could be managed at home. Medical history significant for CAD s/p CABG, afib, cardiomyopathy, second degree AV block, biventricular cardiac pacemaker, HTN, orthostatic hypotension, etoh abuse, neuropathy, depression, anxiety, insomnia, osteoarthritis, sleep apnea, essential tremor, cognitive impairment.  He was  hospitalized at [facility] [place] 1/24-1/27/2022 after a fall at home. CXR showed acute appearing fractures of the left lateral ribs 7-9, chronic rib deformity of right 6th rib and suspected small left pneumothorax. He was noted to have a reddish stool in the ED and guaiac was positive. Hgb remained stable and no s/s of GI bleed. Pacemaker interrogation showed normal function. [facility]. Vitamin B12, folic acid and thiamine were started. Vitamin D was also started. BIMS 14/15, SBT 2/28, MOCA 16/30    Today's concern is:      Chronic a-fib (H)  Cardiac pacemaker in situ  Cardiomyopathy, unspecified type (H)  Coronary artery disease involving native heart without angina pectoris, unspecified vessel or lesion type  Stage 3a chronic kidney disease (H)  Primary hypertension  Orthostatic hypotension  Cognitive impairment  Depression, unspecified depression type  Alcohol abuse  He is resting in bed, fully alert and talkative. Reports he didn't get up for breakfast but plans to get up soon to eat. Denies feeling ill. Denies pain of any type. Reports good appetite. No recent weight but he doesn't think he has lost weight. He reports having 1 drink in the evening. Staff reports that he has been staying in bed more during the day. No intoxication noted by staff. Ambulates with a  walker. Independent with cares.     Allergies, and PMH/PSH reviewed in EPIC today    REVIEW OF SYSTEMS:  4 point ROS including Respiratory, CV, GI and , other than that noted in the HPI,  is negative    Objective:   /80   Pulse 59   Temp 98  F (36.7  C)   Resp 18   SpO2 93%    Exam unchanged   GENERAL APPEARANCE:  Alert, in no distress    ENT:  [place], oropharynx clear  EYES:  conjunctiva and lids normal  NECK: no adenopathy or  thyromegaly  RESP:  lungs clear to auscultation   CV:  irregular  rhythm, no murmur, no LE edema   ABDOMEN:  soft, nontender, no distension, no  masses  M/S: in bed.   WILKES with good strength. No joint inflammation   SKIN: no rashes or open areas   PSYCH:  oriented X 2-3, insight and judgement impaired, memory impaired, mood normal     Recent labs in [facility] reviewed by me today.     ASSESSMENT / PLAN:  (I48.20) Chronic a-fib (H)  (primary encounter diagnosis)  (Z95.0) Cardiac pacemaker in situ  Comment: metoprolol was decreased 3/30/2023 due to soft BPs. Rate controlled.  Pacemaker generator was replaced 10/2022  Plan: continue apixaban, metoprolol. Device check as scheduled. [facility]     (I42.9) Cardiomyopathy, unspecified type (H)  (I25.10) Coronary artery disease involving native heart without angina pectoris, unspecified vessel or lesion type  Comment: no chest pain or acute issues  Plan: continue metoprolol     CKD stage 3a  Comment: renal function at baseline with creatinine 1.55 on 4/6/2023  Plan: follow BMP. Avoid nephrotoxins. Renal dose meds     (I10) Primary hypertension  (I95.1) Orthostatic hypotension  Comment: /80 today. Other recent reading 107/77  Metoprolol was decreased 3/2023 due to soft BPs   Plan: continue metoprolol 25 mg bid, midodrine. Monitor VS and adjust meds as indicated     (R41.89) Cognitive impairment  (F32.A) Depression, unspecified depression type  (F10.10) Alcohol abuse  Comment: isolates and sleeping more during the  day. No s/s of acute illness. Question if he is drinking more than realized and/or cognitive decline. Staff has not witnessed any episodes of intoxication.   Plan: consider referral to OT for cognitive testing. Monitor alcohol use, if able.           Electronically signed by: MAGNOLIA Alanis CNP

## 2023-05-26 NOTE — LETTER
5/26/2023        RE: Jc Cloud  [facility]  [address]  [facility] [address]        [facility] GERIATRICS    Chief Complaint   Patient presents with     RECHECK     HPI:  Jc Cloud is a 89 year old  (3/25/1931), who is being seen today for an episodic care visit at: THE [facility] ([facility]) [[id number]].   He moved to this facility 2/14/2022 for a higher level of care than could be managed at home. Medical history significant for CAD s/p CABG, afib, cardiomyopathy, second degree AV block, biventricular cardiac pacemaker, HTN, orthostatic hypotension, etoh abuse, neuropathy, depression, anxiety, insomnia, osteoarthritis, sleep apnea, essential tremor, cognitive impairment.  He was  hospitalized at [facility] 1/24-1/27/2022 after a fall at home. CXR showed acute appearing fractures of the left lateral ribs 7-9, chronic rib deformity of right 6th rib and suspected small left pneumothorax. He was noted to have a reddish stool in the ED and guaiac was positive. Hgb remained stable and no s/s of GI bleed. Pacemaker interrogation showed normal function. He discharged 1/27/2021 to [facility]. Vitamin B12, folic acid and thiamine were started. Vitamin D was also started. BIMS 14/15, SBT 2/28, MOCA 16/30    Today's concern is:      Chronic a-fib (H)  Cardiac pacemaker in situ  Cardiomyopathy, unspecified type (H)  Coronary artery disease involving native heart without angina pectoris, unspecified vessel or lesion type  Stage 3a chronic kidney disease (H)  Primary hypertension  Orthostatic hypotension  Cognitive impairment  Depression, unspecified depression type  Alcohol abuse  He is resting in bed, fully alert and talkative. Reports he didn't get up for breakfast but plans to get up soon to eat. Denies feeling ill. Denies pain of any type. Reports good appetite. No recent weight but he doesn't think he has lost weight. He reports having 1 drink in the evening.  Staff reports that he has been staying in bed more during the day. No intoxication noted by staff. Ambulates with a walker. Independent with cares.     Allergies, and PMH/PSH reviewed in EPIC today    REVIEW OF SYSTEMS:  4 point ROS including Respiratory, CV, GI and , other than that noted in the HPI,  is negative    Objective:   /80   Pulse 59   Temp 98  F (36.7  C)   Resp 18   SpO2 93%    Exam unchanged   GENERAL APPEARANCE:  Alert, in no distress    ENT:  [place], oropharynx clear  EYES:  conjunctiva and lids normal  NECK: no adenopathy or  thyromegaly  RESP:  lungs clear to auscultation   CV:  irregular  rhythm, no murmur, no LE edema   ABDOMEN:  soft, nontender, no distension, no  masses  M/S: in bed.   WILKES with good strength. No joint inflammation   SKIN: no rashes or open areas   PSYCH:  oriented X 2-3, insight and judgement impaired, memory impaired, mood normal     Recent labs in [facility] reviewed by me today.     ASSESSMENT / PLAN:  (I48.20) Chronic a-fib (H)  (primary encounter diagnosis)  (Z95.0) Cardiac pacemaker in situ  Comment: metoprolol was decreased 3/30/2023 due to soft BPs. Rate controlled.  Pacemaker generator was replaced 10/2022  Plan: continue apixaban, metoprolol. Device check as scheduled. Followed by [facility] Cardiology.     (I42.9) Cardiomyopathy, unspecified type (H)  (I25.10) Coronary artery disease involving native heart without angina pectoris, unspecified vessel or lesion type  Comment: no chest pain or acute issues  Plan: continue metoprolol     CKD stage 3a  Comment: renal function at baseline with creatinine 1.55 on 4/6/2023  Plan: follow BMP. Avoid nephrotoxins. Renal dose meds     (I10) Primary hypertension  (I95.1) Orthostatic hypotension  Comment: /80 today. Other recent reading 107/77  Metoprolol was decreased 3/2023 due to soft BPs   Plan: continue metoprolol 25 mg bid, midodrine. Monitor VS and adjust meds as indicated     (R41.89) Cognitive  impairment  (F32.A) Depression, unspecified depression type  (F10.10) Alcohol abuse  Comment: isolates and sleeping more during the day. No s/s of acute illness. Question if he is drinking more than realized and/or cognitive decline. Staff has not witnessed any episodes of intoxication.   Plan: consider referral to OT for cognitive testing. Monitor alcohol use, if able.           Electronically signed by: MAGNOLIA Alanis CNP             Sincerely,        MAGNOLIA Alanis CNP

## 2023-06-27 ENCOUNTER — APPOINTMENT (RX ONLY)
Dept: URBAN - METROPOLITAN AREA CLINIC 146 | Facility: CLINIC | Age: 88
Setting detail: DERMATOLOGY
End: 2023-06-27

## 2023-06-27 DIAGNOSIS — L57.0 ACTINIC KERATOSIS: ICD-10-CM

## 2023-06-27 DIAGNOSIS — D18.0 HEMANGIOMA: ICD-10-CM

## 2023-06-27 DIAGNOSIS — Z12.83 ENCOUNTER FOR SCREENING FOR MALIGNANT NEOPLASM OF SKIN: ICD-10-CM

## 2023-06-27 DIAGNOSIS — L81.4 OTHER MELANIN HYPERPIGMENTATION: ICD-10-CM

## 2023-06-27 DIAGNOSIS — L82.1 OTHER SEBORRHEIC KERATOSIS: ICD-10-CM

## 2023-06-27 PROBLEM — D18.01 HEMANGIOMA OF SKIN AND SUBCUTANEOUS TISSUE: Status: ACTIVE | Noted: 2023-06-27

## 2023-06-27 PROCEDURE — 17000 DESTRUCT PREMALG LESION: CPT

## 2023-06-27 PROCEDURE — ? COUNSELING

## 2023-06-27 PROCEDURE — 99213 OFFICE O/P EST LOW 20 MIN: CPT | Mod: 25

## 2023-06-27 PROCEDURE — ? LIQUID NITROGEN

## 2023-06-27 PROCEDURE — ? RECOMMENDATIONS

## 2023-06-27 PROCEDURE — 17003 DESTRUCT PREMALG LES 2-14: CPT

## 2023-06-27 ASSESSMENT — LOCATION SIMPLE DESCRIPTION DERM
LOCATION SIMPLE: CHEST
LOCATION SIMPLE: RIGHT ANTERIOR NECK
LOCATION SIMPLE: ABDOMEN
LOCATION SIMPLE: LEFT SHOULDER
LOCATION SIMPLE: RIGHT FOREHEAD
LOCATION SIMPLE: RIGHT CHEEK
LOCATION SIMPLE: LEFT UPPER BACK
LOCATION SIMPLE: RIGHT ZYGOMA
LOCATION SIMPLE: LEFT CHEEK
LOCATION SIMPLE: LEFT ZYGOMA
LOCATION SIMPLE: LEFT ANTERIOR NECK
LOCATION SIMPLE: RIGHT UPPER BACK
LOCATION SIMPLE: RIGHT FOREARM
LOCATION SIMPLE: RIGHT SHOULDER
LOCATION SIMPLE: RIGHT EAR
LOCATION SIMPLE: POSTERIOR NECK

## 2023-06-27 ASSESSMENT — LOCATION DETAILED DESCRIPTION DERM
LOCATION DETAILED: LEFT SUPERIOR UPPER BACK
LOCATION DETAILED: RIGHT INFERIOR FOREHEAD
LOCATION DETAILED: RIGHT INFERIOR UPPER BACK
LOCATION DETAILED: RIGHT INFERIOR CENTRAL MALAR CHEEK
LOCATION DETAILED: LEFT CENTRAL MALAR CHEEK
LOCATION DETAILED: RIGHT LATERAL TRAPEZIAL NECK
LOCATION DETAILED: LEFT INFERIOR UPPER BACK
LOCATION DETAILED: LEFT MEDIAL ZYGOMA
LOCATION DETAILED: RIGHT POSTERIOR SHOULDER
LOCATION DETAILED: RIGHT SUPERIOR POSTERIOR HELIX
LOCATION DETAILED: RIGHT MEDIAL ZYGOMA
LOCATION DETAILED: EPIGASTRIC SKIN
LOCATION DETAILED: LEFT POSTERIOR SHOULDER
LOCATION DETAILED: RIGHT MID-UPPER BACK
LOCATION DETAILED: RIGHT DISTAL DORSAL FOREARM
LOCATION DETAILED: RIGHT SUPERIOR UPPER BACK
LOCATION DETAILED: LEFT MID-UPPER BACK
LOCATION DETAILED: RIGHT INFERIOR LATERAL FOREHEAD
LOCATION DETAILED: RIGHT MEDIAL MALAR CHEEK
LOCATION DETAILED: RIGHT ANTITRAGUS
LOCATION DETAILED: LEFT LATERAL TRAPEZIAL NECK
LOCATION DETAILED: RIGHT CLAVICULAR NECK
LOCATION DETAILED: RIGHT CENTRAL MALAR CHEEK
LOCATION DETAILED: XIPHOID
LOCATION DETAILED: RIGHT MEDIAL INFERIOR CHEST
LOCATION DETAILED: LEFT CLAVICULAR NECK

## 2023-06-27 ASSESSMENT — LOCATION ZONE DERM
LOCATION ZONE: ARM
LOCATION ZONE: TRUNK
LOCATION ZONE: NECK
LOCATION ZONE: EAR
LOCATION ZONE: FACE

## 2023-07-18 NOTE — PROGRESS NOTES
"[facility] GERIATRICS    Chief Complaint   Patient presents with    RECHECK     HPI:  Jc Cloud is a 89 year old  (3/25/1931), who is being seen today for an episodic care visit at: THE [facility]) [[id number]].   He moved to this facility 2/14/2022 for a higher level of care than could be managed at home. Medical history significant for CAD s/p CABG, afib, cardiomyopathy, second degree AV block, biventricular cardiac pacemaker, HTN, orthostatic hypotension, etoh abuse, neuropathy, depression, anxiety, insomnia, osteoarthritis, sleep apnea, essential tremor, cognitive impairment.  He was  hospitalized at [facility] 1/24-1/27/2022 after a fall at home. CXR showed acute appearing fractures of the left lateral ribs 7-9, chronic rib deformity of right 6th rib and suspected small left pneumothorax. He was noted to have a reddish stool in the ED and guaiac was positive. Hgb remained stable and no s/s of GI bleed. Pacemaker interrogation showed normal function. [facility] Vitamin B12, folic acid and thiamine were started. Vitamin D was also started. BIMS 14/15, SBT 2/28, MOCA 16/30    Today's concern is:      Chronic a-fib (H)  Cardiac pacemaker in situ  Cardiomyopathy, unspecified type (H)  Coronary artery disease involving native heart without angina pectoris, unspecified vessel or lesion type  Stage 3a chronic kidney disease (H)  Primary hypertension  Orthostatic hypotension  Essential tremor  Depression, unspecified depression type  Alcohol abuse  Cognitive impairment  Fecal smearing  He is up and dressed today, working on the crossword puzzle. Reports feeling well. Good appetite. Denies pain. Sleeping well. He would like to return to his house and asks if I can \"write a discharge order.\" \"My kids wouldn't like that though.\" States that he has 1 drink in the evening. Ambulates with a walker. No falls. Independent with cares. No issues reported by " staff.   Spoke with his son Kan, who reports that family has found linen smeared with stool on more than one occasion. They have also noticed urine on the floor around the toilet. They've added extra housekeeping services but patient has refused help with daily cares. Family also has to remind him to wear his pendant to call for help if needed.       Allergies, and PMH/PSH reviewed in EPIC today    REVIEW OF SYSTEMS:  4 point ROS including Respiratory, CV, GI and , other than that noted in the HPI,  is negative    Objective:   /80   Pulse 77   Temp 98  F (36.7  C)   Resp 18   Wt 78.5 kg (173 lb)   SpO2 92%   BMI 22.82 kg/m    GENERAL APPEARANCE:  Alert, in no distress    ENT:  [place], oropharynx clear  EYES:  conjunctiva and lids normal  NECK: no adenopathy or thyromegaly  RESP:  lungs clear to auscultation   CV:  irregular rhythm, no murmur, no LE edema   ABDOMEN:  soft, nontender, no distension, no  masses  M/S:  gait steady.  WILKES with good strength. No joint inflammation   SKIN: no rashes or open areas   PSYCH:  oriented X 2-3, insight and judgement impaired, memory impaired, mood normal     Recent labs in [facility] reviewed by me today.       ASSESSMENT / PLAN:  (I48.20) Chronic a-fib (H)  (primary encounter diagnosis)  (Z95.0) Cardiac pacemaker in situ  Comment: rate controlled: 77-95. Metoprolol was decreased 3/2023 due to soft BPs.   Plan: continue apixaban 2.5 mg bid, metoprolol ER 25 mg bid. Cardiology follow up and pacemaker check as scheduled.     (I42.9) Cardiomyopathy, unspecified type (H)  (I25.10) Coronary artery disease involving native heart without angina pectoris, unspecified vessel or lesion type  Comment: no acute symptoms,  appears euvolemic   Plan: continue metoprolol. Follow up with Cardiology.     (N18.31) Stage 3a chronic kidney disease (H)  Comment: creatinine 1.55 on 4/6/2023, baseline   Plan: BMP. Avoid nephrotoxins. Renal dose meds     (I10) Primary hypertension  (I95.1)  "Orthostatic hypotension  Comment: /80 today. Other recent readin/77   Metoprolol decreased 3/2023 due to soft BPs  Plan: continue metoprolol for rate control, continue midodrine.     (G25.0) Essential tremor  Comment: chronic  Plan: continue primidone and gabapentin     (F32.A) Depression, unspecified depression type  Comment: pleasant and in good spirits today  Plan: continue mirtazapine     (F10.10) Alcohol abuse  Comment: has 1 drink every evening, \"very large drink\" per son. No intoxication noted by staff.   Plan: monitor alcohol use    (R41.89) Cognitive impairment  Comment: gradual decline in cognition and functional status, consistent with dementia. He's alert and talkative today.   Plan: AL staff assistance with cares, meals, activities, med admin.     (R15.1) Fecal smearing  Comment: noted by family. Most likely related to difficulty managing self cares. Unclear if he's having loose stools   Plan: discontinue magnesium. Continue miralax. May require more assistance with cares, which he has so far resisted         Electronically signed by: MAGNOLIA Alanis CNP       "

## 2023-07-18 NOTE — LETTER
"    7/18/2023        RE: Jc Cloud  [facility]  [address]  [facility] [address]        [facility] GERIATRICS    Chief Complaint   Patient presents with     RECHECK     HPI:  Jc Cloud is a 89 year old  (3/25/1931), who is being seen today for an episodic care visit at: THE [facility] ([facility]) [[id number]].   He moved to this facility 2/14/2022 for a higher level of care than could be managed at home. Medical history significant for CAD s/p CABG, afib, cardiomyopathy, second degree AV block, biventricular cardiac pacemaker, HTN, orthostatic hypotension, etoh abuse, neuropathy, depression, anxiety, insomnia, osteoarthritis, sleep apnea, essential tremor, cognitive impairment.  He was  hospitalized at [facility] 1/24-1/27/2022 after a fall at home. CXR showed acute appearing fractures of the left lateral ribs 7-9, chronic rib deformity of right 6th rib and suspected small left pneumothorax. He was noted to have a reddish stool in the ED and guaiac was positive. Hgb remained stable and no s/s of GI bleed. Pacemaker interrogation showed normal function. He discharged 1/27/2021 to [facility]. Vitamin B12, folic acid and thiamine were started. Vitamin D was also started. BIMS 14/15, SBT 2/28, MOCA 16/30    Today's concern is:      Chronic a-fib (H)  Cardiac pacemaker in situ  Cardiomyopathy, unspecified type (H)  Coronary artery disease involving native heart without angina pectoris, unspecified vessel or lesion type  Stage 3a chronic kidney disease (H)  Primary hypertension  Orthostatic hypotension  Essential tremor  Depression, unspecified depression type  Alcohol abuse  Cognitive impairment  Fecal smearing  He is up and dressed today, working on the crossword puzzle. Reports feeling well. Good appetite. Denies pain. Sleeping well. He would like to return to his house and asks if I can \"write a discharge order.\" \"My kids wouldn't like that though.\" States that " he has 1 drink in the evening. Ambulates with a walker. No falls. Independent with cares. No issues reported by staff.   Spoke with his son Kan, who reports that family has found linen smeared with stool on more than one occasion. They have also noticed urine on the floor around the toilet. They've added extra housekeeping services but patient has refused help with daily cares. Family also has to remind him to wear his pendant to call for help if needed.       Allergies, and PMH/PSH reviewed in EPIC today    REVIEW OF SYSTEMS:  4 point ROS including Respiratory, CV, GI and , other than that noted in the HPI,  is negative    Objective:   /80   Pulse 77   Temp 98  F (36.7  C)   Resp 18   Wt 78.5 kg (173 lb)   SpO2 92%   BMI 22.82 kg/m    GENERAL APPEARANCE:  Alert, in no distress    ENT:  [place], oropharynx clear  EYES:  conjunctiva and lids normal  NECK: no adenopathy or thyromegaly  RESP:  lungs clear to auscultation   CV:  irregular rhythm, no murmur, no LE edema   ABDOMEN:  soft, nontender, no distension, no  masses  M/S:  gait steady.  WILKES with good strength. No joint inflammation   SKIN: no rashes or open areas   PSYCH:  oriented X 2-3, insight and judgement impaired, memory impaired, mood normal     Recent labs in [facility] reviewed by me today.       ASSESSMENT / PLAN:  (I48.20) Chronic a-fib (H)  (primary encounter diagnosis)  (Z95.0) Cardiac pacemaker in situ  Comment: rate controlled: 77-95. Metoprolol was decreased 3/2023 due to soft BPs.   Plan: continue apixaban 2.5 mg bid, metoprolol ER 25 mg bid. Cardiology follow up and pacemaker check as scheduled.     (I42.9) Cardiomyopathy, unspecified type (H)  (I25.10) Coronary artery disease involving native heart without angina pectoris, unspecified vessel or lesion type  Comment: no acute symptoms,  appears euvolemic   Plan: continue metoprolol. Follow up with Cardiology.     (N18.31) Stage 3a chronic kidney disease (H)  Comment: creatinine 1.55 on  "2023, baseline   Plan: BMP. Avoid nephrotoxins. Renal dose meds     (I10) Primary hypertension  (I95.1) Orthostatic hypotension  Comment: /80 today. Other recent readin/77   Metoprolol decreased 3/2023 due to soft BPs  Plan: continue metoprolol for rate control, continue midodrine.     (G25.0) Essential tremor  Comment: chronic  Plan: continue primidone and gabapentin     (F32.A) Depression, unspecified depression type  Comment: pleasant and in good spirits today  Plan: continue mirtazapine     (F10.10) Alcohol abuse  Comment: has 1 drink every evening, \"very large drink\" per son. No intoxication noted by staff.   Plan: monitor alcohol use    (R41.89) Cognitive impairment  Comment: gradual decline in cognition and functional status, consistent with dementia. He's alert and talkative today.   Plan: AL staff assistance with cares, meals, activities, med admin.     (R15.1) Fecal smearing  Comment: noted by family. Most likely related to difficulty managing self cares. Unclear if he's having loose stools   Plan: discontinue magnesium. Continue miralax. May require more assistance with cares, which he has so far resisted         Electronically signed by: MAGNOLIA Aalnis CNP           Sincerely,        MAGNOLIA Alanis CNP      "

## 2023-10-31 NOTE — PROGRESS NOTES
"[facility] GERIATRICS    Chief Complaint   Patient presents with    RECHECK     HPI:  Jc Cloud is a 89 year old  (3/25/1931), who is being seen today for an episodic care visit at: [facility]) [[id number]].   He moved to this facility 2/14/2022 for a higher level of care than could be managed at home. Medical history significant for CAD s/p CABG, afib, cardiomyopathy, second degree AV block, biventricular cardiac pacemaker, HTN, orthostatic hypotension, etoh abuse, neuropathy, depression, anxiety, insomnia, osteoarthritis, sleep apnea, essential tremor, cognitive impairment.  He was  hospitalized at [facility] 1/24-1/27/2022 after a fall at home. CXR showed acute appearing fractures of the left lateral ribs 7-9, chronic rib deformity of right 6th rib and suspected small left pneumothorax. He was noted to have a reddish stool in the ED and guaiac was positive. Hgb remained stable and no s/s of GI bleed. Pacemaker interrogation showed normal function. [facility]. Vitamin B12, folic acid and thiamine were started. Vitamin D was also started. BIMS 14/15, SBT 2/28, MOCA 16/30    Today's concern is:      Chronic a-fib (H)  Cardiac pacemaker in situ  Cardiomyopathy, unspecified type (H)  Coronary artery disease involving native heart without angina pectoris, unspecified vessel or lesion type  Stage 3a chronic kidney disease (H)  Primary hypertension  Orthostatic hypotension  Depression, unspecified depression type  Alcohol abuse  Cognitive impairment  He is a fair historian. Pleasant and talkative. Reports feeling well, \"everything is fine.\" Denies pain. Good appetite. Ambulates with a walker. No falls. Independent with cares. Staff reports no concerns. He tends to stay in his apartment, occasionally comes to an activity.   Spoke with his son Kan, who has been ill with COVID-19 and not able to visit as frequently as usual. He feels his dad is doing " well and has no concerns.    Allergies, and PMH/PSH reviewed in EPIC today    REVIEW OF SYSTEMS:  4 point ROS including Respiratory, CV, GI and , other than that noted in the HPI,  is negative    Objective:   /73   Pulse 69   Temp 97.1  F (36.2  C)   Resp 21   Wt 77.3 kg (170 lb 6.4 oz)   BMI 22.48 kg/m    GENERAL APPEARANCE:  Alert, in no distress    ENT:  [place], oropharynx clear  EYES:  conjunctiva and lids normal  NECK: no adenopathy or thyromegaly  RESP:  lungs clear to auscultation   CV:  irregular rhythm, no murmur, no LE edema   ABDOMEN:  soft, nontender, no distension, no  masses  M/S:  up in chair. WILKES with good strength. No joint inflammation   SKIN: no rashes or open areas   PSYCH:  oriented X 2-3, insight and judgement impaired, memory impaired, mood normal     Recent labs in [facility] reviewed by me today.       ASSESSMENT / PLAN:  (Z95.0) Cardiac pacemaker in situ  (primary encounter diagnosis)  (I48.20) Chronic a-fib (H)  Comment: rate controlled. Recent BP soft-see below   Plan: decrease metoprolol ER to 12.5 mg bid. Continue apixaban 2.5 mg bid. Son is scheduling pacemaker check and Cardiology follow up.     (I42.9) Cardiomyopathy, unspecified type (H)  (I25.10) Coronary artery disease involving native heart without angina pectoris, unspecified vessel or lesion type  Comment: no chest pain or acute symptoms   Plan: metoprolol as above.     (N18.31) Stage 3a chronic kidney disease (H)  Comment: creatinine 1.51 on 7/20/2023, baseline   Plan: follow BMP. Avoid nephrotoxins. Renal dose meds     (I10) Primary hypertension  (I95.1) Orthostatic hypotension  Comment: recent BPs: 112/73, 127/67  Plan: decrease metoprolol ER to 12.5 mg bid, continue midodrine 5 mg bid. Avoid hypotension due to advanced age and fall risk     (F32.A) Depression, unspecified depression type  Comment: appears well managed   Plan: continue mirtazapine 15 mg HS.     (F10.10) Alcohol abuse  Comment: has one drink every  evening. No intoxication noted by staff   Plan: monitor use    (R41.89) Cognitive impairment  Comment: mild to moderate deficits, consistent with dementia. Family very involved   Plan: AL staff assistance with cares, meals, activities, med admin         Electronically signed by: MAGNOLIA Alanis CNP

## 2023-10-31 NOTE — LETTER
"    10/31/2023        RE: Jc Cloud  [facility]  [address]  [facility] [place] [address]        [facility] GERIATRICS    Chief Complaint   Patient presents with     RECHECK     HPI:  Jc Cloud is a 89 year old  (3/25/1931), who is being seen today for an episodic care visit at: [facility] ([facility]) [[id number]].   He moved to this facility 2/14/2022 for a higher level of care than could be managed at home. Medical history significant for CAD s/p CABG, afib, cardiomyopathy, second degree AV block, biventricular cardiac pacemaker, HTN, orthostatic hypotension, etoh abuse, neuropathy, depression, anxiety, insomnia, osteoarthritis, sleep apnea, essential tremor, cognitive impairment.  He was  hospitalized at [facility] 1/24-1/27/2022 after a fall at home. CXR showed acute appearing fractures of the left lateral ribs 7-9, chronic rib deformity of right 6th rib and suspected small left pneumothorax. He was noted to have a reddish stool in the ED and guaiac was positive. Hgb remained stable and no s/s of GI bleed. Pacemaker interrogation showed normal function. He discharged 1/27/2021 to [facility]. Vitamin B12, folic acid and thiamine were started. Vitamin D was also started. BIMS 14/15, SBT 2/28, MOCA 16/30    Today's concern is:      Chronic a-fib (H)  Cardiac pacemaker in situ  Cardiomyopathy, unspecified type (H)  Coronary artery disease involving native heart without angina pectoris, unspecified vessel or lesion type  Stage 3a chronic kidney disease (H)  Primary hypertension  Orthostatic hypotension  Depression, unspecified depression type  Alcohol abuse  Cognitive impairment  He is a fair historian. Pleasant and talkative. Reports feeling well, \"everything is fine.\" Denies pain. Good appetite. Ambulates with a walker. No falls. Independent with cares. Staff reports no concerns. He tends to stay in his apartment, occasionally comes to an activity.   Spoke with " his son Kan, who has been ill with COVID-19 and not able to visit as frequently as usual. He feels his dad is doing well and has no concerns.    Allergies, and PMH/PSH reviewed in EPIC today    REVIEW OF SYSTEMS:  4 point ROS including Respiratory, CV, GI and , other than that noted in the HPI,  is negative    Objective:   /73   Pulse 69   Temp 97.1  F (36.2  C)   Resp 21   Wt 77.3 kg (170 lb 6.4 oz)   BMI 22.48 kg/m    GENERAL APPEARANCE:  Alert, in no distress    ENT:  [place], oropharynx clear  EYES:  conjunctiva and lids normal  NECK: no adenopathy or thyromegaly  RESP:  lungs clear to auscultation   CV:  irregular rhythm, no murmur, no LE edema   ABDOMEN:  soft, nontender, no distension, no  masses  M/S:  up in chair. WILKES with good strength. No joint inflammation   SKIN: no rashes or open areas   PSYCH:  oriented X 2-3, insight and judgement impaired, memory impaired, mood normal     Recent labs in [facility] reviewed by me today.       ASSESSMENT / PLAN:  (Z95.0) Cardiac pacemaker in situ  (primary encounter diagnosis)  (I48.20) Chronic a-fib (H)  Comment: rate controlled. Recent BP soft-see below   Plan: decrease metoprolol ER to 12.5 mg bid. Continue apixaban 2.5 mg bid. Son is scheduling pacemaker check and Cardiology follow up.     (I42.9) Cardiomyopathy, unspecified type (H)  (I25.10) Coronary artery disease involving native heart without angina pectoris, unspecified vessel or lesion type  Comment: no chest pain or acute symptoms   Plan: metoprolol as above.     (N18.31) Stage 3a chronic kidney disease (H)  Comment: creatinine 1.51 on 7/20/2023, baseline   Plan: follow BMP. Avoid nephrotoxins. Renal dose meds     (I10) Primary hypertension  (I95.1) Orthostatic hypotension  Comment: recent BPs: 112/73, 127/67  Plan: decrease metoprolol ER to 12.5 mg bid, continue midodrine 5 mg bid. Avoid hypotension due to advanced age and fall risk     (F32.A) Depression, unspecified depression type  Comment:  appears well managed   Plan: continue mirtazapine 15 mg HS.     (F10.10) Alcohol abuse  Comment: has one drink every evening. No intoxication noted by staff   Plan: monitor use    (R41.89) Cognitive impairment  Comment: mild to moderate deficits, consistent with dementia. Family very involved   Plan: AL staff assistance with cares, meals, activities, med admin         Electronically signed by: MAGNOLIA Alanis CNP           Sincerely,        MAGNOLIA Alanis CNP

## 2023-12-15 PROBLEM — J69.0 ASPIRATION PNEUMONIA OF RIGHT LOWER LOBE, UNSPECIFIED ASPIRATION PNEUMONIA TYPE (H): Status: ACTIVE | Noted: 2023-01-01

## 2023-12-15 PROBLEM — N39.0 ACUTE LOWER UTI: Status: ACTIVE | Noted: 2023-01-01

## 2023-12-15 PROBLEM — R09.02 HYPOXIA: Status: ACTIVE | Noted: 2023-01-01

## 2023-12-15 PROBLEM — R41.82 ALTERED MENTAL STATUS, UNSPECIFIED ALTERED MENTAL STATUS TYPE: Status: ACTIVE | Noted: 2023-01-01

## 2023-12-15 PROBLEM — Z86.59 HISTORY OF DEMENTIA: Status: ACTIVE | Noted: 2023-01-01
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
## 2023-12-15 PROBLEM — Z79.01 ANTICOAGULATED: Status: ACTIVE | Noted: 2023-01-01

## 2023-12-15 PROBLEM — R25.1 TREMOR: Status: ACTIVE | Noted: 2023-01-01

## 2023-12-15 NOTE — PHARMACY-ADMISSION MEDICATION HISTORY
Pharmacist Admission Medication History    Admission medication history is complete. The information provided in this note is only as accurate as the sources available at the time of the update.    Information Source(s): Patient and Facility (TCU/NH/) medication list/MAR via in-person    Pertinent Information: Per EMS and facility, patient has not taken medications yet today, medication list updated per MAR from facility    Changes made to PTA medication list:  Added: None  Deleted: None  Changed: None    Medication Affordability:  Not including over the counter (OTC) medications, was there a time in the past 3 months when you did not take your medications as prescribed because of cost?: No    Allergies reviewed with patient and updates made in EHR: yes    Medication History Completed By: Edgar Cerna [facility] 12/15/2023 9:10 AM    PTA Med List   Medication Sig Last Dose    acetaminophen (ACETAMINOPHEN EXTRA STRENGTH) 500 MG tablet Take 2 tablets (1,000 mg) by mouth 2 times daily. May also take 2 tablets (1,000 mg) daily as needed for mild pain. 12/14/2023    ELIQUIS ANTICOAGULANT 2.5 MG tablet TAKE 1 TABLET BY MOUTH TWICE DAILY 12/14/2023    gabapentin (NEURONTIN) 300 MG capsule TAKE 1 CAPSULE BY MOUTH TWICE DAILY 12/14/2023    metoprolol succinate ER (TOPROL XL) 25 MG 24 hr tablet Take 0.5 tablets (12.5 mg) by mouth 2 times daily 12/14/2023    midodrine (PROAMATINE) 5 MG tablet TAKE 1 TABLET BY MOUTH TWICE DAILY 12/14/2023    mirtazapine (REMERON) 15 MG tablet TAKE 1 TABLET BY MOUTH AT BEDTIME 12/14/2023    polyethylene glycol (MIRALAX) 17 GM/Dose powder MIX 17GM OF POWDER IN 8OZ OF WATER UNTIL COMPLETELY DISSOLVED. DRINK SOLUTION BY MOUTH ONCE DAILY. 12/14/2023    primidone (MYSOLINE) 50 MG tablet TAKE ONE-HALF TABLET (25 MG) BY MOUTH TWICE DAILY 12/14/2023    traZODone (DESYREL) 50 MG tablet TAKE 1 TABLET BY MOUTH AT BEDTIME AS NEEDED 12/14/2023    Vitamin D3 (VITAMIN D) 10 MCG (400 UNIT) tablet TAKE 1 TABLET BY  MOUTH ONCE DAILY 12/14/2023

## 2023-12-15 NOTE — ED PROVIDER NOTES
[place] EMERGENCY DEPARTMENT ([facility])    12/15/23       ED PROVIDER NOTE   ED 2 8:48 AM   History     Chief Complaint   Patient presents with    Fall     The history is provided by medical records and the EMS personnel. The history is limited by the condition of the patient.     Jc Cloud is a 89 year old male with history of CAD, chronic A-fib s/p pacemaker, hypertension cognitive impairment (felt to be a fair historian at baseline) who was brought in by [facility] from assisted living facility after being found down on the ground with altered mental status.  History is provided entirely by paramedics, patient is noncontributory.  Paramedics states that he lives in assisted living facility, has dementia at baseline but is otherwise able to get up and around and care for himself.  This morning staff member checked in on him for a med Pass and found him down on the ground and confused.  911 was called and had up and EMS responded.  On scene he was noted to be hypoxic to 80s, when he is not normally hypoxic at all.  He was put on cardiac monitoring, is paced but medics did find heart rate variable 100-120s.  He was placed on supplemental oxygen via facemask at 6 LPM; despite this he remained tachypneic with respiratory rate in 30s. Medics had difficulty obtaining blood pressures, but did note systolic in the 120s.  Blood glucose was normal at 119. His last known well was at 9 PM last night.  Negative stroke assessment At this time he is only oriented to himself, which is atypical for him. Family en route. Patient denies any pain or headache.     CareEverywhere records reviewed, he has a distant history of kidney stones.     Formerly [facility] ECHOCARDIOGRAM 12/17/2021  Date: 12/17/2021 Start: 11:42 AM [facility]     CONCLUSIONS   1. Normal left ventricle size and global and regional function. (LVEF   60%)   2. Normal right ventricular size and global function. A  pacemaker lead   is noted in the right ventricle.   3. Mild AR and MR.   4. Estimated PASP 30 mmHg + RAP.   5. A left pleural effusion is seen.   7. No evidence of pericardial effusion.   Dilation of the inferior vena cava is present with abnormal   respiratory variation in diameter. Estimated right atrial pressure is   > 15 mmHg .     Other history per daughter-in-law and son who are here later regarding patient's findings.  Note patient has had increasing generalized tremor also has been eating slower lately although unclear if aspiration etc.  Patient also seems just slightly more altered but nothing focal reported.    Past Medical History  Past Medical History:   Diagnosis Date    Alcohol abuse     Anxiety     Atrial fibrillation (H)     Closed fracture of multiple ribs of left side with routine healing     Cognitive impairment     Colon, diverticulosis     Coronary artery disease     Falls frequently     Hyperlipidemia     Intestinal adhesions with obstruction (H)     Neuropathy     Orthostatic hypotension     GLORIA (obstructive sleep apnea)     Pacemaker     Primary hypertension     Primary osteoarthritis involving multiple joints     Recurrent major depressive disorder, in remission (H24)     Sleep apnea      Past Surgical History:   Procedure Laterality Date    ADENOIDECTOMY      BYPASS GRAFT ARTERY CORONARY      CATARACT IOL, RT/LT      HERNIA REPAIR      IR THORACENTESIS  4/9/2014    OPEN REDUCTION INTERNAL FIXATION WRIST Right 1/3/2020    Procedure: OPEN REDUCTION INTERNAL FIXATION RIGHT DISTAL RADIUS FRACTURE;  Surgeon: Susan Mcdonald MD;  Location: [facility] OR     acetaminophen (ACETAMINOPHEN EXTRA STRENGTH) 500 MG tablet  ELIQUIS ANTICOAGULANT 2.5 MG tablet  gabapentin (NEURONTIN) 300 MG capsule  metoprolol succinate ER (TOPROL XL) 25 MG 24 hr tablet  midodrine (PROAMATINE) 5 MG tablet  mirtazapine (REMERON) 15 MG tablet  polyethylene glycol (MIRALAX) 17 GM/Dose powder  primidone (MYSOLINE) 50 MG  tablet  traZODone (DESYREL) 50 MG tablet  Vitamin D3 (VITAMIN D) 10 MCG (400 UNIT) tablet      Allergies   Allergen Reactions    Atenolol     Lisinopril     Meperidine     Metoprolol     Quetiapine     Triamterene      Family History  No family history on file.  Social History   Social History     Tobacco Use    Smoking status: Former     Packs/day: 2.00     Years: 30.00     Additional pack years: 0.00     Total pack years: 60.00     Types: Cigarettes, Cigars     Start date:      Quit date:      Years since quittin.9    Smokeless tobacco: Never   Substance Use Topics    Alcohol use: Yes     Comment: 2 drinks/day    Drug use: Never         A medically appropriate review of systems was performed with pertinent positives and negatives noted in the HPI, and all other systems negative.     Physical Exam   BP: (!) 151/137  Pulse: 106  Temp: 98.8  F (37.1  C)  Resp: 30  Weight: 83.1 kg (183 lb 1.6 oz)  SpO2: 98 %    Wt Readings from Last 10 Encounters:   12/15/23 83.1 kg (183 lb 1.6 oz)   10/31/23 77.3 kg (170 lb 6.4 oz)   23 78.5 kg (173 lb)   23 78.5 kg (173 lb)   23 77.5 kg (170 lb 12.8 oz)   10/20/22 76.4 kg (168 lb 6.4 oz)   22 72.6 kg (160 lb)   22 74.8 kg (165 lb)   22 72.4 kg (159 lb 9.6 oz)   22 72.4 kg (159 lb 9.6 oz)     Physical Exam  Vitals and nursing note reviewed.   Constitutional:       General: He is in acute distress.      Appearance: He is well-developed. He is not toxic-appearing or diaphoretic.      Comments: Patient vitally stable here in the ER.  Hypoxic noted requiring O2 by nasal cannulae speaking full sentences has some mild dementia but no focal findings seen here in the ER.  No trauma identified to head etc.  No bruising moves all extremities no hip pain etc.  Abdomen benign.   HENT:      Head: Normocephalic and atraumatic.      Ears:      Comments: Negative vital signs no otorhinorrhea     Nose: No congestion or rhinorrhea.       Mouth/Throat:      Mouth: Mucous membranes are moist.      Pharynx: No oropharyngeal exudate.   Eyes:      General: No scleral icterus.     Extraocular Movements: Extraocular movements intact.      Conjunctiva/sclera: Conjunctivae normal.      Pupils: Pupils are equal, round, and reactive to light.   Cardiovascular:      Rate and Rhythm: Normal rate. Rhythm irregular.   Pulmonary:      Effort: Pulmonary effort is normal. No respiratory distress.      Breath sounds: Rales present.      Comments: Patient requiring O2 some rales in the right base noted some decreased breath sounds in left  Abdominal:      General: Abdomen is flat. There is no distension.      Palpations: Abdomen is soft. There is no mass.      Tenderness: There is no abdominal tenderness. There is no guarding.   Musculoskeletal:         General: No tenderness or signs of injury.      Cervical back: Normal range of motion and neck supple.   Skin:     General: Skin is warm and dry.      Capillary Refill: Capillary refill takes less than 2 seconds.      Coloration: Skin is not jaundiced or pale.      Findings: No bruising or rash.   Neurological:      General: No focal deficit present.      Mental Status: He is alert and oriented to person, place, and time.   Psychiatric:      Comments: Mildly flattened otherwise appropriate here in the ER.         ED Course   Records reviewed in epic patient has a history of mild dementia.  Patient with history of A-fib with Eliquis prescribed.  Patient been compliant.    In the ER upon arrival patient was evaluated.  IV had been established.  Oxygen continued on 2 to 4 L.  Ox saturation stable.  Patient not requiring further assistance with oxygen needs at this point.  Labs are drawn.  EKG done revealing s ventricular paced rhythm.  Portable chest x-ray shows questionable left pleural effusion along with some lower lobe infiltrates on the right.  No pneumothorax noted reviewed by myself also.  Troponin 63.  Urinalysis  shows 120 white cells with leukocyte esterase positive.  Urine culture pending CRP is 42.2 BNP is 4400 lactic acid 1.7 initially pCO2 was 53 venous pH is 7.34.  Ammonia 19 COVID influenza RSV were negative.  Sodium 144 potassium initially 6.1 but rechecked that was abnormal and it was normal approximate 4.3.  Glucose 119.  LFTs within normal limits.  Lactic acid formal 1.8.    With patient's hypoxia noted with chest x-ray findings and urinalysis will start with Zosyn IV covering aspiration and urinary tract infection at this point blood cultures been drawn also.  Patient maintained stability here in the ER reassessed several times discussed with son also regarding findings etc.    Head CT initially was negative here in the ER for any bleeding.  As noted patient is on Eliquis.    No visible signs of trauma seen.    Patient tried to drink some water but had some questionable aspiration issues.  I talked to medicine I did place a neurology consult he will see the patient with the generalized tremor with possible syncopal episode with history of this in the past along with some swallowing difficulties that seem to be progressive and has been possibly ongoing.  Patient with some slight altered mental status also neurology consult also plan to admit to medicine though at this point.          ED Course as of 12/15/23 2150   Fri Dec 15, 2023   0937 Rechecked patient, met with daughter-in-law   1018 Critical lab value reported 6.1   1144 N-Terminal Pro BNP Inpatient(!): 4,428  No prior history of heart failure noted in [facility]   1145 Leukocyte Esterase Urine(!): Large  No prior history of UTI noted in [facility] or [facility].    1149 Rechecked patient at bedside     Procedures              EKG Interpretation:      Interpreted by Jonh Alamo MD  Time reviewed: 0853  Symptoms at time of EKG: Altered mental status  Rhythm: Ventricular paced rhythm  Rate: 112  Axis: normal  Ectopy: none  Conduction: normal  ST  Segments/ T Waves: No ST-T wave changes  Q Waves: none  Comparison to prior: No old EKG available    Clinical Impression: Ventricular paced rhythm          Results for orders placed or performed during the hospital encounter of 12/15/23 (from the past 24 hour(s))   Burns Draw    Narrative    The following orders were created for panel order Burns Draw.  Procedure                               Abnormality         Status                     ---------                               -----------         ------                     Extra Blue Top Tube[[id number]]                              Final result               Extra Red Top Tube[[id number]]                                                          Extra Green Top (Lithium...[[id number]]                      Final result               Extra Purple Top Tube[[id number]]                            Final result                 Please view results for these tests on the individual orders.   Lactic acid whole blood   Result Value Ref Range    Lactic Acid 1.8 0.7 - 2.0 mmol/L   Extra Blue Top Tube   Result Value Ref Range    Hold Specimen JIC    Extra Green Top (Lithium Heparin) Tube   Result Value Ref Range    Hold Specimen JIC    Extra Purple Top Tube   Result Value Ref Range    Hold Specimen JIC    CBC with platelets differential    Narrative    The following orders were created for panel order CBC with platelets differential.  Procedure                               Abnormality         Status                     ---------                               -----------         ------                     CBC with platelets and d...[[id number]]  Abnormal            Final result                 Please view results for these tests on the individual orders.   Partial thromboplastin time   Result Value Ref Range    aPTT 34 22 - 38 Seconds   INR   Result Value Ref Range    INR 1.13 0.85 - 1.15   ABO/Rh type and screen *Canceled*    Narrative    The following orders were created for  panel order ABO/Rh type and screen.  Procedure                               Abnormality         Status                     ---------                               -----------         ------                     Adult Type and Screen[[id number]]                                                         Please view results for these tests on the individual orders.   CRP inflammation   Result Value Ref Range    CRP Inflammation 42.20 (H) <5.00 mg/L   Comprehensive metabolic panel   Result Value Ref Range    Sodium 144 135 - 145 mmol/L    Potassium 6.1 (HH) 3.4 - 5.3 mmol/L    Carbon Dioxide (CO2) 24 22 - 29 mmol/L    Anion Gap 9 7 - 15 mmol/L    Urea Nitrogen 30.7 (H) 8.0 - 23.0 mg/dL    Creatinine 1.46 (H) 0.67 - 1.17 mg/dL    GFR Estimate 45 (L) >60 mL/min/1.73m2    Calcium 7.5 (L) 8.2 - 9.6 mg/dL    Chloride 111 (H) 98 - 107 mmol/L    Glucose 119 (H) 70 - 99 mg/dL    Alkaline Phosphatase 99 40 - 150 U/L    AST 34 0 - 45 U/L    ALT 20 0 - 70 U/L    Protein Total 6.0 (L) 6.4 - 8.3 g/dL    Albumin 3.4 (L) 3.5 - 5.2 g/dL    Bilirubin Total 0.6 <=1.2 mg/dL   Magnesium   Result Value Ref Range    Magnesium 2.0 1.7 - 2.3 mg/dL   Phosphorus   Result Value Ref Range    Phosphorus 2.5 2.5 - 4.5 mg/dL   Troponin T, High Sensitivity   Result Value Ref Range    Troponin T, High Sensitivity 46 (H) <=22 ng/L   Nt probnp inpatient (BNP)   Result Value Ref Range    N terminal Pro BNP Inpatient 4,428 (H) 0 - 1,800 pg/mL   CBC with platelets and differential   Result Value Ref Range    WBC Count 8.1 4.0 - 11.0 10e3/uL    RBC Count 4.49 4.40 - 5.90 10e6/uL    Hemoglobin 15.9 13.3 - 17.7 g/dL    Hematocrit 52.1 40.0 - 53.0 %     (H) 78 - 100 fL    MCH 35.4 (H) 26.5 - 33.0 pg    MCHC 30.5 (L) 31.5 - 36.5 g/dL    RDW 13.3 10.0 - 15.0 %    Platelet Count 156 150 - 450 10e3/uL    % Neutrophils 89 %    % Lymphocytes 3 %    % Monocytes 7 %    % Eosinophils 0 %    % Basophils 0 %    % Immature Granulocytes 1 %    NRBCs per 100 WBC 0 <1  /100    Absolute Neutrophils 7.2 1.6 - 8.3 10e3/uL    Absolute Lymphocytes 0.2 (L) 0.8 - 5.3 10e3/uL    Absolute Monocytes 0.6 0.0 - 1.3 10e3/uL    Absolute Eosinophils 0.0 0.0 - 0.7 10e3/uL    Absolute Basophils 0.0 0.0 - 0.2 10e3/uL    Absolute Immature Granulocytes 0.1 <=0.4 10e3/uL    Absolute NRBCs 0.0 10e3/uL   EKG 12 lead   Result Value Ref Range    Systolic Blood Pressure  mmHg    Diastolic Blood Pressure  mmHg    Ventricular Rate 112 BPM    Atrial Rate 52 BPM    CA Interval  ms    QRS Duration 126 ms     ms    QTc 466 ms    P Axis  degrees    R AXIS -49 degrees    T Axis -16 degrees    Interpretation ECG       Ventricular-paced rhythm with occasional atrial-paced complexes and with occasional Premature ventricular complexes  Abnormal ECG     ABO/Rh type and screen *Canceled*    Narrative    The following orders were created for panel order ABO/Rh type and screen.  Procedure                               Abnormality         Status                     ---------                               -----------         ------                       Please view results for these tests on the individual orders.   iStat Gases (lactate) venous, POCT   Result Value Ref Range    Lactic Acid POCT 1.7 <=2.0 mmol/L    Bicarbonate Venous POCT 29 (H) 21 - 28 mmol/L    O2 Sat, Venous POCT 20 (L) 94 - 100 %    pCO2 Venous POCT 53 (H) 40 - 50 mm Hg    pH Venous POCT 7.34 7.32 - 7.43    pO2 Venous POCT 17 (L) 25 - 47 mm Hg   Asymptomatic Influenza A/B, RSV, & SARS-CoV2 PCR (COVID-19) Nasopharyngeal    Specimen: Nasopharyngeal; Swab   Result Value Ref Range    Influenza A PCR Negative Negative    Influenza B PCR Negative Negative    RSV PCR Negative Negative    SARS CoV2 PCR Negative Negative    Narrative    Testing was performed using the Xpert Xpress CoV2/Flu/RSV Assay on the [employer] Instrument. This test should be ordered for the detection of SARS-CoV-2, influenza, and RSV viruses in individuals who meet clinical  and/or epidemiological criteria. Test performance is unknown in asymptomatic patients. This test is for in vitro diagnostic use under the FDA EUA for laboratories certified under CLIA to perform high or moderate complexity testing. This test has not been FDA cleared or approved. A negative result does not rule out the presence of PCR inhibitors in the specimen or target RNA in concentration below the limit of detection for the assay. If only one viral target is positive but coinfection with multiple targets is suspected, the sample should be re-tested with another FDA cleared, approved, or authorized test, if coinfection would change clinical management. This test was validated by the [facility]. These laboratories are certified under the Clinical Laboratory Improvement Amendments of 1988 (CLIA-88) as qualified to perform high complexity laboratory testing.   XR Chest Port 1 View    Narrative    EXAM: XR CHEST PORT 1 VIEW 12/15/2023 9:09 AM    DEMOGRAPHICS: 89 years Male    INDICATION: fall. Hypoxic on seen, requiring supplemental oxygen.  Tachypnea.    COMPARISON: None    TECHNIQUE: Single portable AP view of the chest.    FINDINGS:   Median sternotomy wires, most inferior wire is fractured. Mediastinal  surgical clips. Left chest wall implanted, dual-chamber pacemaker.    Trachea is midline. Cardiac silhouette is mildly enlarged. No  pneumothorax. Small left pleural effusion. Mild perihilar and  bibasilar streaky/patchy opacities, greatest in the right lower lobe.  Upper abdomen exhibits nonspecific bowel gas pattern. Chronic  appearing deformity of the lateral right sixth rib. Chronic  deformities of posterior left ribs.      Impression    IMPRESSION:   1.  Perihilar and bibasilar streaky/patchy opacities, greatest in the  right lower lobe. Findings may represent atelectasis, pulmonary edema,  aspiration, however cannot exclude infection.  2.  Likely small left pleural effusion.  3.  Chronic  appearing rib deformities bilaterally.     I have personally reviewed the examination and initial interpretation  and I agree with the findings.    SUSANNA ZELAYA MD         SYSTEM ID:  [id number]   CT Head w/o Contrast    Narrative    CT HEAD W/O CONTRAST 12/15/2023 9:30 AM    History: fall     Comparison: CT 12/28/2019    Technique: Using multidetector thin collimation helical acquisition  technique, axial, coronal and sagittal CT images from the skull base  to the vertex were obtained without intravenous contrast.   (topogram) image(s) also obtained and reviewed.    Findings: There is no intracranial hemorrhage, mass effect, or midline  shift. Gray/white matter differentiation in both cerebral hemispheres  is preserved. Possible chronic infarction right lateral to the right  caudate head. Moderate nonspecific bilateral periventricular white  matter hypodensities. Mild diffuse cerebral atrophy. Ventricles are  proportionate to the cerebral sulci. The basal cisterns are clear.  Vascular calcifications of the carotid siphons and vertebral arteries.    The bony calvaria and the bones of the skull base are normal. Right  maxillary wall thickening. Scattered mucosal thickening of the ethmoid  air cells. Left greater than right soft tissue debris within the  external auditory canals, presumably cerumen. Bilateral pseudophakia.      Impression    Impression:  1. No acute intracranial pathology.   2. Moderate sequela of chronic small vessel ischemic disease and mild  generalized cerebral parenchymal volume loss.    I have personally reviewed the examination and initial interpretation  and I agree with the findings.    MANJU HOOD MD         SYSTEM ID:  [id number]   Ammonia   Result Value Ref Range    Ammonia 19 16 - 60 umol/L   Potassium   Result Value Ref Range    Potassium 4.6 3.4 - 5.3 mmol/L   CK total   Result Value Ref Range    CK 86 39 - 308 U/L   UA with Microscopic reflex to Culture    Specimen: Urine,  Midstream   Result Value Ref Range    Color Urine Yellow Colorless, Straw, Light Yellow, Yellow    Appearance Urine Slightly Cloudy (A) Clear    Glucose Urine 200 (A) Negative mg/dL    Bilirubin Urine Negative Negative    Ketones Urine Negative Negative mg/dL    Specific Gravity Urine 1.021 1.003 - 1.035    Blood Urine Moderate (A) Negative    pH Urine 5.5 5.0 - 7.0    Protein Albumin Urine 70 (A) Negative mg/dL    Urobilinogen Urine Normal Normal, 2.0 mg/dL    Nitrite Urine Negative Negative    Leukocyte Esterase Urine Large (A) Negative    Bacteria Urine Few (A) None Seen /HPF    Mucus Urine Present (A) None Seen /LPF    RBC Urine 5 (H) <=2 /HPF    WBC Urine 120 (H) <=5 /HPF    Transitional Epithelials Urine <1 <=1 /HPF    Narrative    Urine Culture ordered based on laboratory criteria   Echocardiogram Complete   Result Value Ref Range    LVEF  50-55% (borderline)     Narrative    [id number]  [id number]  [id number]  [id number]^CODY^SAMARA^GEOVANNI     [facility]  Echocardiography Laboratory  [address] [place] [id number]     Name: LAUREN MILLS  MRN: [id number]  : 1931  Study Date: 12/15/2023 01:17 PM  Age: 89 yrs  Gender: Male  Patient Location: [facility]  Reason For Study: Syncope  Ordering Physician: SAMARA ROSS  Performed By: Camila Mason     BSA: 2.1 m2  Height: 74 in  Weight: 183 lb  HR: 86  BP: 120/72 mmHg  ______________________________________________________________________________  Procedure  Complete Portable Echo Adult.  ______________________________________________________________________________  Interpretation Summary  Left ventricular function is decreased. The ejection fraction is 50-55%  (borderline).  Global right ventricular function is borderline reduced.  Mild to moderate mitral insufficiency is present.  Mild aortic insufficiency is present.  Moderate tricuspid insufficiency is present. The right ventricular systolic  pressure is 45mmHg  above the right atrial pressure. Pulmonary hypertension is  present.  No pericardial effusion is present.  Previous study not available for comparison.  ______________________________________________________________________________  Left Ventricle  Left ventricular size is normal. Left ventricular function is decreased. The  ejection fraction is 50-55% (borderline). Abnormal septal motion consistent  with pacemaker is present.     Right Ventricle  Global right ventricular function is borderline reduced. A pacemaker lead is  noted in the right ventricle.     Mitral Valve  Mild to moderate mitral insufficiency is present.     Aortic Valve  Moderate aortic valve calcification is present. Mild aortic insufficiency is  present.     Tricuspid Valve  Moderate tricuspid insufficiency is present. The right ventricular systolic  pressure is 45mmHg above the right atrial pressure. Pulmonary hypertension is  present.     Pulmonic Valve  The pulmonic valve is normal. Trace pulmonic insufficiency is present.     Vessels  Proximal ascending aorta is borderline dilated measuring 4cm. The inferior  vena cava cannot be assessed.     Pericardium  No pericardial effusion is present.     Compared to Previous Study  Previous study not available for comparison.     ______________________________________________________________________________  MMode/2D Measurements & Calculations  asc Aorta Diam: 4.0 cm     EF(MOD-bp): 48.7 %  Asc Ao diam index BSA (cm/m2): 1.9     Asc Ao diam index Ht(cm/m): 2.1     Doppler Measurements & Calculations  PA acc time: 0.09 sec  TR max bibi: 383.7 cm/sec  TR max P.9 mmHg     ______________________________________________________________________________  Report approved by: Vitaly Nj 12/15/2023 02:49 PM         Troponin T, High Sensitivity   Result Value Ref Range    Troponin T, High Sensitivity 63 (H) <=22 ng/L   Extra Tube    Narrative    The following orders were created for  panel order Extra Tube.  Procedure                               Abnormality         Status                     ---------                               -----------         ------                     Extra Purple Top Tube[[id number]]                            Final result                 Please view results for these tests on the individual orders.   Extra Purple Top Tube   Result Value Ref Range    Hold Specimen JIC      Medications   lidocaine 1 % 0.1-1 mL (has no administration in time range)   lidocaine (LMX4) cream (has no administration in time range)   sodium chloride (PF) 0.9% PF flush 3 mL ( Intracatheter Canceled Entry 12/15/23 1655)   sodium chloride (PF) 0.9% PF flush 3 mL (has no administration in time range)   lidocaine 1 % 0.1-1 mL (has no administration in time range)   lidocaine (LMX4) cream (has no administration in time range)   sodium chloride (PF) 0.9% PF flush 3 mL ( Intracatheter Canceled Entry 12/15/23 1423)   sodium chloride (PF) 0.9% PF flush 3 mL (has no administration in time range)   senna-docusate (SENOKOT-S/PERICOLACE) 8.6-50 MG per tablet 1 tablet (has no administration in time range)     Or   senna-docusate (SENOKOT-S/PERICOLACE) 8.6-50 MG per tablet 2 tablet (has no administration in time range)   calcium carbonate (TUMS) chewable tablet 1,000 mg (has no administration in time range)   Patient is already receiving anticoagulation with heparin, enoxaparin (LOVENOX), warfarin (COUMADIN)  or other anticoagulant medication (has no administration in time range)   acetaminophen (TYLENOL) tablet 650 mg (has no administration in time range)     Or   acetaminophen (TYLENOL) Suppository 650 mg (has no administration in time range)   apixaban ANTICOAGULANT (ELIQUIS) tablet 2.5 mg ( Oral Automatically Held 12/18/23 2000)   gabapentin (NEURONTIN) capsule 300 mg ( Oral Automatically Held 12/18/23 2000)   metoprolol succinate ER (TOPROL-XL) 24 hr half-tab 12.5 mg (12.5 mg Oral $Given 12/15/23 1956)    midodrine (PROAMATINE) tablet 5 mg (5 mg Oral $Given 12/15/23 1956)   mirtazapine (REMERON) tablet 15 mg (has no administration in time range)   primidone (MYSOLINE) half-tab 25 mg (has no administration in time range)   cefTRIAXone (ROCEPHIN) 2 g vial to attach to  ml bag for ADULTS or NS 50 ml bag for PEDS (2 g Intravenous $New Bag 12/15/23 1616)   sodium chloride 0.9% BOLUS 1,000 mL (0 mLs Intravenous Stopped 12/15/23 1035)   piperacillin-tazobactam (ZOSYN) 4.5 g vial to attach to  mL bag (0 g Intravenous Stopped 12/15/23 1316)           Critical care was performed.   Critical Care Addendum  My initial assessment, based on my review of prehospital provider report, review of nursing observations, review of vital signs, focused history, physical exam, review of cardiac rhythm monitor, 12 lead ECG analysis, discussion with medicine and neurology, and interpretation of EKG and ct and labs etc , established a high suspicion that Jc Cloud has respiratory distress and altered mental status, which requires immediate intervention, and therefore he is critically ill.     After the initial assessment, the care team initiated multiple lab tests, initiated IV fluid administration, initiated medication therapy with zosyn IV, and consulted with medicine and neurology  to provide stabilization care. Due to the critical nature of this patient, I reassessed nursing observations, vital signs, physical exam, review of cardiac rhythm monitor, 12 lead ECG analysis, interpretation of ct and xray and labs, mental status, neurologic status, and respiratory status multiple times prior to his disposition.     Time also spent performing documentation, discussion with family to obtain medical information for decision making, reviewing test results, discussion with consultants, and coordination of care.     Critical care time (excluding teaching time and procedures): 60 minutes.     Assessments & Plan (with Medical  Decision Making)  89-year-old male history of orthostatic hypotension along with dementia history of A-fib on Eliquis at assisted living found on the ground today with fall unclear if hit head seizure etc. seem mildly altered presented ER for evaluation mildly hypoxic requiring O2 chest x-ray shows questionable right lower lobe pneumonia but white count otherwise stable.  Patient has a pacemaker also that was interrogated without any signs of events.  Patient had an echo also done with results pending initially.  Urinalysis concerning for infection also.  Patient otherwise stable here in the ER on Eliquis head CT negative for bleeding.  Neurologic without focal findings will admit to medicine for concern for aspiration pneumonia along with UTI altered mental status syncopal episode potentially with history of orthostatic hypotension on anticoagulants.  With some mild tremor noted diffuse nonfocal findings with some dysphagia also noted may need further swallow study etc. I have consulted neurology who will see the patient also.  Patient to be admitted to medicine.  Continues to require 2 to 4 L nasal cannulae for oxygen saturations because of hypoxia.       I have reviewed the nursing notes.    I have reviewed the findings, diagnosis, plan and need for follow up with the patient.    New Prescriptions    No medications on file       Final diagnoses:   Fall, initial encounter   Anticoagulated   Aspiration pneumonia of right lower lobe, unspecified aspiration pneumonia type (H)   Acute lower UTI   Hypoxia   Altered mental status, unspecified altered mental status type   History of dementia   Tremor     I, Liz Cook, am serving as a trained medical scribe to document services personally performed by Jonh Alamo MD based on the provider's statements to me on December 15, 2023.  This document has been checked and approved by the attending provider.    I, Jonh Alamo MD, was physically present and have  reviewed and verified the accuracy of this note documented by Liz Cook, medical scribe.      Jonh Alamo MD     12/15/2023   [facility] EMERGENCY DEPARTMENT    This note was created at least in part by the use of dragon voice dictation system. Inadvertent typographical errors may still exist.  Jonh Alamo MD.  Patient evaluated in the emergency department during the COVID-19 pandemic period. Careful attention to patients safety was addressed throughout the evaluation. Evaluation and treatment management was initiated with disposition made efficiently and appropriate as possible to minimize any risk of potential exposure to patient during this evaluation.       Jonh Alamo MD  12/15/23 8815

## 2023-12-15 NOTE — PROGRESS NOTES
Medicine triage note  -Patient was accepted to internal medicine service of [place] [address], attending physician of [facility] medicine  and resident physician .The patient was accepted to [facility] level of care.

## 2023-12-15 NOTE — ED NOTES
Bed: ED02  Expected date: 12/15/23  Expected time: 8:41 AM  Means of arrival: Ambulance  Comments:  H421  89M  SEPSIS ALERT  RED

## 2023-12-15 NOTE — PROGRESS NOTES
12/15/23 4842   Appointment Info   Signing Clinician's Name / Credentials (SLP) Stephanie Gandhi MS CCC-SLP   General Information   Onset of Illness/Injury or Date of Surgery 12/15/23   Referring Physician Dar Zaragoza MD   Patient/Family Therapy Goal Statement (SLP) None stated   Pertinent History of Current Problem Jc Cloud is a 89 year old male with history of CAD, chronic A-fib s/p pacemaker, hypertension cognitive impairment (felt to be a fair historian at baseline) who was brought in by [facility] from assisted living facility after being found down on the ground with altered mental status.  History is provided entirely by paramedics, patient is noncontributory.  Paramedics states that he lives in assisted living facility, has dementia at baseline but is otherwise able to get up and around and care for himself.  This morning staff member checked in on him for a med Pass and found him down on the ground and confused.  911 was called and had up and EMS responded.  On scene he was noted to be hypoxic to 80s, when he is not normally hypoxic at all.  He was put on cardiac monitoring, is paced but medics did find heart rate variable 100-120s.  He was placed on supplemental oxygen via facemask at 6 LPM; despite this he remained tachypneic with respiratory rate in 30s. Medics had difficulty obtaining blood pressures, but did note systolic in the 120s.  Blood glucose was normal at 119. His last known well was at 9 PM last night.  Negative stroke assessment At this time he is only oriented to himself, which is atypical for him. Family en route. Patient denies any pain or headache. Clinical swallow eval completed per MD orders to further assess oropharyngeal swallow function.   Type of Evaluation   Type of Evaluation Swallow Evaluation   Oral Motor   Oral Musculature generally intact   Structural Abnormalities none present   Mucosal Quality dry   Dentition (Oral Motor)   Dentition (Oral Motor)  adequate dentition   Facial Symmetry (Oral Motor)   Facial Symmetry (Oral Motor) WNL   Lip Function (Oral Motor)   Lip Range of Motion (Oral Motor) WNL   Tongue Function (Oral Motor)   Tongue ROM (Oral Motor) WNL   Jaw Function (Oral Motor)   Jaw Function (Oral Motor) WNL   Cough/Swallow/Gag Reflex (Oral Motor)   Soft Palate/Velum (Oral Motor) WNL   Volitional Throat Clear/Cough (Oral Motor) WNL   Vocal Quality/Secretion Management (Oral Motor)   Vocal Quality (Oral Motor) WFL   Secretion Management (Oral Motor) WNL   General Swallowing Observations   Past History of Dysphagia No hx of dysphagia per chart review or pt report. Per son, pt occasionally has trouble with pills.   Respiratory Support nasal cannula   Current Diet/Method of Nutritional Intake (General Swallowing Observations, NIS) NPO   Swallowing Evaluation Clinical swallow evaluation   Clinical Swallow Evaluation   Feeding Assistance frequent cues/help required   Clinical Swallow Evaluation Textures Trialed thin liquids;pureed;solid foods   Clinical Swallow Eval: Thin Liquid Texture Trial   Mode of Presentation, Thin Liquids self-fed;spoon;cup;fed by clinician   Volume of Liquid or Food Presented 8 oz   Oral Phase of Swallow WFL  (bolus holding)   Pharyngeal Phase of Swallow impaired;coughing/choking   Diagnostic Statement Thin liquids via cup resulted in overt s/sx of aspiration marked by coughing x1 following large, consecutive sips. No other overt s/sx of aspiration.   Clinical Swallow Evaluation: Puree Solid Texture Trial   Mode of Presentation, Puree spoon;self-fed   Volume of Puree Presented 3 tbsp   Oral Phase, Puree WFL   Pharyngeal Phase, Puree intact   Diagnostic Statement No overt s/sx of aspiration   Clinical Swallow Evaluation: Solid Food Texture Trial   Mode of Presentation self-fed   Volume Presented 3 tbsp   Oral Phase   (prolonged but functional time for mastication)   Pharyngeal Phase intact   Diagnostic Statement No overt s/sx of  aspiration. Pt required prolonged but functional time for mastication.   Esophageal Phase of Swallow   Patient reports or presents with symptoms of esophageal dysphagia No   Swallowing Recommendations   Diet Consistency Recommendations thin liquids (level 0);regular diet   Supervision Level for Intake 1:1 supervision needed   Mode of Delivery Recommendations bolus size, small;food moistened;slow rate of intake   Swallowing Maneuver Recommendations alternate food and liquid intake;extra swallow   Monitoring/Assistance Required (Eating/Swallowing) monitor for cough or change in vocal quality with intake;stop eating activities when fatigue is present   Recommended Feeding/Eating Techniques (Swallow Eval) maintain upright sitting position for eating;maintain upright posture during/after eating for 30 minutes;minimize distractions during oral intake;set-up and prepare tray   Medication Administration Recommendations, Swallowing (SLP) as tolerated   Instrumental Assessment Recommendations instrumental evaluation not recommended at this time   General Therapy Interventions   Planned Therapy Interventions Dysphagia Treatment   Dysphagia treatment Instruction of safe swallow strategies;Compensatory strategies for swallowing   Clinical Impression   Criteria for Skilled Therapeutic Interventions Met (SLP Eval) Yes, treatment indicated   SLP Diagnosis mild oropharyngeal dysphagia   Risks & Benefits of therapy have been explained evaluation/treatment results reviewed;care plan/treatment goals reviewed;risks/benefits reviewed;current/potential barriers reviewed;participants voiced agreement with care plan;participants included;patient;son   Clinical Impression Comments Clinical swallow eval completed per MD orders. Pt presents with mild oropharyngeal dysphagia. Oral mech exam unremarkable. Thin liquids via cup resulted in overt s/sx of aspiration marked by coughing x1 on large, consecutive sip. No other overt s/sx of aspiration  with cues to take small sips. Pt intermittently bolus holding and required cues to swallow. Pt tolerated pureed and regular solid textures with no overt s/sx of aspiration. Pt required prolonged but functional time for mastication. Recommend regular textures and thin liquids with 1:1 supervision. Pt should be fully upright and alert for all PO, take small sips/bites, slow pacing, and alternate between consistencies. ST to continue to follow to assess diet tolerance and train safe swallow strategies. Pt may require ST follow-up at discharge pending progress.   SLP Discharge Recommendation home with home health   SLP Rationale for DC Rec pt below baseline oropharyngeal swallowing skills   SLP Brief overview of current status  Recommend regular textures and thin liquids with 1:1 supervision. Pt should be fully upright and alert for all PO, take small sips/bites, slow pacing, and alternate between consistencies.   Total Session Time   Total Session Time (sum of timed and untimed services) 23

## 2023-12-15 NOTE — ED TRIAGE NOTES
BIBA from assisted living   Found on floor of of room this morning.  On eliquis   Tremors at baseline.

## 2023-12-16 NOTE — PROGRESS NOTES
"   12/16/23 1000   Appointment Info   Signing Clinician's Name / Credentials (OT) Nery Galaviz, KEITH, OTR/L   Rehab Comments (OT) attempted to contact [facility] for PLOF, unable to connect to pt's [facility] RN for more information   Living Environment   People in Home facility resident   Current Living Arrangements assisted living   Home Accessibility no concerns   Living Environment Comments per chart review, pt lives in [facility] & fairly IND/reasonable historian at baseline. However, pt unable to describe any details when asked, stating \"I guess I don't know\"   Self-Care   Fall history within last six months yes   Number of times patient has fallen within last six months 1   Activity/Exercise/Self-Care Comment pt found on floor by RN at [facility], however pt unable to confirm.   General Information   Onset of Illness/Injury or Date of Surgery 12/15/23   Referring Physician Dar Zaragoza MD   Additional Occupational Profile Info/Pertinent History of Current Problem \"Jc Cloud is a 89 year old male admitted on 12/15/2023. He has a history of coronary artery disease status post CABG, A-fib and AV block on Eliquis, hypertension, depression and anxiety, sleep apnea, essential tremor, mild cognitive impairment, chronic kidney disease and presented  after being found confused this morning at his assisted living facility and was found to have acute toxic metabolic encephalopathy and acute hypoxic respiratory failure likely due to urinary tract infection with possible superimposed pneumonia.\"   Existing Precautions/Restrictions fall   Left Upper Extremity (Weight-bearing Status) full weight-bearing (FWB)   Right Upper Extremity (Weight-bearing Status) full weight-bearing (FWB)   Left Lower Extremity (Weight-bearing Status) full weight-bearing (FWB)   Right Lower Extremity (Weight-bearing Status) full weight-bearing (FWB)   Cognitive Status Examination   Orientation Status person   Affect/Mental Status (Cognitive) confused   Follows " Commands follows one-step commands;physical/tactile prompts required;repetition of directions required;verbal cues/prompting required   Memory Deficit severe deficit   Cognitive Status Comments pt very confused, unable to recall any of PLOF.   Sensory   Sensory Comments endorsing no acute change   Posture   Posture forward head position;protracted shoulders   Range of Motion Comprehensive   Comment, General Range of Motion appears WFL   Strength Comprehensive (MMT)   Comment, General Manual Muscle Testing (MMT) Assessment generalized weakness   Coordination   Upper Extremity Coordination No deficits were identified   Bed Mobility   Bed Mobility supine-sit   Supine-Sit [place] (Bed Mobility) moderate assist (50% patient effort)   Comment (Bed Mobility) assist with initiation and lifting trunk   Clinical Impression   Criteria for Skilled Therapeutic Interventions Met (OT) Yes, treatment indicated   OT Diagnosis anticipate pt with dec IND with I/ADLs, grossly weak   OT Problem List-Impairments impacting ADL problems related to;activity tolerance impaired;cognition;balance;mobility;strength   Assessment of Occupational Performance 3-5 Performance Deficits   Identified Performance Deficits anticipate pt below PLOF with bathing, dressing, ambulation/transfers, toileting   Planned Therapy Interventions (OT) ADL retraining;IADL retraining;balance training;bed mobility training;cognition;strengthening;transfer training;home program guidelines;progressive activity/exercise   Clinical Decision Making Complexity (OT) problem focused assessment/low complexity   Risk & Benefits of therapy have been explained evaluation/treatment results reviewed;care plan/treatment goals reviewed;risks/benefits reviewed;current/potential barriers reviewed;participants voiced agreement with care plan;participants included;patient   OT Total Evaluation Time   OT Eval, Low Complexity Minutes (76104) 5   OT Goals   Therapy Frequency (OT) 5  "times/week   OT Predicted Duration/Target Date for Goal Attainment 12/30/23   OT Goals Hygiene/Grooming;Upper Body Dressing;Lower Body Dressing;Toilet Transfer/Toileting   OT: Hygiene/Grooming supervision/stand-by assist  (may need to be modified once PLOF established)   OT: Upper Body Dressing Supervision/stand-by assist  (may need to be modified once PLOF established)   OT: Lower Body Dressing Supervision/stand-by assist  (may need to be modified once PLOF established)   OT: Toilet Transfer/Toileting Supervision/stand-by assist  (may need to be modified once PLOF established)   Interventions   Interventions Quick Adds Therapeutic Activity   Therapeutic Activities   Therapeutic Activity Minutes (05198) 9   Symptoms noted during/after treatment dizziness   Treatment Detail/Skilled Intervention pt sitting EOB upon completion of eval, pt /86. Facilitated STS from EOB min A to continue to progress overall strength in prep for functional ambulation, pt endorsing inc dizziness with standing however /89. Facilitated seated rest break with min A for controlled descent, upon sitting pt appearing distressted & stating \"I need to lay back down\". Pt unable to describe symptoms at this time. Returned to supine with mod A & session terminated. Challenged pt to continue to progress bed mobility IND via self-boosting in bed, able to complete with cues. /97 upon return to supine, O2>90% on 1.5L O2 oxymask. Left with RN at beside.   OT Discharge Planning   OT Plan contact [facility] for PLOF, cog, ADL standing at EOB   OT Discharge Recommendation (DC Rec) Transitional Care Facility   OT Rationale for DC Rec unable to gather PLOF information on this date however per chart reveiw pt lives at [facility] & fairly IND at baseline. anticipate pt likely below functional baseline and would benefit from TCU stay to continue to progress cog, strength, and mobiliy for inc IND.   OT Brief overview of current status min A STS, mod A bed mob "   Total Session Time   Timed Code Treatment Minutes 9   Total Session Time (sum of timed and untimed services) 14

## 2023-12-16 NOTE — ED NOTES
HI. ED14  Pt's latest BP are as follows, checked 3x.  156/105,149/111, 156/102.  Thank you.      [facility]-RN  [id number]    -Above message sent to admitting team: Amando Rivera.

## 2023-12-16 NOTE — ED NOTES
"Admitting Team: Amando Rivera updated on patient's status. \"Hi. Patient has episodes of being confused, shouting for help and attempting to get up from bed.\"  Received a call back and advised to keep an eye and writer informed to keep him posted for any changes  "

## 2023-12-16 NOTE — ED NOTES
Patient BP is still on the following range: 161/114, 159/109, 168/106, 177/111.    -Above message sent to admitting team.

## 2023-12-16 NOTE — PROGRESS NOTES
Hours of care [id number]  Neuro: Alert to self. Confused, disoriented to place, time, and situation.   Cardiac: Dual-paced. VSS.   Respiratory: Sating >94% on RA.  GI/: Adequate urine output. BM X1  Diet/appetite: Regular diet with mild thick liquids. Poor oral intake. 1:1 supervision with meals.  Activity:  Assist of 1, up to chair and in halls.  Pain: Denies pain.  Skin: No new deficits noted.  LDA's: R PIV SL.    Plan: Admit to 7C. OT/PT. Continue with POC. Notify [facility]OON 4 with changes.

## 2023-12-16 NOTE — ED NOTES
Hi. Patient has been more confused. removed all his IV line, new line has been inserted. Frequently removing his clothes, primofit. Rested only for a very short period of time.     -Updated to admitting team: Amando Rivera.

## 2023-12-16 NOTE — H&P
[facility]    History and Physical - Medicine Service, [facility] TEAM 4       Date of Admission:  12/15/2023    Assessment & Plan      Jc Cloud is a 89 year old male admitted on 12/15/2023. He has a history of coronary artery disease status post CABG, A-fib and AV block on Eliquis, hypertension, depression and anxiety, sleep apnea, essential tremor, mild cognitive impairment, chronic kidney disease and presented  after being found confused this morning at his assisted living facility and was found to have acute toxic metabolic encephalopathy and acute hypoxic respiratory failure likely due to urinary tract infection with possible superimposed pneumonia.    # Acute toxic metabolic encephalopathy  # Urinary tract infection  # Community-acquired pneumonia vs aspiration pneumonia  Patient is known to have mild cognitive impairment at baseline, though his son states that he is usually a good historian and able to take care of most of his ADLs independently.  He does not previously had admissions for confusion.  This is most likely related to his urinary tract infection and possible pneumonia.  He may have had an aspiration event when he was found down earlier this morning.  Unlikely to be stroke with nonfocal neurologic exam.  His electrolytes were normal, and no contributing medications.  He does have a chronic history of alcohol use with 1 drink at night.  Low suspicion that this is contributing.   -Follow urine and blood cultures from 12/15/2023  -Status post Zosyn x 1 on 12/15  -Start ceftriaxone 2 g daily 12/15; hold on atypical pneumonia coverage for now  -Neurochecks every 4 hours, frequent reorientation  -PT/OT consults  -SLP cleared patient for regular diet with supervision  -Hold potential contributing PTA meds including trazodone and gabapentin    # Acute hypoxic respiratory failure, improving  Possible etiology of his hypoxia on arrival includes  community-acquired pneumonia or aspiration pneumonia.  While BNP and troponin are elevated, echocardiogram obtained by ED showed normal systolic function and no evidence of volume overload so I feel heart failure is less likely.  There is no evidence of volume overload on exam.  I was able to wean him off oxygen while in the room with him so I think that he may easily weaned to room air.  -Pneumonia treatment as above  -Supplemental oxygen as needed to maintain sats greater than 88%  -Monitor intake and output, daily weights    # Coronary artery disease status post CABG  # Hypertension  # History of orthostatic hypotension  Possible that patient's orthostatic hypotension contributed to his falls and confusion, especially in the setting of infection.  TTE was obtained with left ventricular ejection fraction of 50% and evidence of pulmonary hypertension.  -Continue PTA metoprolol 12.5 mg twice daily  -Continue midodrine 5 mg oral twice daily  -Consider orthostatic vital signs    # Atrial fibrillation  # Second-degree AV block  # Status post biventricular pacemaker  Cardiac pacemaker was interrogated in the emergency department and was normal.   -hold PTA Eliquis for 1 day and consider resumption on hospital day 1 in case of slow bleed after fall    # Essential tremor  -Continue PTA primidone    # Depression/anxiety  -Continue PTA mirtazapine        Diet: Regular Diet Adult    DVT Prophylaxis: as above  Covarrubias Catheter: Not present  Fluids: none  Lines: None     Cardiac Monitoring: None  Code Status: No CPR- Pre-arrest intubation OK      Clinically Significant Risk Factors Present on Admission        # Hyperkalemia: Highest K = 6.1 mmol/L in last 2 days, will monitor as appropriate   # Hypocalcemia: Lowest Ca = 7.5 mg/dL in last 2 days, will monitor and replace as appropriate     # Hypoalbuminemia: Lowest albumin = 3.4 g/dL at 12/15/2023  8:51 AM, will monitor as appropriate  # Drug Induced Coagulation Defect: home  medication list includes an anticoagulant medication    # Hypertension: Noted on problem list           # History of CABG: noted on surgical history       Disposition Plan      Expected Discharge Date: 12/17/2023                The patient's care was discussed with the  attending physician. .      Dar Zaragoza MD  Medicine Service, [facility] TEAM [address] [place] [address]  Securely message with [employer] (more info)  Text page via [facility] Paging/Directory   See signed in provider for up to date coverage information  ______________________________________________________________________    Chief Complaint   Confusion    History is obtained from the patient and patient's son.    History of Present Illness   Jc Cloud is a 89 year old male who has a history of coronary artery disease status post CABG, A-fib and AV block on Eliquis, hypertension, depression and anxiety, sleep apnea, essential tremor, mild cognitive impairment, chronic kidney disease.  He presented to the ED for evaluation after he was found to be on the ground confused at his assisted living facility.    History is limited as patient does not remember what happened to him this morning.  Patient's son states that patient is usually able to take care of himself at his assisted living facility with minimal help.  He does have someone to help administer his medications.  He is able to get around independently.  He does not have any history of prior hospitalizations for encephalopathy.     Paramedics states that he lives in assisted living facility, has dementia at baseline but is otherwise able to get up and around and care for himself.  This morning staff member checked in on him for a med Pass and found him down on the ground and confused.  911 was called and had up and EMS responded.  On scene he was noted to be hypoxic to 80s, when he is not normally hypoxic at all.  He was put on cardiac monitoring, is paced but medics  did find heart rate variable 100-120s.  He was placed on supplemental oxygen via facemask at 6 LPM; despite this he remained tachypneic with respiratory rate in 30s. Medics had difficulty obtaining blood pressures, but did note systolic in the 120s.  Blood glucose was normal at 119. His last known well was at 9 PM last night.       ED course: On arrival to emergency department vital signs were within normal limits with exception of hypoxia for which patient was placed on supplemental oxygen.  He was quickly weaned to 3 L nasal cannula.  Electrolytes were grossly normal and at patient's baseline, as well as CBC.  CT head showed nothing acute and it was felt that neurologic exam was nonfocal and there was low concern for stroke.  Urinalysis showed blood, leukocyte esterase, white blood cells and bacteria.  Chest x-ray showed some right-sided perihilar opacities as well as a small left pleural effusion.  Patient's pacemaker was interrogated and was normal.  Troponin and BNP were elevated.  TTE was ordered, 1 L bolus was given.  Blood and urine cultures were obtained.  Patient was started on Zosyn.    On my interview patient denies any cough, fevers, abdominal pain, dysuria, suprapubic pain.     Past Medical History    Past Medical History:   Diagnosis Date    Alcohol abuse     Anxiety     Atrial fibrillation (H)     Closed fracture of multiple ribs of left side with routine healing     Cognitive impairment     Colon, diverticulosis     Coronary artery disease     Falls frequently     Hyperlipidemia     Intestinal adhesions with obstruction (H)     Neuropathy     Orthostatic hypotension     GLORIA (obstructive sleep apnea)     Pacemaker     Primary hypertension     Primary osteoarthritis involving multiple joints     Recurrent major depressive disorder, in remission (H24)     Sleep apnea        Past Surgical History   Past Surgical History:   Procedure Laterality Date    ADENOIDECTOMY      BYPASS GRAFT ARTERY CORONARY       CATARACT IOL, RT/LT      HERNIA REPAIR      IR THORACENTESIS  4/9/2014    OPEN REDUCTION INTERNAL FIXATION WRIST Right 1/3/2020    Procedure: OPEN REDUCTION INTERNAL FIXATION RIGHT DISTAL RADIUS FRACTURE;  Surgeon: Susan Mcdonald MD;  Location:  OR       Prior to Admission Medications   Prior to Admission Medications   Prescriptions Last Dose Informant Patient Reported? Taking?   ELIQUIS ANTICOAGULANT 2.5 MG tablet 12/14/2023  No Yes   Sig: TAKE 1 TABLET BY MOUTH TWICE DAILY   Vitamin D3 (VITAMIN D) 10 MCG (400 UNIT) tablet 12/14/2023  No Yes   Sig: TAKE 1 TABLET BY MOUTH ONCE DAILY   acetaminophen (ACETAMINOPHEN EXTRA STRENGTH) 500 MG tablet 12/14/2023  No Yes   Sig: Take 2 tablets (1,000 mg) by mouth 2 times daily. May also take 2 tablets (1,000 mg) daily as needed for mild pain.   gabapentin (NEURONTIN) 300 MG capsule 12/14/2023  No Yes   Sig: TAKE 1 CAPSULE BY MOUTH TWICE DAILY   metoprolol succinate ER (TOPROL XL) 25 MG 24 hr tablet 12/14/2023  No Yes   Sig: Take 0.5 tablets (12.5 mg) by mouth 2 times daily   midodrine (PROAMATINE) 5 MG tablet 12/14/2023  No Yes   Sig: TAKE 1 TABLET BY MOUTH TWICE DAILY   mirtazapine (REMERON) 15 MG tablet 12/14/2023  No Yes   Sig: TAKE 1 TABLET BY MOUTH AT BEDTIME   polyethylene glycol (MIRALAX) 17 GM/Dose powder 12/14/2023  No Yes   Sig: MIX 17GM OF POWDER IN 8OZ OF WATER UNTIL COMPLETELY DISSOLVED. DRINK SOLUTION BY MOUTH ONCE DAILY.   primidone (MYSOLINE) 50 MG tablet 12/14/2023  No Yes   Sig: TAKE ONE-HALF TABLET (25 MG) BY MOUTH TWICE DAILY   traZODone (DESYREL) 50 MG tablet 12/14/2023  No Yes   Sig: TAKE 1 TABLET BY MOUTH AT BEDTIME AS NEEDED      Facility-Administered Medications: None           Physical Exam   Vital Signs: Temp: 98  F (36.7  C) Temp src: Oral BP: 125/85 Pulse: 59   Resp: 18 SpO2: 96 % O2 Device: Nasal cannula Oxygen Delivery: 3 LPM  Weight: 183 lbs 1.6 oz    General Appearance: Elderly male in no acute distress, resting in bed  Eyes:  Extraocular movements intact, clear sclera  HEENT: Dry lips, head is atraumatic  Respiratory: Normal work of breathing, decreased breath sounds in the left lung base, no crackles  Cardiovascular: Regular rate and rhythm, no murmurs  GI: Soft, nontender, nondistended throughout including in the suprapubic area  Skin: No obvious rashes or lesions on skin  Musculoskeletal: No lower extremity edema, no gross deformities, no obvious trauma  Neurologic: Oriented to self alone, moves all extremities spontaneously, no cranial nerve deficits      Medical Decision Making       Please see A&P for additional details of medical decision making.      Data     I have personally reviewed the following data over the past 24 hrs:    8.1  \   15.9   / 156     144 111 (H) 30.7 (H) /  119 (H)   4.6 24 1.46 (H) \     ALT: 20 AST: 34 AP: 99 TBILI: 0.6   ALB: 3.4 (L) TOT PROTEIN: 6.0 (L) LIPASE: N/A     Trop: 63 (H) BNP: 4,428 (H)     Procal: N/A CRP: 42.20 (H) Lactic Acid: 1.7       INR:  1.13 PTT:  34   D-dimer:  N/A Fibrinogen:  N/A       Imaging results reviewed over the past 24 hrs:   Recent Results (from the past 24 hour(s))   XR Chest Port 1 View    Narrative    EXAM: XR CHEST PORT 1 VIEW 12/15/2023 9:09 AM    DEMOGRAPHICS: 89 years Male    INDICATION: fall. Hypoxic on seen, requiring supplemental oxygen.  Tachypnea.    COMPARISON: None    TECHNIQUE: Single portable AP view of the chest.    FINDINGS:   Median sternotomy wires, most inferior wire is fractured. Mediastinal  surgical clips. Left chest wall implanted, dual-chamber pacemaker.    Trachea is midline. Cardiac silhouette is mildly enlarged. No  pneumothorax. Small left pleural effusion. Mild perihilar and  bibasilar streaky/patchy opacities, greatest in the right lower lobe.  Upper abdomen exhibits nonspecific bowel gas pattern. Chronic  appearing deformity of the lateral right sixth rib. Chronic  deformities of posterior left ribs.      Impression    IMPRESSION:   1.   Perihilar and bibasilar streaky/patchy opacities, greatest in the  right lower lobe. Findings may represent atelectasis, pulmonary edema,  aspiration, however cannot exclude infection.  2.  Likely small left pleural effusion.  3.  Chronic appearing rib deformities bilaterally.     I have personally reviewed the examination and initial interpretation  and I agree with the findings.    SUSANNA ZELAYA MD         SYSTEM ID:  [id number]   CT Head w/o Contrast    Narrative    CT HEAD W/O CONTRAST 12/15/2023 9:30 AM    History: fall     Comparison: CT 12/28/2019    Technique: Using multidetector thin collimation helical acquisition  technique, axial, coronal and sagittal CT images from the skull base  to the vertex were obtained without intravenous contrast.   (topogram) image(s) also obtained and reviewed.    Findings: There is no intracranial hemorrhage, mass effect, or midline  shift. Gray/white matter differentiation in both cerebral hemispheres  is preserved. Possible chronic infarction right lateral to the right  caudate head. Moderate nonspecific bilateral periventricular white  matter hypodensities. Mild diffuse cerebral atrophy. Ventricles are  proportionate to the cerebral sulci. The basal cisterns are clear.  Vascular calcifications of the carotid siphons and vertebral arteries.    The bony calvaria and the bones of the skull base are normal. Right  maxillary wall thickening. Scattered mucosal thickening of the ethmoid  air cells. Left greater than right soft tissue debris within the  external auditory canals, presumably cerumen. Bilateral pseudophakia.      Impression    Impression:  1. No acute intracranial pathology.   2. Moderate sequela of chronic small vessel ischemic disease and mild  generalized cerebral parenchymal volume loss.    I have personally reviewed the examination and initial interpretation  and I agree with the findings.    MANJU HOOD MD         SYSTEM ID:  [id number]   Echocardiogram  Complete   Result Value    LVEF  50-55% (borderline)    [facility]    [id number]  [id number]  [id number]  [id number]^CODY^SAMARA^GEOVANNI     [facility],[place]  Echocardiography Laboratory  [address] [place] [address]     Name: LAUREN MILLS  MRN: [id number]  : 1931  Study Date: 12/15/2023 01:17 PM  Age: 89 yrs  Gender: Male  Patient Location: [facility]  Reason For Study: Syncope  Ordering Physician: SAMARA ROSS  Performed By: Camila Mason     BSA: 2.1 m2  Height: 74 in  Weight: 183 lb  HR: 86  BP: 120/72 mmHg  ______________________________________________________________________________  Procedure  Complete Portable Echo Adult.  ______________________________________________________________________________  Interpretation Summary  Left ventricular function is decreased. The ejection fraction is 50-55%  (borderline).  Global right ventricular function is borderline reduced.  Mild to moderate mitral insufficiency is present.  Mild aortic insufficiency is present.  Moderate tricuspid insufficiency is present. The right ventricular systolic  pressure is 45mmHg above the right atrial pressure. Pulmonary hypertension is  present.  No pericardial effusion is present.  Previous study not available for comparison.  ______________________________________________________________________________  Left Ventricle  Left ventricular size is normal. Left ventricular function is decreased. The  ejection fraction is 50-55% (borderline). Abnormal septal motion consistent  with pacemaker is present.     Right Ventricle  Global right ventricular function is borderline reduced. A pacemaker lead is  noted in the right ventricle.     Mitral Valve  Mild to moderate mitral insufficiency is present.     Aortic Valve  Moderate aortic valve calcification is present. Mild aortic insufficiency is  present.     Tricuspid Valve  Moderate tricuspid insufficiency is present. The right ventricular  systolic  pressure is 45mmHg above the right atrial pressure. Pulmonary hypertension is  present.     Pulmonic Valve  The pulmonic valve is normal. Trace pulmonic insufficiency is present.     Vessels  Proximal ascending aorta is borderline dilated measuring 4cm. The inferior  vena cava cannot be assessed.     Pericardium  No pericardial effusion is present.     Compared to Previous Study  Previous study not available for comparison.     ______________________________________________________________________________  MMode/2D Measurements & Calculations  asc Aorta Diam: 4.0 cm     EF(MOD-bp): 48.7 %  Asc Ao diam index BSA (cm/m2): 1.9     Asc Ao diam index Ht(cm/m): 2.1     Doppler Measurements & Calculations  PA acc time: 0.09 sec  TR max bibi: 383.7 cm/sec  TR max P.9 mmHg     ______________________________________________________________________________  Report approved by: Vitaly Nj 12/15/2023 02:49 PM

## 2023-12-16 NOTE — PROGRESS NOTES
[facility]    Progress Note - Medicine Service, MAROON TEAM 4       Date of Admission:  12/15/2023    Assessment & Plan   Jc Cloud is a 89 year old male admitted on 12/15/2023. He has a history of coronary artery disease status post CABG, A-fib and AV block on Eliquis, hypertension, depression and anxiety, sleep apnea, essential tremor, mild cognitive impairment, chronic kidney disease and presented  after being found confused this morning at his assisted living facility and was found to have acute toxic metabolic encephalopathy and acute hypoxic respiratory failure likely due to urinary tract infection with possible superimposed pneumonia.     Changes today:  -Continue ceftriaxone daily  -Add melatonin at night for sleep  -Okay to resume apixaban    # Acute toxic metabolic encephalopathy  # Urinary tract infection  # Community-acquired pneumonia vs aspiration pneumonia  Patient is known to have mild cognitive impairment at baseline, though his son states that he is usually a good historian and able to take care of most of his ADLs independently.  He does not previously had admissions for confusion.  This is most likely related to his urinary tract infection and possible pneumonia.  He may have had an aspiration event when he was found down earlier this morning.  Unlikely to be stroke with nonfocal neurologic exam.  His electrolytes were normal, and no contributing medications.  He does have a chronic history of alcohol use with 1 drink at night.  Low suspicion that this is contributing.    -Follow urine and blood cultures from 12/15/2023  -Status post Zosyn x 1 on 12/15  -Start ceftriaxone 2 g daily 12/15; hold on atypical pneumonia coverage for now  -Neurochecks every 4 hours, frequent reorientation  -PT/OT consults  -SLP cleared patient for regular diet with supervision  -Hold potential contributing PTA meds including trazodone and gabapentin     # Acute  hypoxic respiratory failure, improving  Possible etiology of his hypoxia on arrival includes community-acquired pneumonia or aspiration pneumonia.  While BNP and troponin are elevated, echocardiogram obtained by ED showed normal systolic function and no evidence of volume overload so I feel heart failure is less likely.  There is no evidence of volume overload on exam.  I was able to wean him off oxygen while in the room with him so I think that he may easily weaned to room air.  -Pneumonia treatment as above  -Supplemental oxygen as needed to maintain sats greater than 88%  -Monitor intake and output, daily weights     # Coronary artery disease status post CABG  # Hypertension  # History of orthostatic hypotension  Possible that patient's orthostatic hypotension contributed to his falls and confusion, especially in the setting of infection.  TTE was obtained with left ventricular ejection fraction of 50% and evidence of pulmonary hypertension.  -Continue PTA metoprolol 12.5 mg twice daily  -Continue midodrine 5 mg oral twice daily  -Consider orthostatic vital signs     # Atrial fibrillation  # Second-degree AV block  # Status post biventricular pacemaker  Cardiac pacemaker was interrogated in the emergency department and was normal.   -Held apixaban on admission, restarted on 12/16     # Essential tremor  -Continue PTA primidone     # Depression/anxiety  -Continue PTA mirtazapine        Diet: Regular Diet Adult    DVT Prophylaxis: as above  Covarrubias Catheter: Not present  Fluids: none  Lines: None     Cardiac Monitoring: None  Code Status: No CPR- Pre-arrest intubation OK    Clinically Significant Risk Factors Present on Admission        # Hyperkalemia: Highest K = 6.1 mmol/L in last 2 days, will monitor as appropriate   # Hypocalcemia: Lowest Ca = 7.5 mg/dL in last 2 days, will monitor and replace as appropriate     # Hypoalbuminemia: Lowest albumin = 3.4 g/dL at 12/15/2023  8:51 AM, will monitor as appropriate  #  Drug Induced Coagulation Defect: home medication list includes an anticoagulant medication    # Hypertension: Noted on problem list           # History of CABG: noted on surgical history       Disposition Plan      Expected Discharge Date: 12/20/2023        Discharge Comments: once UTI is treated, confusion resolved and therapy has recs for placement        The patient's care was discussed with the  attending physician .    Dar Zaragoza MD  Medicine Service, [facility] TEAM [address] [place] [address]  Securely message with Vocera (more info)  Text page via [employer] Paging/Directory   See signed in provider for up to date coverage information  ______________________________________________________________________    Interval History   Overnight patient continues to be confused, pulling at lines and is disoriented.  He is confused during the interview.  When asked, he denies shortness of breath, cough, abdominal pain.    Physical Exam   Vital Signs: Temp: 98.8  F (37.1  C) Temp src: Axillary BP: (!) 146/117 Pulse: 88   Resp: 20 SpO2: 93 % O2 Device: Oxymask Oxygen Delivery: 5 LPM  Weight: 183 lbs 1.6 oz    General Appearance: Elderly male in no acute distress, resting in bed  Eyes: Extraocular movements intact, clear sclera  HEENT: Dry lips, head is atraumatic  Respiratory: Normal work of breathing, decreased breath sounds in the left lung base, no crackles  Cardiovascular: Regular rate and rhythm, no murmurs  GI: Soft, nontender, nondistended throughout  Skin: No obvious rashes or lesions on skin  Musculoskeletal: No lower extremity edema, no gross deformities, no obvious trauma  Neurologic: Oriented to self alone, able to follow one-step commands, no cranial nerve or other focal neurologic deficits    Medical Decision Making       Please see A&P for additional details of medical decision making.      Data     I have personally reviewed the following data over the past 24 hrs:    13.1 (H)   \   16.4   / 143 (L)     143 110 (H) 30.0 (H) /  121 (H)   4.8 20 (L) 1.51 (H) \     Trop: 67 (H) BNP: N/A       Imaging results reviewed over the past 24 hrs:   Recent Results (from the past 24 hour(s))   Echocardiogram Complete   Result Value    LVEF  50-55% (borderline)    [facility]    [id number]  [id number]  [id number]  [id number]^CODY^SAMARA^GEOVANNI     [facility]  Echocardiography Laboratory  [address] [place] [id number]     Name: LAUREN MILLS  MRN: [id number]  : 1931  Study Date: 12/15/2023 01:17 PM  Age: 89 yrs  Gender: Male  Patient Location: [facility]  Reason For Study: Syncope  Ordering Physician: SAMARA ROSS  Performed By: Camila Mason     BSA: 2.1 m2  Height: 74 in  Weight: 183 lb  HR: 86  BP: 120/72 mmHg  ______________________________________________________________________________  Procedure  Complete Portable Echo Adult.  ______________________________________________________________________________  Interpretation Summary  Left ventricular function is decreased. The ejection fraction is 50-55%  (borderline).  Global right ventricular function is borderline reduced.  Mild to moderate mitral insufficiency is present.  Mild aortic insufficiency is present.  Moderate tricuspid insufficiency is present. The right ventricular systolic  pressure is 45mmHg above the right atrial pressure. Pulmonary hypertension is  present.  No pericardial effusion is present.  Previous study not available for comparison.  ______________________________________________________________________________  Left Ventricle  Left ventricular size is normal. Left ventricular function is decreased. The  ejection fraction is 50-55% (borderline). Abnormal septal motion consistent  with pacemaker is present.     Right Ventricle  Global right ventricular function is borderline reduced. A pacemaker lead is  noted in the right ventricle.     Mitral Valve  Mild to moderate mitral  insufficiency is present.     Aortic Valve  Moderate aortic valve calcification is present. Mild aortic insufficiency is  present.     Tricuspid Valve  Moderate tricuspid insufficiency is present. The right ventricular systolic  pressure is 45mmHg above the right atrial pressure. Pulmonary hypertension is  present.     Pulmonic Valve  The pulmonic valve is normal. Trace pulmonic insufficiency is present.     Vessels  Proximal ascending aorta is borderline dilated measuring 4cm. The inferior  vena cava cannot be assessed.     Pericardium  No pericardial effusion is present.     Compared to Previous Study  Previous study not available for comparison.     ______________________________________________________________________________  MMode/2D Measurements & Calculations  asc Aorta Diam: 4.0 cm     EF(MOD-bp): 48.7 %  Asc Ao diam index BSA (cm/m2): 1.9     Asc Ao diam index Ht(cm/m): 2.1     Doppler Measurements & Calculations  PA acc time: 0.09 sec  TR max bibi: 383.7 cm/sec  TR max P.9 mmHg     ______________________________________________________________________________  Report approved by: Vitaly Nj 12/15/2023 02:49 PM

## 2023-12-17 NOTE — PROVIDER NOTIFICATION
Provider Leena De Anda notified the patient is becoming agitated and restless. She was asked to assess the patient at the bedside.

## 2023-12-17 NOTE — PROVIDER NOTIFICATION
Provider Leena De Anda notified the patient is at 90% oxygen on 6L per oxy mask. He is also shivering but is afebrile.

## 2023-12-17 NOTE — PROVIDER NOTIFICATION
Provider Leena De Anda Notified via [employer] that the patient is requiring 5L via oxy mask to maintain 91-92% oxygen.

## 2023-12-17 NOTE — PLAN OF CARE
"/65 (BP Location: Right arm)   Pulse 69   Temp 99.2  F (37.3  C) (Axillary)   Resp (!) 36   Wt 75.1 kg (165 lb 8 oz)   SpO2 90%   BMI 21.84 kg/m       Pt is requiring 6 L per oxy mask to maintain oxygen between 88-90 %, RR elevated, AOVSS. Pt is oriented to self only, requires 1:1 with meals and has a sitter at the bedside. He has a poor appetite and needs encouragement to drink more. He is ambulating to the bathroom with assist of 1 and voiding spontaneously. PIV is saline locked.Will continue to follow the plan of care.    Goal Outcome Evaluation:      Plan of Care Reviewed With: patient    Overall Patient Progress: declining      Problem: Adult Inpatient Plan of Care  Goal: Plan of Care Review  Description: The Plan of Care Review/Shift note should be completed every shift.  The Outcome Evaluation is a brief statement about your assessment that the patient is improving, declining, or no change.  This information will be displayed automatically on your shift  note.  Outcome: Not Progressing  Flowsheets (Taken 12/17/2023 1346)  Plan of Care Reviewed With: patient  Overall Patient Progress: declining  Goal: Patient-Specific Goal (Individualized)  Description: You can add care plan individualizations to a care plan. Examples of Individualization might be:  \"Parent requests to be called daily at 9am for status\", \"I have a hard time hearing out of my right ear\", or \"Do not touch me to wake me up as it startles  me\".  Outcome: Not Progressing  Goal: Absence of Hospital-Acquired Illness or Injury  Outcome: Not Progressing  Intervention: Identify and Manage Fall Risk  Recent Flowsheet Documentation  Taken 12/17/2023 1255 by Jeana Salvador, RN  Safety Promotion/Fall Prevention: safety round/check completed  Taken 12/17/2023 1100 by Jeana Salvador, RN  Safety Promotion/Fall Prevention: safety round/check completed  Taken 12/17/2023 0821 by Jeana Salvador, RN  Safety Promotion/Fall Prevention:   activity " supervised   assistive device/personal items within reach   clutter free environment maintained   increase visualization of patient   lighting adjusted   nonskid shoes/slippers when out of bed   patient and family education  Intervention: Prevent Skin Injury  Recent Flowsheet Documentation  Taken 12/17/2023 0821 by Jeana Salvador RN  Body Position: position changed independently  Skin Protection: adhesive use limited  Device Skin Pressure Protection:   absorbent pad utilized/changed   adhesive use limited  Intervention: Prevent and Manage VTE (Venous Thromboembolism) Risk  Recent Flowsheet Documentation  Taken 12/17/2023 0821 by Jeana Salvador RN  VTE Prevention/Management: SCDs (sequential compression devices) off  Intervention: Prevent Infection  Recent Flowsheet Documentation  Taken 12/17/2023 0821 by Jeana Salvador RN  Infection Prevention:   environmental surveillance performed   hand hygiene promoted   rest/sleep promoted   single patient room provided  Goal: Optimal Comfort and Wellbeing  Outcome: Not Progressing  Goal: Readiness for Transition of Care  Outcome: Not Progressing  Intervention: Mutually Develop Transition Plan  Recent Flowsheet Documentation  Taken 12/17/2023 1300 by Jeana Salvador RN  Equipment Currently Used at Home: walker, standard

## 2023-12-17 NOTE — CODE/RAPID RESPONSE
Rapid Response Team Note    Assessment   In assessment a rapid response was called on Jc Cloud due to acute hypoxic respiratory failure. This presentation is likely due to pneumonia, left pleural effusion and likely aspiration event. Bedside attendant noted patient experienced quite a bit of coughing while eating his eggs at breakfast. He has been tolerating thickened clear liquids all day without issue. CXR consistent with new R middle and lower lobe opacities likely reflecting aspiration.    Plan   - Started high flow 40LPM 50% FiO2 sating 93% at rest. Does desaturate with any activity. Appears comfortable watching football on TV. BIPAP could be tried, though would like to avoid if at all possible secondary to possible aspiration event.  - Diet changed to thickened clear liquids, SLP consult placed  - VBG, lactate, BMP, CBC, LFTs  - Lactate 2.3 likely due to hypoxia, anticipate will improve on high flow. Recheck lactate and VBG at 1930.  - Urine strep pneumo and legionella, sputum culture  - Agree with Zyprexa 2.5mg PO x 1. Could consider an additional dose either PO or IM if needed pending patient response.  - Recommend monitoring closely for withdrawal. Ordered thiamine, folate, multivitamin.  - Recommend resuming Gabapentin secondary to etoh use history (ordered).  - Continue current antibiotics  - Continue bedside sitter  -  The Internal Medicine primary team was able to be reached and they are in agreement with the above plan.  -  [place]: [place] [facility]  -  Reassessment and plan follow-up will be performed by the primary team    Jc is critically ill with acute hypoxic respiratory failure. These features are alarming and indicate that the patient is at imminent risk of life-threatening organ failure. Acute deterioration is being managed by the following critical interventions: oxygen by high flow nasal cannula    Total Critical Care time spent by me, excluding  procedures, was 55 minutes.  Cale Caldwell PA-C  [facility] [place] RRT [facility] Job Code Contact #[phone]  [facility] Paging/Directory    Hospital Course   Brief Summary of events leading to rapid response:   Called to see patient due to difficulty keeping O2 saturations above 87%, he did dip into 70s a few times. Patient pleasantly confused, removing his oxymask.    Admission Diagnosis:   Tremor [R25.1]  Hypoxia [R09.02]  Acute lower UTI [N39.0]  Anticoagulated [Z79.01]  Fall, initial encounter [W19.XXXA]  History of dementia [Z86.59]  Altered mental status, unspecified altered mental status type [R41.82]  Aspiration pneumonia of right lower lobe, unspecified aspiration pneumonia type (H) [J69.0]    Physical Exam   Temp: 98.4  F (36.9  C) Temp  Min: 97.3  F (36.3  C)  Max: 99.2  F (37.3  C)  Resp: 20 Resp  Min: 20  Max: 36  SpO2: 90 % SpO2  Min: 86 %  Max: 94 %  Pulse: 82 Pulse  Min: 63  Max: 85    No data recorded  BP: (!) 159/93 Systolic (24hrs), Av , Min:100 , Max:179   Diastolic (24hrs), Av, Min:65, Max:114     I/Os: I/O last 3 completed shifts:  In: 420 [P.O.:420]  Out: 100 [Urine:100]     Exam:   General: in no acute distress  Mental Status: baseline mental status, has been pleasantly confused  Lungs: Diminished L lung base, no crackles  Cardiac: Irregularly irregular rate and rhythm    Significant Results and Procedures   Lactic Acid:   Recent Labs   Lab Test 12/15/23  0856 12/15/23  0851   LACT 1.7 1.8     CBC:   Recent Labs   Lab Test 23  0605 23  0656 12/15/23  0851   WBC 10.9 13.1* 8.1   HGB 14.6 16.4 15.9   HCT 44.9 51.6 52.1   * 143* 156        Sepsis Evaluation   The patient is known to have an infection.    Anti-infectives (From now, onward)      Start     Dose/Rate Route Frequency Ordered Stop    23 1510  azithromycin (ZITHROMAX) tablet 500 mg         500 mg Oral DAILY 23 1506 23 0759    12/15/23 1600  cefTRIAXone (ROCEPHIN) 2 g vial to attach to  ml bag for  ADULTS or NS 50 ml bag for PEDS         2 g  over 30 Minutes Intravenous EVERY 24 HOURS 12/15/23 1425            Current antibiotic coverage is appropriate for source of infection.

## 2023-12-17 NOTE — PLAN OF CARE
Goal Outcome Evaluation:       Confused anxious desat on Oxymask. RRT called. Pt requiring high flow or Bipap to maintain Sats. Pt to transfer to higher level of care.

## 2023-12-17 NOTE — PLAN OF CARE
Goal Outcome Evaluation:    BP (!) 131/94 (BP Location: Left arm, Patient Position: Supine, Cuff Size: Adult Regular)   Pulse 79   Temp 97.8  F (36.6  C) (Oral)   Resp 16   Wt 83.1 kg (183 lb 1.6 oz)   SpO2 93%   BMI 24.16 kg/m      Alert, oriented to self. Confused, calling and tried to get out of bed, 1:1  at bedside. Admitted for increased confusion, acute hypoxic respiratory failure, UTI and community acquired PNA. On 2LNC O2 supplement, denied pain, poor appetite, pills with applesauce, mild thickened fluid. Needs assist with feeding.

## 2023-12-17 NOTE — PLAN OF CARE
"Goal Outcome Evaluation:       Assumedd cares from 23:00 to 07:30      Blood pressure (!) 140/86, pulse 63, temperature 98.2  F (36.8  C), temperature source Oral, resp. rate 20, weight 83.1 kg (183 lb 1.6 oz), SpO2 93%.    Special code status pre arrest intubation No CPR.  Pt is alert and oriented to self. He is on 2L 02 NC with 02 saturations > 92%. Neuro checked q 4 hours He is oriented to self only and can make needs known. He has 1:1 sitter at the bedside. BP is paulo as recorded above. Right PIV infiltrated or came out and new left PIV placed. No issues this shift.      Problem: Adult Inpatient Plan of Care  Goal: Plan of Care Review  Description: The Plan of Care Review/Shift note should be completed every shift.  The Outcome Evaluation is a brief statement about your assessment that the patient is improving, declining, or no change.  This information will be displayed automatically on your shift  note.  12/17/2023 0700 by Winnie Zhu RN  Outcome: Progressing  12/17/2023 0659 by Winnie Zhu RN  Outcome: Progressing     Problem: Adult Inpatient Plan of Care  Goal: Patient-Specific Goal (Individualized)  Description: You can add care plan individualizations to a care plan. Examples of Individualization might be:  \"Parent requests to be called daily at 9am for status\", \"I have a hard time hearing out of my right ear\", or \"Do not touch me to wake me up as it startles  me\".  12/17/2023 0700 by Winnie Zhu RN  Outcome: Progressing  12/17/2023 0659 by Winnie Zhu RN  Outcome: Progressing     Problem: Adult Inpatient Plan of Care  Goal: Absence of Hospital-Acquired Illness or Injury  Intervention: Identify and Manage Fall Risk  Recent Flowsheet Documentation  Taken 12/17/2023 0000 by Winnie Zhu RN  Safety Promotion/Fall Prevention:   activity supervised   clutter free environment maintained   bedside attendant   lighting adjusted   nonskid shoes/slippers when out of bed   patient " and family education   room near nurse's station   room organization consistent   safety round/check completed     Problem: Adult Inpatient Plan of Care  Goal: Absence of Hospital-Acquired Illness or Injury  12/17/2023 0700 by Winnie Zhu RN  Outcome: Progressing  12/17/2023 0659 by Winnie Zhu RN  Outcome: Progressing  Intervention: Identify and Manage Fall Risk  Recent Flowsheet Documentation  Taken 12/17/2023 0000 by Winnie Zhu RN  Safety Promotion/Fall Prevention:   activity supervised   clutter free environment maintained   bedside attendant   lighting adjusted   nonskid shoes/slippers when out of bed   patient and family education   room near nurse's station   room organization consistent   safety round/check completed  Intervention: Prevent Skin Injury  Recent Flowsheet Documentation  Taken 12/17/2023 0000 by Winnie Zhu RN  Body Position: position changed independently  Skin Protection: adhesive use limited  Device Skin Pressure Protection:   absorbent pad utilized/changed   adhesive use limited  Intervention: Prevent Infection  Recent Flowsheet Documentation  Taken 12/17/2023 0000 by Winnie Zhu RN  Infection Prevention:   cohorting utilized   environmental surveillance performed   visitors restricted/screened   single patient room provided   rest/sleep promoted   personal protective equipment utilized   hand hygiene promoted   equipment surfaces disinfected     Problem: Adult Inpatient Plan of Care  Goal: Absence of Hospital-Acquired Illness or Injury  Intervention: Prevent Infection  Recent Flowsheet Documentation  Taken 12/17/2023 0000 by Winnie Zhu RN  Infection Prevention:   cohorting utilized   environmental surveillance performed   visitors restricted/screened   single patient room provided   rest/sleep promoted   personal protective equipment utilized   hand hygiene promoted   equipment surfaces disinfected     Problem: Adult Inpatient Plan of Care  Goal:  Optimal Comfort and Wellbeing  12/17/2023 0700 by Winnie Zhu RN  Outcome: Progressing  12/17/2023 0659 by Winnie Zhu RN  Outcome: Progressing     Problem: Adult Inpatient Plan of Care  Goal: Optimal Comfort and Wellbeing  12/17/2023 0700 by Winnie Zhu RN  Outcome: Progressing  12/17/2023 0659 by Winnie Zhu RN  Outcome: Progressing

## 2023-12-17 NOTE — PROGRESS NOTES
" 12/17/23 0929   Appointment Info   Signing Clinician's Name / Credentials (PT) Charlene Morales, PT, DPT   Living Environment   People in Home facility resident   Current Living Arrangements assisted living   Self-Care   Usual Activity Tolerance good   Current Activity Tolerance poor   Fall history within last six months yes   Number of times patient has fallen within last six months   (at least 1 as found on ground at [facility], unclear if other additional recent falls)   Activity/Exercise/Self-Care Comment Per chart review, pt found on floor of his unit at [facility]. Currently, pt unable to provide any info about living environment or PLOF. Per chart review, appears pt is ind or mod ind with mobility and ADLs at home [facility]. Per chart, \"Patient is known to have mild cognitive impairment at baseline, though his son states that he is usually a good historian and able to take care of most of his ADLs independently.\" Currently pt unable to answer most questions about services at [facility] but does endorse that he gets assist with med management. Patient reports he does not use an AD for ambulation at baseline but later when shown walker pt endorses that he has used one before, will need to confirm baseline mobility status as pt is unreliable historian currently.   General Information   Onset of Illness/Injury or Date of Surgery 12/15/23   Referring Physician Dar Zaragoza MD   Patient/Family Therapy Goals Statement (PT) not stated   Pertinent History of Current Problem (include personal factors and/or comorbidities that impact the POC) Patient admitted 12/15 after being found on floor of his [facility] unit by staff. Found to have acute toxic metabolic encephalopathy and acute hypoxic respiratory failure likely due to urinary tract infection with possible superimposed pneumonia. PMH significant for history of coronary artery disease status post CABG, A-fib and AV block on Eliquis, hypertension, depression and anxiety, sleep apnea, essential " tremor, mild cognitive impairment, chronic kidney disease.   Existing Precautions/Restrictions fall;oxygen therapy device and L/min  (2-3L via NC at time of PT assessment)   Cognition   Orientation Status (Cognition) oriented to;person   Follows Commands (Cognition) follows one-step commands;75-90% accuracy   Cognitive Status Comments Not able to state where he lives or where he is currently or the current month, year or what holiday is coming up. Oriented pt to place, situation and time.   Pain Assessment   Patient Currently in Pain   (did not state)   Posture    Posture Forward head position;Protracted shoulders;Kyphosis   Range of Motion (ROM)   ROM Comment LE ROM appears grossly WFL with functional mobility   Strength (Manual Muscle Testing)   Strength Comments Pt demonstrates some trunk and LE weakness with bed mobility and and transfers   Bed Mobility   Comment, (Bed Mobility) Needs cues, Jacklyn and bedrail for supine>sit from flat bed   Transfers   Comment, (Transfers) Sit>stand from elevated EOB with Jacklyn support moreso for stability than power assist   Gait/Stairs (Locomotion)   Comment, (Gait/Stairs) Patient able to take 2 side steps at EOB with HHA of PT/Jacklyn, fatigues quickly   Balance   Balance Comments Needs 1-2 UE support for standing/ dynamic balance   Clinical Impression   Criteria for Skilled Therapeutic Intervention Yes, treatment indicated   PT Diagnosis (PT) decreased functional mobility and independence   Influenced by the following impairments impaired balance, decreased activity tolerance, respiratory status/new O2 needs, decreased strength   Functional limitations due to impairments difficulty/decreased independence with bed mobility transfers, ambulation   Clinical Presentation (PT Evaluation Complexity) stable   Clinical Presentation Rationale clinical judgement   Clinical Decision Making (Complexity) low complexity   Planned Therapy Interventions (PT) balance training;bed mobility  training;cryotherapy;gait training;home exercise program;neuromuscular re-education;patient/family education;ROM (range of motion);stair training;strengthening;motor coordination training;stretching;transfer training;progressive activity/exercise;home program guidelines   Risk & Benefits of therapy have been explained evaluation/treatment results reviewed;care plan/treatment goals reviewed;risks/benefits reviewed;current/potential barriers reviewed;participants voiced agreement with care plan;participants included;patient   Clinical Impression Comments Patient is below baseline mobility, currently needing Ax1 for all mobility and fatigues quickly. He will benefit from continued skilled therapies in hospital and after discharge to progress toward baseline.   PT Total Evaluation Time   PT Gulshan Low Complexity Minutes (26083) 10   Physical Therapy Goals   PT Frequency 5x/week   PT Predicted Duration/Target Date for Goal Attainment 01/02/24   PT Goals Bed Mobility;Transfers;Gait   PT: Bed Mobility Modified independent;Independent  (IND or mod ind depending on home bed set-up)   PT: Transfers Modified independent;Sit to/from stand;Bed to/from chair;Assistive device   PT: Gait Modified independent;Assistive device;100 feet   PT Discharge Planning   PT Plan monitor O2, progress ambulation distance, sit<>stand reps, dual task  gait for balance challenge   PT Discharge Recommendation (DC Rec) Transitional Care Facility   PT Rationale for DC Rec Patient is needing assist for all mobility and fatiguing quickly currently. At this time recommend continued therapies at TCU to progress functional mobility and independence and functional endurance toward baseline as pt would not be able to care for himself at current mobility level & appears per chart review he was mobilizing and completing ADLs at home residential without assist prior to admission   PT Brief overview of current status Ax1 with walker in room, encourage sitting up in chair  for meals, encourage ambulation to bathroom for toileting needs

## 2023-12-17 NOTE — PROGRESS NOTES
[facility]    Progress Note - Medicine Service, MAROON TEAM 4       Date of Admission:  12/15/2023    Assessment & Plan   Jc Cloud is a 89 year old male admitted on 12/15/2023. He has a history of coronary artery disease status post CABG, A-fib and AV block on Eliquis, hypertension, depression and anxiety, sleep apnea, essential tremor, mild cognitive impairment, chronic kidney disease and presented  after being found confused this morning at his assisted living facility and was found to have acute toxic metabolic encephalopathy and acute hypoxic respiratory failure likely due to urinary tract infection with possible superimposed pneumonia.     Changes today:  -Continue ceftriaxone, azithromycin  -VBG, CXR for tachypnea and worsening hypoxia  -trial low dose zyprexa for delirium, note prior adverse reaction listed to seroquel (unknown adverse reaction)    # Acute toxic metabolic encephalopathy  # Urinary tract infection  # Community-acquired pneumonia vs aspiration pneumonia  Patient is known to have mild cognitive impairment at baseline, though his son states that he is usually a good historian and able to take care of most of his ADLs independently.  He does not previously had admissions for confusion.  This is most likely related to his urinary tract infection and possible pneumonia.  He may have had an aspiration event when he was found down earlier this morning.  Unlikely to be stroke with nonfocal neurologic exam.  His electrolytes were normal, and no contributing medications.  He does have a chronic history of alcohol use with 1 drink at night.  Low suspicion that this is contributing.    -Follow urine and blood cultures from 12/15/2023  -Status post Zosyn x 1 on 12/15  -Start ceftriaxone 2 g daily 12/15; continue azithromycin  -Frequent reorientation  -PT/OT consults  -SLP cleared patient for regular diet with supervision  -Hold potential contributing  PTA meds including trazodone and gabapentin     # Acute hypoxic respiratory failure, improving  Possible etiology of his hypoxia on arrival includes community-acquired pneumonia or aspiration pneumonia.  While BNP and troponin are elevated, echocardiogram obtained by ED showed normal systolic function and no evidence of volume overload so I feel heart failure is less likely.  There is no evidence of volume overload on exam.  I was able to wean him off oxygen while in the room with him so I think that he may easily weaned to room air.  -Pneumonia treatment as above  -Supplemental oxygen as needed to maintain sats greater than 88%  -Monitor intake and output, daily weights     # Coronary artery disease status post CABG  # Hypertension  # History of orthostatic hypotension  Possible that patient's orthostatic hypotension contributed to his falls and confusion, especially in the setting of infection.  TTE was obtained with left ventricular ejection fraction of 50% and evidence of pulmonary hypertension.  -Continue PTA metoprolol 12.5 mg twice daily  -Continue midodrine 5 mg oral twice daily  -Consider orthostatic vital signs     # Atrial fibrillation  # Second-degree AV block  # Status post biventricular pacemaker  Cardiac pacemaker was interrogated in the emergency department and was normal.   -Held apixaban on admission, restarted on 12/16     # Essential tremor  -Continue PTA primidone     # Depression/anxiety  -Continue PTA mirtazapine        Diet: Regular Diet Adult    DVT Prophylaxis: as above  Covarrubias Catheter: Not present  Fluids: none  Lines: None     Cardiac Monitoring: None  Code Status: No CPR- Pre-arrest intubation OK    Clinically Significant Risk Factors              # Hypoalbuminemia: Lowest albumin = 3.4 g/dL at 12/15/2023  8:51 AM, will monitor as appropriate       # Hypertension: Noted on problem list             # History of CABG: noted on surgical history       Disposition Plan     Expected Discharge  Date: 12/20/2023        Discharge Comments: once UTI is treated, confusion resolved and therapy has recs for placement        The patient's care was discussed with the  attending physician .    Leena De Anda MD  Medicine Service, [address]  Securely message with Vocera (more info)  Text page via [facility] Paging/Directory   See signed in provider for up to date coverage information  ______________________________________________________________________    Interval History   Overnight patient continues to be confused, pulling at lines and is disoriented.  He is confused during the interview.  When asked, he denies shortness of breath, cough, abdominal pain.    Physical Exam   Vital Signs: Temp: 98.4  F (36.9  C) Temp src: Oral BP: (!) 159/93 Pulse: 82   Resp: 20 SpO2: 90 % O2 Device: Oxymask Oxygen Delivery: 6 LPM  Weight: 165 lbs 8 oz    General Appearance: Elderly male, restless  Eyes: Extraocular movements intact, clear sclera  HEENT: Dry lips, head is atraumatic  Respiratory: Tachypneic work of breathing, decreased breath sounds in the left lung base, no crackles  Cardiovascular: Regular rate and rhythm, no murmurs  GI: Soft, nontender, nondistended throughout  Skin: No obvious rashes or lesions on skin  Musculoskeletal: No lower extremity edema, no gross deformities, no obvious trauma  Neurologic: Oriented to self alone, able to follow one-step commands, no cranial nerve or other focal neurologic deficits    Medical Decision Making       Please see A&P for additional details of medical decision making.      Data     I have personally reviewed the following data over the past 24 hrs:    10.9  \   14.6   / 143 (L)     142 109 (H) 39.2 (H) /  120 (H)   4.9 20 (L) 1.69 (H) \       Imaging results reviewed over the past 24 hrs:   Recent Results (from the past 24 hour(s))   XR Chest Port 1 View    Narrative    Portable chest    INDICATION: Hypoxia  worsening    COMPARISON: 12/15/2023    FINDINGS: Heart size normal. Median sternotomy again present.  Atherosclerotic calcification of the aortic knob. Triple lead  pacemaker from left subclavian transvenous approach again noted. Left  costophrenic angle remains obscured and there is silhouetting left  hemidiaphragm with continued prominent retrocardiac opacification.      Impression    IMPRESSION: Findings consistent with unchanged retrocardiac  atelectasis and possible superimposed left pleural effusion. Recommend  follow-up to clearing to exclude infection. Pacemaker.    TOMMY HANKS MD         SYSTEM ID:  [id number]

## 2023-12-17 NOTE — PROGRESS NOTES
Pt arrived at the unit at 2245 via bed. Attendant at bedside for safety. Pt has been sleeping since arrival to the unit. Report will be given to incoming nurse

## 2023-12-18 PROBLEM — N17.9 ACUTE KIDNEY FAILURE, UNSPECIFIED (H): Status: ACTIVE | Noted: 2023-01-01

## 2023-12-18 NOTE — CONSULTS
Nephrology Initial Consult  December 18, 2023      Jc Cloud MRN:[id number] YOB: 1931  Date of Admission:12/15/2023  Primary care provider: David Muhammad  Requesting physician: Leena De Anda    ASSESSMENT AND RECOMMENDATIONS:   Jc Cloud is a 89 year old male admitted on 12/15/2023. He has a history of coronary artery disease status post CABG, a-fib and AV block on Eliquis, hypertension, depression and anxiety, sleep apnea, essential tremor, mild cognitive impairment, chronic kidney disease and presented after being found confused this morning at his assisted living facility and was found to have acute toxic metabolic encephalopathy and acute hypoxic respiratory failure likely due to urinary tract infection with possible superimposed pneumonia. Initially started on CTX and azithromycin x3 days. Rapid response team was called at around 3 pm on 12/17 due to concern for aspiration event and worsening hypoxia. Lactic acid was elevated at 2.3 and improved with high flow nasal oxygen. Later in the evening of 12/17, patient became more hypotensive with BP's in the 90's/60's and antibiotics were broadened to vancomycin and Zosyn. He also received a small bolus of 500 mLs of NS.      Patient has evidence of worsening renal function today and nephrology consulted for evaluation of GER on CKD. The patient's urine output on 12/16 was 1,100 mLs but urine output has decreased over the last 2 days 100 mLs on 12/17 and 100 mLs so far today. Patient's blood pressures today have improved with most recent being 127/71. It is unclear as to what caused the patient's hypotensive episode yesterday evening. It could have been secondary to sepsis from possible aspiration pneumonia or volume depletion in the setting of poor oral intake. Nevertheless, transient hypoperfusion to the kidneys likely caused ischemic insult leading to his worsening GER.     Recommendations   - MRSA nares returned  negative and primary team stopped vancomycin, avoid nephrotoxic medications where possible   - Patient's intake is improved today, but can consider small fluid boluses to maintain adequate hydration   - Repeat UA assessing for casts that could be suggestive of acute tubular injury/acute tubular necrosis     Recommendations were communicated to primary team in person     Seen and discussed with Dr. Estrella Chapa MD  Nephrology teaching service     REASON FOR CONSULT: GER on CKD    HISTORY OF PRESENT ILLNESS:  Admitting provider and nursing notes reviewed  Jc Cloud is a 89 year old male admitted on 12/15/2023. He has a history of coronary artery disease status post CABG, a-fib and AV block on Eliquis, hypertension, depression and anxiety, sleep apnea, essential tremor, mild cognitive impairment, chronic kidney disease and presented after being found confused this morning at his assisted living facility and was found to have acute toxic metabolic encephalopathy and acute hypoxic respiratory failure likely due to urinary tract infection with possible superimposed pneumonia. Initially started on CTX and azithromycin x3 days. Rapid response team was called at around 3 pm on 12/17 due to concern for aspiration event and worsening hypoxia. Lactic acid was elevated at 2.3 and improved with high flow nasal oxygen. Later in the evening of 12/17, patient became more hypotensive with BP's in the 90's/60's and antibiotics were broadened to vancomycin and Zosyn. He also received a small bolus of 500 mLs of NS.      PAST MEDICAL HISTORY:  Reviewed with patient on 12/18/2023   Past Medical History:   Diagnosis Date    Alcohol abuse     Anxiety     Atrial fibrillation (H)     Closed fracture of multiple ribs of left side with routine healing     Cognitive impairment     Colon, diverticulosis     Coronary artery disease     Falls frequently     Hyperlipidemia     Intestinal adhesions with obstruction (H)      Neuropathy     Orthostatic hypotension     GLORIA (obstructive sleep apnea)     Pacemaker     Primary hypertension     Primary osteoarthritis involving multiple joints     Recurrent major depressive disorder, in remission (H24)     Sleep apnea        Past Surgical History:   Procedure Laterality Date    ADENOIDECTOMY      BYPASS GRAFT ARTERY CORONARY      CATARACT IOL, RT/LT      HERNIA REPAIR      IR THORACENTESIS  4/9/2014    OPEN REDUCTION INTERNAL FIXATION WRIST Right 1/3/2020    Procedure: OPEN REDUCTION INTERNAL FIXATION RIGHT DISTAL RADIUS FRACTURE;  Surgeon: Susan Mcdonald MD;  Location:  OR        MEDICATIONS:  PTA Meds  Prior to Admission medications    Medication Sig Last Dose Taking? Auth Provider Long Term End Date   acetaminophen (ACETAMINOPHEN EXTRA STRENGTH) 500 MG tablet Take 2 tablets (1,000 mg) by mouth 2 times daily. May also take 2 tablets (1,000 mg) daily as needed for mild pain. 12/14/2023 Yes Davdi Muhammad APRN CNP     ELIQUIS ANTICOAGULANT 2.5 MG tablet TAKE 1 TABLET BY MOUTH TWICE DAILY 12/14/2023 Yes David Muhammad APRN CNP     gabapentin (NEURONTIN) 300 MG capsule TAKE 1 CAPSULE BY MOUTH TWICE DAILY 12/14/2023 Yes David Muhammad APRN CNP Yes    metoprolol succinate ER (TOPROL XL) 25 MG 24 hr tablet Take 0.5 tablets (12.5 mg) by mouth 2 times daily 12/14/2023 Yes David Muhammad APRN CNP Yes    midodrine (PROAMATINE) 5 MG tablet TAKE 1 TABLET BY MOUTH TWICE DAILY 12/14/2023 Yes David Muhammad APRN CNP     mirtazapine (REMERON) 15 MG tablet TAKE 1 TABLET BY MOUTH AT BEDTIME 12/14/2023 Yes David Muhammad APRN CNP Yes    polyethylene glycol (MIRALAX) 17 GM/Dose powder MIX 17GM OF POWDER IN 8OZ OF WATER UNTIL COMPLETELY DISSOLVED. DRINK SOLUTION BY MOUTH ONCE DAILY. 12/14/2023 Yes David Muhammad APRN CNP     primidone (MYSOLINE) 50 MG tablet TAKE ONE-HALF TABLET (25 MG) BY MOUTH TWICE DAILY 12/14/2023 Yes David Muhammad  MAGNOLIA Argueta CNP Yes    traZODone (DESYREL) 50 MG tablet TAKE 1 TABLET BY MOUTH AT BEDTIME AS NEEDED 2023 Yes David Muhammad APRN CNP Yes    Vitamin D3 (VITAMIN D) 10 MCG (400 UNIT) tablet TAKE 1 TABLET BY MOUTH ONCE DAILY 2023 Yes David Muhammad APRN CNP        Current Meds   apixaban ANTICOAGULANT  2.5 mg Oral BID    folic acid  1 mg Oral QAM    [Held by provider] gabapentin  300 mg Oral BID    melatonin  5 mg Oral At Bedtime    metoprolol succinate ER  12.5 mg Oral BID    midodrine  5 mg Oral BID    mirtazapine  15 mg Oral At Bedtime    multivitamin w/minerals  1 tablet Oral QAM    piperacillin-tazobactam  3.375 g Intravenous Q6H    primidone  25 mg Oral Daily    sodium chloride (PF)  3 mL Intracatheter Q8H    sodium chloride (PF)  3 mL Intracatheter Q8H    thiamine  100 mg Oral QAM    vancomycin  750 mg Intravenous Q24H     Infusion Meds   - MEDICATION INSTRUCTIONS -         ALLERGIES:    Allergies   Allergen Reactions    Atenolol     Lisinopril     Meperidine     Metoprolol     Quetiapine     Triamterene        REVIEW OF SYSTEMS:  A comprehensive of systems was negative except as noted above.    SOCIAL HISTORY:   Social History     Socioeconomic History    Marital status:      Spouse name: Not on file    Number of children: Not on file    Years of education: Not on file    Highest education level: Not on file   Occupational History    Not on file   Tobacco Use    Smoking status: Former     Packs/day: 2.00     Years: 30.00     Additional pack years: 0.00     Total pack years: 60.00     Types: Cigarettes, Cigars     Start date:      Quit date:      Years since quittin.9    Smokeless tobacco: Never   Substance and Sexual Activity    Alcohol use: Yes     Comment: 2 drinks/day    Drug use: Never    Sexual activity: Not on file   Other Topics Concern    Not on file   Social History Narrative    Not on file     Social Determinants of Health     Financial Resource Strain:  "Not on file   Food Insecurity: Not on file   Transportation Needs: Not on file   Physical Activity: Not on file   Stress: Not on file   Social Connections: Not on file   Interpersonal Safety: Not on file   Housing Stability: Not on file     FAMILY MEDICAL HISTORY:   No family history on file.  Reviewed with patient     PHYSICAL EXAM:   Temp  Av.3  F (36.8  C)  Min: 97.3  F (36.3  C)  Max: 99.3  F (37.4  C)      Pulse  Av.8  Min: 59  Max: 146 Resp  Av.2  Min: 16  Max: 36  FiO2 (%)  Av.5 %  Min: 30 %  Max: 100 %  SpO2  Av.5 %  Min: 86 %  Max: 99 %       /80 (BP Location: Right arm)   Pulse 62   Temp 97.9  F (36.6  C) (Axillary)   Resp 20   Ht 1.854 m (6' 0.99\")   Wt 75.1 kg (165 lb 8 oz)   SpO2 95%   BMI 21.84 kg/m     Date 23 07 - 23 0659   Shift 1295-8563 5858-9311 7170-6988 24 Hour Total   INTAKE   P.O. 240   240   Shift Total(mL/kg) 240(3.2)   240(3.2)   OUTPUT   Urine 50   50   Shift Total(mL/kg) 50(0.67)   50(0.67)   Weight (kg) 75.07 75.07 75.07 75.07      Admit Weight: 83.1 kg (183 lb 1.6 oz)     General: Frail-appearing, resting comfortably   HEENT: PERRLA, EOMi, mucous membranes are moist   Pulm/Resp: Breath sounds mildly decreased at the bases bilaterally R>L, but patient has no increased work of breathing. No wheezing or crackles appreciated   CV: RRR, no murmurs, rubs, gallops   Abdomen: soft, non-distended, non-tender   Ext: Moves all 4 extremities without difficulty, no pitting edema, 2+ distal pulses   Neuro: Alert and answering questions appropriately    Skin: Warm and well perfused. Scattered seborrheic keratoses, but no other rashes or skin changes     LABS:   CMP  Recent Labs   Lab 23  0532 23  1615 23  0605 23  0656 12/15/23  1038 12/15/23  0851    143 142 143  --  144   POTASSIUM 4.9 4.3 4.9 4.8   < > 6.1*   CHLORIDE 111* 109* 109* 110*  --  111*   CO2 20* 20* 20* 20*  --  24   ANIONGAP 14 14 13 13  --  9   GLC 93 " 95 120* 121*  --  119*   BUN 51.3* 44.8* 39.2* 30.0*  --  30.7*   CR 2.40* 1.90* 1.69* 1.51*  --  1.46*   GFRESTIMATED 25* 33* 38* 43*  --  45*   MATT 8.3 8.4 8.3 8.5  --  7.5*   MAG  --   --  2.1  --   --  2.0   PHOS  --   --  3.4  --   --  2.5   PROTTOTAL  --  5.9*  --   --   --  6.0*   ALBUMIN  --  3.3*  --   --   --  3.4*   BILITOTAL  --  0.7  --   --   --  0.6   ALKPHOS  --  85  --   --   --  99   AST  --  35  --   --   --  34   ALT  --  18  --   --   --  20    < > = values in this interval not displayed.     CBC  Recent Labs   Lab 12/18/23  0532 12/17/23  2200 12/17/23  1615 12/17/23  0605   HGB 13.6 13.6 14.7 14.6   WBC 21.5* 20.5* 13.5* 10.9   RBC 3.97* 3.93* 4.21* 4.29*   HCT 43.1 41.4 44.0 44.9   * 105* 105* 105*   MCH 34.3* 34.6* 34.9* 34.0*   MCHC 31.6 32.9 33.4 32.5   RDW 13.4 13.4 13.3 13.4   * 145* 151 143*     INR  Recent Labs   Lab 12/15/23  0851   INR 1.13   PTT 34     ABG  Recent Labs   Lab 12/17/23  2158 12/17/23  2016 12/17/23  1615   O2PER 40 40 15      URINE STUDIES  Recent Labs   Lab Test 12/15/23  1048 02/25/22  1620   COLOR Yellow Light Yellow   APPEARANCE Slightly Cloudy* Clear   URINEGLC 200* Negative   URINEBILI Negative Negative   URINEKETONE Negative Negative   SG 1.021 1.023   UBLD Moderate* Negative   URINEPH 5.5 5.5   PROTEIN 70* 30*   NITRITE Negative Negative   LEUKEST Large* Negative   RBCU 5* <1   WBCU 120* <1     No lab results found.  PTH  No lab results found.  IRON STUDIES  No lab results found.    IMAGING:  CXR Portable 1 View 12/17/23   Impression:   1. New right middle and lower lobe mixed airspace and interstitial  opacities could represent pulmonary edema or aspiration.  2. Unchanged left greater than right pleural effusions.  Per the radiologist's report which is filed     Richi Chapa MD

## 2023-12-18 NOTE — PROGRESS NOTES
"Goal outcome evaluation:  Time: 2100 - 0700  Plan of care reviewed with: Patient  Overall patient progress: No change  VS /83   Pulse 60   Temp 98.7  F (37.1  C) (Oral)   Resp 22   Ht 1.854 m (6' 0.99\")   Wt 75.1 kg (165 lb 8 oz)   SpO2 95%   BMI 21.84 kg/m        Activity: Not OOB overnight.  Neuros: Alert, oriented to self only. Does not call appropriately. Does not follows instructions. Sitter at bedside.   Cardiac: Permanent pacemaker - AV paced. Soft BP. Denies CP.  Respiratory: Lower lobes diminished. Tachypnic, SOB, on HFNC FiO2 50% O2 35L  GI/: Reports no nausea, vomiting, or abdominal pain. Incontinent of bowel and bladder. Bowel sounds are present in all quadrants, no BM this shift. Oliguric, bladder scan 139 mL, primafit in place.  Diet: NPO.   Skin/Incisions: Skin is warm and dry, no new deficits noted.  Lines/Drains: L&R PIV SL  Labs: BC pending.  Pain/Nausea: No c/o of pain or nausea.  New changes this shift: Soft BP on arrival to the unit, RR in the 30's, provider notified, given 500 mL NS bolus with BP improvement. Become very agitated, pulling lines and HFNC, screaming \"let me go\". Given Zyprexa per provider order. Started on Zosyn and Vanco.  Pt woke up extremely agitated a second time requiring several staff to help stop pt from pulling lines and HFNC. Provider came to bedside, ordered a second dose of Zyprexa and mittens.  Plan: Continue POC, notify provider of changes    "

## 2023-12-18 NOTE — PROGRESS NOTES
Transfer  Transferred from:   Via:bed  Reason for transfer: Pt appropriate for 6B- worsened patient condition  Family: Aware of transfer  Belongings: Received with pt  Chart: Received with pt  Medications: Meds received from old unit with pt  Code Status verified on armband: yes  2 RN Skin Assessment Completed By: Writer and Ashley REGULO LINTON  Med rec completed: yes  Suction/Ambu bag/Flowmeter at bedside: yes    Report received from: Jeana RYAN RN

## 2023-12-18 NOTE — PROVIDER NOTIFICATION
Provider Notification: Esme Teosilvia     2117: BP 80s/50s with Maps 60-64, Denies dizziness and chest pain. Also RR 30s.     Orders received: MD to come to bedside.

## 2023-12-18 NOTE — PROVIDER NOTIFICATION
"Time of notification: 0100  Provider notified: Esme Yap   Patient status: agitated, trying to get out of bed, screaming \"let me go\".  Temp:  [98.2  F (36.8  C)-99.3  F (37.4  C)] 98.7  F (37.1  C)  Pulse:  [60-82] 60  Resp:  [20-36] 22  BP: ()/(51-93) 125/83  FiO2 (%):  [35 %-50 %] 35 %  SpO2:  [86 %-96 %] 95 %  Orders received:  Zyprexa   "

## 2023-12-18 NOTE — PHARMACY-VANCOMYCIN DOSING SERVICE
"Pharmacy Vancomycin Initial Note  Date of Service 2023  Patient's  3/25/1931  89 year old, male    Indication: Community Acquired Pneumonia and Sepsis    Current estimated CrCl = Estimated Creatinine Clearance: 26.4 mL/min (A) (based on SCr of 1.9 mg/dL (H)).    Creatinine for last 3 days  12/15/2023:  8:51 AM Creatinine 1.46 mg/dL  2023:  6:56 AM Creatinine 1.51 mg/dL  2023:  6:05 AM Creatinine 1.69 mg/dL;  4:15 PM Creatinine 1.90 mg/dL    Recent Vancomycin Level(s) for last 3 days  No results found for requested labs within last 3 days.      Vancomycin IV Administrations (past 72 hours)        No vancomycin orders with administrations in past 72 hours.                    Nephrotoxins and other renal medications (From now, onward)      Start     Dose/Rate Route Frequency Ordered Stop    23 2230  vancomycin (VANCOCIN) 750 mg in sodium chloride 0.9 % 250 mL intermittent infusion         750 mg  over 90 Minutes Intravenous EVERY 24 HOURS 23 21523 2200  piperacillin-tazobactam (ZOSYN) 3.375 g vial to attach to  mL bag        Note to Pharmacy: For [facility], [facility] and [facility]: For Zosyn-naive patients, use the \"Zosyn initial dose + extended infusion\" order panel.    3.375 g  over 30 Minutes Intravenous EVERY 6 HOURS 23 2140      23 2200  vancomycin (VANCOCIN) 1,500 mg in 0.9% NaCl 250 mL intermittent infusion         1,500 mg  over 90 Minutes Intravenous ONCE 23 215              Contrast Orders - past 72 hours (72h ago, onward)      None            [employer] Prediction of Planned Initial Vancomycin Regimen    Loading dose: 1500 mg at 22:00 2023.  Regimen: 750 mg IV every 24 hours.  Start time: 22:00 on 2023  Exposure target: AUC24 (range)400-600 mg/L.hr   AUC24,ss: 545 mg/L.hr  Probability of AUC24 > 400: 82 %  Ctrough,ss: 18.9 mg/L  Probability of Ctrough,ss > 20: 44 %  Probability of nephrotoxicity (Lodise BRENDAN ): 15 %    Plan:  Start " vancomycin 1500 mg IV once followed by 750 mg q24h.   Vancomycin monitoring method: AUC  Vancomycin therapeutic monitoring goal: 400-600 mg*h/L  Pharmacy will check vancomycin levels as appropriate in 1-3 Days.    Serum creatinine levels will be ordered daily for the first week of therapy and at least twice weekly for subsequent weeks.      Ovi Carbajal RPH

## 2023-12-18 NOTE — PLAN OF CARE
Goal Outcome Evaluation:       Report given to RN Mo on 6B. Alert, oriented to self. Able to follow direction. High flow O2. PIV SL. Pills with apple sauce. Incontinent of B&B. Sitter for safety.

## 2023-12-18 NOTE — PROGRESS NOTES
[facility]    Progress Note - Medicine Service, [facility] TEAM 4       Date of Admission:  12/15/2023    Assessment & Plan   Jc Cloud is a 89 year old male admitted on 12/15/2023. He has a history of coronary artery disease status post CABG, A-fib and AV block on Eliquis, hypertension, depression and anxiety, sleep apnea, essential tremor, mild cognitive impairment, chronic kidney disease and presented  after being found confused this morning at his assisted living facility and was found to have acute toxic metabolic encephalopathy and acute hypoxic respiratory failure likely due to pneumonia (community-acquired versus aspiration) with hospital course complicated by GER on CKD.     Changes today:  -Mentation improving, appetite improving  -Kidney function worsening, likely prerenal event per nephrology  -Renal ultrasound grossly normal with  -Stop vancomycin, continue Zosyn  -Gentle IV fluids 500 cc, encourage p.o. intake    # Acute hypoxic respiratory failure, improving  Possible etiology of his hypoxia on arrival includes community-acquired pneumonia or aspiration pneumonia.  While BNP and troponin are elevated, echocardiogram obtained by ED showed normal systolic function and no evidence of volume overload so I feel heart failure is less likely.  There is no evidence of volume overload on exam.  Evening of 12/17 he became more hypoxic with increased work of breathing.  It is possible he had an aspiration event either at that time or earlier in the day.  On 12/18 he was able to be weaned to low-flow nasal cannula with improvement in his work of breathing.  Still favor his hypoxia is related to community-acquired or aspiration pneumonia.  -Pneumonia treatment as below  -Supplemental oxygen as needed to maintain sats greater than 88%  -Monitor intake and output, daily weights    # Acute toxic metabolic encephalopathy, improving  # C/f Urinary tract infection  #  Community-acquired pneumonia vs aspiration pneumonia  Patient is known to have mild cognitive impairment at baseline, though his son states that he is usually a good historian and able to take care of most of his ADLs independently.  He does not previously had admissions for confusion.  This is most likely related to his urinary tract infection and possible pneumonia.  He may have had an aspiration event when he was found down on morning of presentation.  Unlikely to be stroke with nonfocal neurologic exam.  His electrolytes were normal, and no contributing medications.  He does have a chronic history of alcohol use with 1 drink at night.  Low suspicion that this is contributing.  Urine culture ultimately grew normal urogenital rodger.  -Follow urine and blood cultures from 12/15/2023; no growth to date  -Frequent reorientation  -PT/OT consults  -SLP cleared patient for regular diet with supervision; mildly thick liquids  -Hold potential contributing PTA meds including trazodone and gabapentin  -As needed Zyprexa available for sundowning; 2.5 mg    Antibiotics:  -Zosyn x 1 on 12/15  -ceftriaxone 2 g daily 12/15-12/17  -zosyn 12/17-     # Acute kidney injury on CKD  Baseline creatinine around 1.5.  Close with a past 2 days his kidney function has significantly worsened with a creatinine of 2.4 the morning of 12/17.  Most likely feel like this is prerenal or dehydration related.  -Consult nephrology, appreciate recommendations  -Avoid for toxic medications  -Renal ultrasound 12/18 grossly normal  -Urinalysis ordered  -Trend BMP  - IVF for any poor PO intake    # Coronary artery disease status post CABG  # Hypertension  # History of orthostatic hypotension  Possible that patient's orthostatic hypotension contributed to his falls and confusion, especially in the setting of infection.  TTE was obtained with left ventricular ejection fraction of 50% and evidence of pulmonary hypertension.  -Continue PTA metoprolol 12.5 mg  twice daily  -Continue midodrine 5 mg oral twice daily  -Consider orthostatic vital signs     # Atrial fibrillation  # Second-degree AV block  # Status post biventricular pacemaker  Cardiac pacemaker was interrogated in the emergency department and was normal.   -Held apixaban on admission, restarted on 12/16     # Essential tremor  -Continue PTA primidone     # Depression/anxiety  -Continue PTA mirtazapine        Diet: Regular Diet Adult    DVT Prophylaxis: as above  Covarrubias Catheter: Not present  Fluids: none  Lines: None     Cardiac Monitoring: None  Code Status: No CPR- Pre-arrest intubation OK    Clinically Significant Risk Factors         # Hypernatremia: Highest Na = 146 mmol/L in last 2 days, will monitor as appropriate  # Hypocalcemia: Lowest Ca = 8.3 mg/dL in last 2 days, will monitor and replace as appropriate     # Hypoalbuminemia: Lowest albumin = 3.3 g/dL at 12/17/2023  4:15 PM, will monitor as appropriate      # Acute Kidney Injury, unspecified: based on a >150% or 0.3 mg/dL increase in last creatinine compared to past 90 day average, will monitor renal function  # Hypertension: Noted on problem list            # Financial/Environmental Concerns: none   # History of CABG: noted on surgical history       Disposition Plan         The patient's care was discussed with the  attending physician .    Dar Zaragoza MD  Medicine Service, [address]  Securely message with [employer] info)  Text page via [facility] Paging/Directory   See signed in provider for up to date coverage information  ______________________________________________________________________    Interval History   More awake and alert this morning.  Patient states that he has no fevers, chills, cough, shortness of breath, abdominal pain.  He feels hungry and thirsty.    Physical Exam   Vital Signs: Temp: 97.7  F (36.5  C) Temp src: Oral BP: 111/66 Pulse: 89   Resp: 18 SpO2: 95 % O2  Device: High Flow Nasal Cannula (HFNC) Oxygen Delivery: 40 LPM  Weight: 165 lbs 8 oz    General Appearance: Elderly male, fatigued but nontoxic appearing in bed  Eyes: Extraocular movements intact, clear sclera  HEENT: Dry lips, head is atraumatic  Respiratory: normal work of breathing, decreased breath sounds in the left lung base, no crackles  Cardiovascular: Regular rate and rhythm, no murmurs  GI: Soft, nontender, nondistended throughout  Skin: No obvious rashes or lesions on skin  Musculoskeletal: No lower extremity edema, no gross deformities, no obvious trauma  Neurologic: Oriented to self, year, month, able to follow one-step commands, no cranial nerve or other focal neurologic deficits    Medical Decision Making       Please see A&P for additional details of medical decision making.      Data     I have personally reviewed the following data over the past 24 hrs:    21.5 (H)  \   13.6   / 143 (L)     146 (H) 108 (H) 57.2 (H) /  128 (H)   4.2 23 2.72 (H) \     Procal: N/A CRP: N/A Lactic Acid: 1.6         Imaging results reviewed over the past 24 hrs:   Recent Results (from the past 24 hour(s))   US Renal Complete Non-Vascular    Narrative    EXAMINATION: US RENAL COMPLETE NON-VASCULAR, 12/18/2023 9:48 AM     COMPARISON: None.    HISTORY: Worsening acute kidney injury.    TECHNIQUE: The kidneys and bladder were scanned in the standard  fashion with specialized ultrasound transducer(s) using both gray  scale and limited color/spectral Doppler techniques.    FINDINGS: Limited visualization of bilateral kidneys given acoustic  windows.    Right kidney: Measures 9.2 cm in length. Parenchyma is of echogenicity  with mild thinning. No focal mass. No hydronephrosis.    Left kidney: Measures 10.6 cm in length. Parenchyma is diffusely  thinned with normal echogenicity. No focal mass. No hydronephrosis.     Bladder: Partially distended with dependent echogenic debris.      Impression    IMPRESSION:  1.  Left greater  than right renal atrophy without hydronephrosis  bilaterally.  2.  Echogenic bladder debris. This is nonspecific and can be seen with  infection or hematuria. Correlated with urinary symptoms and  urinalysis.    I have personally reviewed the examination and initial interpretation  and I agree with the findings.    AR RODRÍGUEZ MD         SYSTEM ID:  [id number]

## 2023-12-18 NOTE — PROVIDER NOTIFICATION
12/17/23 1700   Call Information   Date of Call 12/17/23   Time of Call 1559   Name of person requesting the team Jeana Renee   Title of person requesting team RN   RRT Arrival time 1604   Time RRT ended 1706   Reason for call   Type of RRT Adult   Primary reason for call Respiratory   Respiratory Rate greater than 24;O2sat less than 88% for greater than 5 minutes despite O2;Labored breathing   Was patient transferred from the ED, ICU, or PACU within last 24 hours prior to RRT call? Yes   SBAR   Situation Pt desaturating and having increased O2 needs.   Background 89 year old male admitted on 12/15/2023. He has a history of coronary artery disease status post CABG, A-fib and AV block on Eliquis, hypertension, depression and anxiety, sleep apnea, essential tremor, mild cognitive impairment, chronic kidney disease and presented  after being found confused this morning at his assisted living facility and was found to have acute toxic metabolic encephalopathy and acute hypoxic respiratory failure likely due to urinary tract infection with possible superimposed pneumonia.   Notable History/Conditions Cardiac;Hypertension   Assessment Pt's tachypnic. Desaturating to upper 80's. Requiring increasing O2 needs.   Interventions CXR;Labs;Meds;O2 per N/C or mask   Patient Outcome   Patient Outcome Transferred to  (to transfer to 6B when bed opens up.)   RRT Team   Attending/Primary/Covering Physician kassi 4   Date Attending Physician notified 12/17/23   Time Attending Physician notified 1712   Physician(s) Cale CHESTER   Lead RN Lata Thomas   Post RRT Intervention Assessment   Post RRT Assessment Stable/Improved   Date Follow Up Done 12/17/23   Time Follow Up Done 1822   Comments Pt's O2 status improved on high flow NC.

## 2023-12-18 NOTE — PROGRESS NOTES
CLINICAL NUTRITION SERVICES - ASSESSMENT NOTE     Nutrition Prescription    RECOMMENDATIONS FOR MDs/PROVIDERS TO ORDER:  Diet advancement once mentation improves and appropriate to do so. SLP to revisit again this afternoon per bedside RN. Paged team to update diet order per SLP recommendations (previously passed for Regular diet with thin liquids on 12/15).     Malnutrition Status:    Patient does not meet two of the established criteria necessary for diagnosing malnutrition but is at risk for malnutrition    Recommendations already ordered by Registered Dietitian (RD):  Supplements: Offer Ensure Enlive with meals TID (ask flavor preference)    Future/Additional Recommendations:  Monitor diet advancement and PO adequacy.    If EN support becomes nutrition POC, rec:   Novasource Renal (or equivalent) @ 45 ml/hr (1080 ml) provides 2160 kcal (29 kcal/kg), 98 g pro (1.3 g/kg), 198 g CHO, 774 ml free water, and 0 g fiber daily.    - Initiate @ 15 mL/hr and advance by 10 mL q8hr as tolerated to goal rate.   - Do not advance unless K+ >/= 3, Mg++ >/=1.5, and phos >/=1.9  - 30 mL q4hr fluid flushes for tube patency. Additional fluids and/or adjustments per MD.   - Multivitamin/mineral (15 mL/day via FT) to help ensure micronutrient needs being met with suspected hypermetabolic demands and potential interruptions to TF infusions.  - If gastric access: HOB >30 degrees.         REASON FOR ASSESSMENT  Jc Cloud is a/an 89 year old male assessed by the dietitian for Admission Nutrition Risk Screen for positive    NUTRITION HISTORY  Pt not previously known to Clinical Nutrition.    No food allergies noted.     Met with pt at bedside. Pt too lethargic to remain awake to answer questions. Unable to obtain nutrition history at this time.    CURRENT NUTRITION ORDERS  Diet: NPO --> Advanced to Regular with thin liquids recently    LABS  Labs reviewed  -BUN 51.3 (H, uptrended)  -Cr 2.40 (H, uptrended)  -Hyperkalemic on admit -  "now downtrended    MEDICATIONS  Medications reviewed  -Thera-vit-M  -Folvite  -Thiamine    ANTHROPOMETRICS  Height: 185.4 cm (6' .992\")  Most Recent Weight: 75.1 kg (165 lb 8 oz)    IBW: 83.6 kg   BMI: 21.84 kg/m2; Normal BMI  Weight History: No significant recent wt loss noted.   Wt Readings from Last 20 Encounters:   12/17/23 75.1 kg (165 lb 8 oz)   10/31/23 77.3 kg (170 lb 6.4 oz)   07/18/23 78.5 kg (173 lb)   03/30/23 78.5 kg (173 lb)   01/25/23 77.5 kg (170 lb 12.8 oz)   10/20/22 76.4 kg (168 lb 6.4 oz)   09/21/22 72.6 kg (160 lb)   09/14/22 74.8 kg (165 lb)   08/09/22 72.4 kg (159 lb 9.6 oz)   08/11/22 72.4 kg (159 lb 9.6 oz)   04/27/22 63.5 kg (140 lb)   03/16/22 67.6 kg (149 lb)   02/23/22 63.5 kg (140 lb)   01/03/20 70 kg (154 lb 4.8 oz)   Dosing Weight: 75 kg (actual, based on lowest/driest wt this admit of 75.1 kg on 12/17)    ASSESSED NUTRITION NEEDS  Estimated Energy Needs: 7544-1957 kcals/day (25 - 30 kcals/kg)  Justification: Maintenance  Estimated Protein Needs:  grams protein/day (1.2 - 1.5 grams of pro/kg)  Justification: Hypercatabolism with acute illness  Estimated Fluid Needs: 1 mL/kcal   Justification: Maintenance or Per provider pending fluid status    PHYSICAL FINDINGS  See malnutrition section below.  -Skin is dry, thin/fragile    MALNUTRITION  % Intake: Unable to assess - pt too lethargic to answer questions at this time  % Weight Loss: Weight loss does not meet criteria  Subcutaneous Fat Loss: Generalized mild but c/w losses d/t advanced age, so not counting toward malnutrition diagnosis  Muscle Loss: Generalized mild-moderate but c/w sarcopenia of advanced age, so not counting toward malnutrition diagnosis  Fluid Accumulation/Edema: Does not meet criteria (1+ trace per RN flowsheets)  Malnutrition Diagnosis: Patient does not meet two of the established criteria necessary for diagnosing malnutrition but is at risk for malnutrition    NUTRITION DIAGNOSIS  Predicted inadequate nutrient " intake (protein/kcal) related to potential for neuro status to remain unimproved/further decline and potential for inappropriateness for safe PO.    INTERVENTIONS  Implementation  -Nutrition Education: Unable to provide d/t not appropriate at this time d/t pt condition.  -Enteral Nutrition - Recs if needed   -Paged primary team for diet advancement  -Discussed pt with bedside RN    Goals  Diet adv v nutrition support within 2-3 days, pending GOC.     Monitoring/Evaluation  Progress toward goals will be monitored and evaluated per protocol.  Kiara Cloud RD, LD  Pager: [phone]

## 2023-12-18 NOTE — CONSULTS
Care Management Initial Consult    General Information  Assessment completed with: Patient  Type of CM/SW Visit: Initial Assessment  Primary Care Provider verified and updated as needed: Yes   Readmission within the last 30 days: no previous admission in last 30 days   Reason for Consult: discharge planning  Advance Care Planning: Advance Care Planning Reviewed: no concerns identified        Communication Assessment  Patient's communication style: spoken language (English or Bilingual)    Hearing Difficulty or Deaf: yes   Wear Glasses or Blind: yes    Cognitive  Cognitive/Neuro/Behavioral: .WDL except  Level of Consciousness: alert, confused  Arousal Level: opens eyes spontaneously  Orientation: disoriented to, place, time, situation  Mood/Behavior: calm, cooperative  Best Language: 0 - No aphasia  Speech: clear, spontaneous, logical    Living Environment:   People in home: facility resident     Current living Arrangements: assisted living    Name of Facility: [facility]   Able to return to prior arrangements: yes     Family/Social Support:  Care provided by:    Provides care for: no one, unable/limited ability to care for self  Marital Status:   Support System: Children          Description of Support System: Supportive, Involved       Current Resources:   Patient receiving home care services: No  Community Resources: Assisted Living  Equipment currently used at home: none  Supplies currently used at home: None    Employment/Financial:  Employment Status: retired     Financial Concerns: none   Referral to Financial Worker: No     Does the patient's insurance plan have a 3 day qualifying hospital stay waiver?  No    Lifestyle & Psychosocial Needs:  Social Determinants of Health     Food Insecurity: Not on file   Depression: Not on file   Housing Stability: Not on file   Tobacco Use: Medium Risk (11/10/2023)    Patient History     Smoking Tobacco Use: Former     Smokeless Tobacco Use: Never  "    Passive Exposure: Not on file   Financial Resource Strain: Not on file   Alcohol Use: Not on file   Transportation Needs: Not on file   Physical Activity: Not on file   Interpersonal Safety: Not on file   Stress: Not on file   Social Connections: Not on file     Functional Status:  Prior to admission patient needed assistance:   Dependent ADLs: Bathing (Furniture walks, refuses to use a walker or cane)  Dependent IADLs: Cleaning, Cooking, Laundry, Shopping, Meal Preparation, Medication Management, Transportation, Money Management     Mental Health Status:  Mental Health Status: Current Concern    Mental Health Management: Medication    Chemical Dependency Status:  Chemical Dependency Status: No Current Concerns               Additional Information:  Care management assessment completed via phone w/patient's son and primary HCA, Kan. Kan confirms that the patient [facility] at [facility]. Patient receives assistance w/housekeeping, showers, meals and medication management. Patient \"furniture walks,\" and refuses to use a walker or a cane, previously telling his son that once he gives up on his independence, it is never coming back. Patient stays in his apartment most of the time, does not like to be around other people. Patient's son notes that he is likely depressed, and has been for some time, but seems to be doing much better since moving to the [facility] two years ago. Patient's son voiced concern regarding patient's quality of life and honoring his wishes. Per patient's son, Kan, patient has another son, Jc, in [place] whom is on standby to get to [place] if needed, as they would like to be together as a family if things take a turn for the worse. Per discussion with the primary team this morning, no plan for goals of care conversation at this time, as patient is on IV abx and his O2 needs have decreased significantly (now on 3-4L).     Per chart review, therapy is recommending TCU. " Pt's son noted that they would likely be agreeable to TCU if patient's long term is unable to accommodate his needs. Pt's son hopeful that he will continue to improve and be able to return to his [facility].     Writer contacted the [facility], left a VM w/the nursing office requesting call back to discuss patient.     Care management will continue to follow.     Discharge resources:  [facility]  Phone: [phone]    Jami De La Rosa RNCC, BSN    [facility]    [address] [place] [address]    [email or web address]    Office: [phone] Pager: [phone]

## 2023-12-19 NOTE — PROVIDER NOTIFICATION
Time of notification: 12:25 AM  Provider notified: MAGNUS OQUENDO   Patient status: Agitated  Can you please come to bedside?  Orders received:

## 2023-12-19 NOTE — PLAN OF CARE
Neuro: A&Ox1 (self only). Pleasant. Able to make needs known. Neuros unchanged.  Cardiac: No tele order. Pacemaker in place (AV-paced). HR's 50-60's. Hypotensive w/BP 's/50-60's. MAPs >65. Afebrile.       Respiratory: Sating >92% 2L NC at rest, up to 4L NC w/activity or when sleeping (was 40% FiO2 + 55 LPM HFNC this AM). GTZ. LS clear/diminished. Needs sputum sample.  GI/: Minimal UOP via Primofit. MD's aware. Encouraged BR/urinal use. Cont., loose/watery BM x2.  Diet/appetite: Tolerating regular diet (was NPO) w/mildy thick liquids + snacks/supps. Good appetite. Encouraged PO intake. Denied N/V. SLP following.  Activity: Assist of 1 + gait belt + walker. Up to chair x2. Ambulated to/from BR x2. Ambulated in hallway x1. Worked w/PT/OT.  Pain: Denied.   Skin: No new deficits noted.  LDA's: R PIV infusing D5 at 100mL/hr; 2 L PIV's - SL/TKO.      Plan: Will continue with POC and notify primary team with any changes.

## 2023-12-19 NOTE — PROGRESS NOTES
"Goal outcome evaluation:  Time: 1900 - 0700  Plan of care reviewed with: Patient  Overall patient progress: No change  VS /69   Pulse 60   Temp 97.8  F (36.6  C) (Oral)   Resp 18   Ht 1.854 m (6' 0.99\")   Wt 75.1 kg (165 lb 8 oz)   SpO2 99%   BMI 21.84 kg/m        Jc Cloud is a 89 year old male admitted on 12/15/2023. He has a history of coronary artery disease status post CABG, A-fib and AV block on Eliquis, hypertension, depression and anxiety, sleep apnea, essential tremor, mild cognitive impairment, chronic kidney disease and presented  after being found confused this morning at his assisted living facility and was found to have acute toxic metabolic encephalopathy and acute hypoxic respiratory failure likely due to pneumonia (community-acquired versus aspiration) with hospital course complicated by GER on CKD.     Activity: Not OOB overnight.  Neuros:Mostly somnolent, oriented to self only. Does not call appropriately. Does not follows instructions.   Cardiac: Permanent pacemaker - AV paced. Soft BP. Denies CP.  Respiratory: Lower lobes diminished. Tachypnic, SOB, on HFNC FiO2 50%, O2 40L  GI/: Reports no nausea, vomiting, or abdominal pain. Incontinent of bowel and bladder. Bowel sounds are present in all quadrants, loose brown BM x 2. Oliguric, team aware, primafit in place.  Diet: Regular Diet Adult Mildly Thick   Skin/Incisions: Skin is warm and dry, no new deficits noted.  Lines/Drains: L&R PIV SL  Labs: MRSA negative. Vanco d/c'ed. BC - no growth. PC02 62, up from 51.  Pain/Nausea: No c/o of pain or nausea.  New changes this shift: Pt somnolent, responds to trapeze squeeze, but falls asleep right away. Soft BP (80's/50's - 70's/50's). RRT called. 2L of LR given with some improvement in BP. . EKG abnormal. BP drop again (SPB 80's). Parameters changed to MAP goal >65. Flutter valve 4 times daily when awake. Pt woke up agitated, pulled out HFNC, BP improved with episode of " agitation. Pt back to sleep after writer re-assured he was safe in a hospital.   Plan: Continue POC, notify provider of changes

## 2023-12-19 NOTE — PROGRESS NOTES
RRT called on patient. Came to room, patient already on HFNC 40L/30%, sating well. No respiratory concerns at present.

## 2023-12-19 NOTE — PROGRESS NOTES
[facility]    Progress Note - Medicine Service, [facility] TEAM 4       Date of Admission:  12/15/2023    Assessment & Plan   Jc Cloud is a 89 year old male admitted on 12/15/2023. He has a history of coronary artery disease status post CABG, A-fib and AV block on Eliquis, hypertension, depression and anxiety, sleep apnea, essential tremor, mild cognitive impairment, chronic kidney disease and presented  after being found confused this morning at his assisted living facility and was found to have acute toxic metabolic encephalopathy and acute hypoxic respiratory failure likely due to pneumonia (community-acquired versus aspiration) with hospital course complicated by GER on CKD and hypernatremia.     Changes today:  -Mentation improved, walking the halls today  -Hypernatremia, started dextrose containing fluids for correction  -Continue Zosyn    # Acute hypoxic respiratory failure, improving  Possible etiology of his hypoxia on arrival includes community-acquired pneumonia or aspiration pneumonia.  While BNP and troponin are elevated, echocardiogram obtained by ED showed normal systolic function and no evidence of volume overload so I feel heart failure is less likely.  There is no evidence of volume overload on exam.  Evening of 12/17 he became more hypoxic with increased work of breathing.  It is possible he had an aspiration event either at that time or earlier in the day.  On 12/18 he was able to be weaned to low-flow nasal cannula with improvement in his work of breathing.  Still favor his hypoxia is related to community-acquired or aspiration pneumonia.  -Pneumonia treatment as below  -Supplemental oxygen as needed to maintain sats greater than 88%  -Monitor intake and output, daily weights    # Acute toxic metabolic encephalopathy, improving  # C/f Urinary tract infection, resolved  # Community-acquired pneumonia vs aspiration pneumonia  Patient is known to  have mild cognitive impairment at baseline, though his son states that he is usually a good historian and able to take care of most of his ADLs independently.  He does not previously had admissions for confusion.  This is most likely related to his urinary tract infection and possible pneumonia.  He may have had an aspiration event when he was found down on morning of presentation.  Unlikely to be stroke with nonfocal neurologic exam.  His electrolytes were normal, and no contributing medications.  He does have a chronic history of alcohol use with 1 drink at night.  Low suspicion that this is contributing.  Urine culture ultimately grew normal urogenital rodger.  -Follow urine and blood cultures from 12/15/2023; no growth to date  -Frequent reorientation  -PT/OT consults  -SLP cleared patient for regular diet with supervision; mildly thick liquids  -Hold potential contributing PTA meds including trazodone and gabapentin  -As needed Zyprexa available for sundowning; 2.5 mg    Antibiotics:  -Zosyn x 1 on 12/15  -ceftriaxone 2 g daily 12/15-12/17  -zosyn 12/17-     # Hypernatremia  Has free water deficit of at least 1 L.  Nephrology consulted.  -D5 water 100 mL an hour for 24 hours  -A.m. BMP    # Acute kidney injury on CKD, stable  Baseline creatinine around 1.5.  Close with a past 2 days his kidney function has significantly worsened with a creatinine of 2.4 the morning of 12/17.  Most likely feel like this is prerenal/hypovolemia related.  -Consult nephrology, appreciate recommendations  -Avoid toxic medications  -Renal ultrasound 12/18 grossly normal  -Urinalysis ordered  -Trend BMP  - IVF for any poor PO intake    # Coronary artery disease status post CABG  # Hypertension  # History of orthostatic hypotension  Possible that patient's orthostatic hypotension contributed to his falls and confusion, especially in the setting of infection.  TTE was obtained with left ventricular ejection fraction of 50% and evidence  of pulmonary hypertension.  -Continue PTA metoprolol 12.5 mg twice daily  -Continue midodrine 5 mg oral twice daily  -Consider orthostatic vital signs     # Atrial fibrillation  # Second-degree AV block  # Status post biventricular pacemaker  Cardiac pacemaker was interrogated in the emergency department and was normal.   -Held apixaban on admission, restarted on 12/16     # Essential tremor  -Continue PTA primidone     # Depression/anxiety  -Continue PTA mirtazapine        Diet: Regular Diet Adult    DVT Prophylaxis: as above  Covarrubias Catheter: Not present  Fluids: none  Lines: None     Cardiac Monitoring: None  Code Status: No CPR- Pre-arrest intubation OK    Clinically Significant Risk Factors         # Hypernatremia: Highest Na = 149 mmol/L in last 2 days, will monitor as appropriate      # Hypoalbuminemia: Lowest albumin = 3.3 g/dL at 12/17/2023  4:15 PM, will monitor as appropriate      # Acute Kidney Injury, unspecified: based on a >150% or 0.3 mg/dL increase in last creatinine compared to past 90 day average, will monitor renal function  # Hypertension: Noted on problem list            # Financial/Environmental Concerns: none   # History of CABG: noted on surgical history       Disposition Plan         The patient's care was discussed with the  attending physician .    Dar Zaragoza MD  Medicine Service, [facility] TEAM [address] [place] [address]  Securely message with [employer] (more info)  Text page via [employer] Paging/Directory   See signed in provider for up to date coverage information  ______________________________________________________________________    Interval History   More awake and alert this morning.  He has no cough, no difficulty breathing, no pain.  He is feeling hungry and thirsty.    Physical Exam   Vital Signs: Temp: 98.1  F (36.7  C) Temp src: Oral BP: 92/65 Pulse: 62   Resp: 20 SpO2: 95 % O2 Device: Nasal cannula Oxygen Delivery: 2 LPM  Weight: 175 lbs 14.83  oz    General Appearance: Elderly male, no acute distress  Eyes: Extraocular movements intact, clear sclera  HEENT: Dry lips, head is atraumatic  Respiratory: normal work of breathing, decreased breath sounds in the left lung base, no crackles, on high flow nasal cannula when I examined him  Cardiovascular: Regular rate and rhythm, no murmurs  GI: Soft, nontender, nondistended throughout  Skin: No obvious rashes or lesions on skin  Musculoskeletal: No lower extremity edema, no gross deformities, no obvious trauma  Neurologic: Oriented to self, year, month, able to follow one-step commands, no cranial nerve or other focal neurologic deficits    Medical Decision Making       Please see A&P for additional details of medical decision making.      Data     I have personally reviewed the following data over the past 24 hrs:    14.0 (H)  \   13.8   / 131 (L)     149 (H) 113 (H) 59.7 (H) /  93   5.1 23 2.98 (H) \     Procal: N/A CRP: N/A Lactic Acid: 1.8         Imaging results reviewed over the past 24 hrs:   Recent Results (from the past 24 hour(s))   XR Chest Port 1 View    Narrative    EXAM: XR CHEST PORT 1 VIEW  12/18/2023 11:02 PM     HISTORY: More and desaturation and aspiration       COMPARISON: Radiographs 12/17/2023    TECHNIQUE: Portable frontal radiograph of the chest.    FINDINGS: Stable cardiac pacer with intact leads in stable position.  Median sternotomy wires, the inferior most wire is fractured similar  to prior. Stable cardiomegaly. Increased bibasilar and retrocardiac  opacity. Increased interstitial opacification. Likely small bilateral  pleural effusions.      Impression    IMPRESSION: Increased bibasilar retrocardiac opacities likely  representing atelectasis, infection, or aspiration. Mild interstitial  opacities likely representing pulmonary edema or infection. Stable  cardiomegaly.    I have personally reviewed the examination and initial interpretation  and I agree with the findings.    ROBERTO  DO ROLO         SYSTEM ID:  [id number]

## 2023-12-19 NOTE — PROGRESS NOTES
Care Management Follow Up    Length of Stay (days): 4    Expected Discharge Date:       Concerns to be Addressed: discharge planning      Patient plan of care discussed at interdisciplinary rounds: Yes    Anticipated Discharge Disposition: Transitional Care Unit      Anticipated Discharge Services:  Transitional Care Unit  Anticipated Discharge DME:  N/A    Patient/family educated on Medicare website which has current facility and service quality ratings:  Yes  Education Provided on the Discharge Plan: Yes  Patient/Family in Agreement with the Plan:  Yes    Referrals Placed by CM/SW:  N/A  Private pay costs discussed: Not applicable    Additional Information:  SW met with pt at bedside. Introduced self and role. SW came to talk to pt about PT/OT TCU recs. SW provided medicare.gov list and provided information about discharge to TCU. Pt was confused with idea and wasn't aware that he came from [facility]. SW asked pt if SW can speak further about TCU placement with pt's son Kan. Pt was agreeable for SW to reach out to Kan about TCU placement. SW called Kan about pt's PT/OT TCU recs. Kan was agreeable to the idea. Kan would like for pt to go back to [facility]. Care management will look into if they can offer TCU services. Care management will follow for updates.     THEODORE Vital, LGSW  6B   Ph: [phone]  Pager: [phone]

## 2023-12-19 NOTE — PLAN OF CARE
Neuro: A&Ox1 (self only). Pleasant. Able to redirect. Mitts removed, sitter discontinued. Neuros intact.  Cardiac: No tele. Pacemaker in place. HR's 60-70's. BP's 120-130's/70-80's. Afebrile.   Respiratory: Sating >92% 2-3L NC (was 50% FiO2 + 40 LPM HFNC). GTZ. MRSA swab sent. Needs sputum sample.  GI/: Anuric, Primofit in place. MD's aware. Needs urine sample. Renal US done. Nephrology consulted for GER. Watery, loose BM x4.  Diet/appetite: Advanced to regular diet (was NPO) w/mildy thick liquids. Poor appetite. Encouraged PO intake. Denied N/V. SLP following.  Activity: Assist of 2, repositioned q2hrs + PRN. PT/OT consulted.  Pain: Denied.   Skin: No new deficits noted.  LDA's: R PIV - SL; L PIV - SL.    Plan: Will continue with POC and notify primary team with any changes

## 2023-12-19 NOTE — PROGRESS NOTES
Care Management Follow Up    Length of Stay (days): 4    Expected Discharge Date: TBD     Concerns to be Addressed: Discharge planning, medical readiness.   Patient plan of care discussed at interdisciplinary rounds: Yes    Anticipated Discharge Disposition: TCU?  Anticipated Discharge Services: TBD  Anticipated Discharge DME: TBD    Patient/family educated on Medicare website which has current facility and service quality ratings:  NA  Education Provided on the Discharge Plan:  NA  Patient/Family in Agreement with the Plan:  NA    Referrals Placed by CM/SW:  No referrals at this time.   Private pay costs discussed: Not applicable    Additional Information:  Voicemail received from nurse Ana Rosa at the [facility]. Ana Rosa confirms that they provided BID medication management for the patient, housekeeping, shower assistance, meal delivery, and safety checks. Patient was otherwise independent within his apartment. Per chart review, patient currently needing assist x1 for ambulation and is below his baseline, SW following for possible TCU placement.     Resources:  [facility]  Phone: [phone]  Nurse line: [phone]    Care coordination will continue to follow.     Jami De La Rosa, RNCC, BSN    [facility]    [address]  [address] [place] [address]    [email or web address]  [email or web address]    Office: [phone] Pager: [phone]

## 2023-12-19 NOTE — CODE/RAPID RESPONSE
12/18/23 2100   Call Information   Date of Call 12/18/23   Time of Call 2129   Name of person requesting the team Tamiko   Title of person requesting team RN   RRT Arrival time 2135   Reason for call   Type of RRT Adult   Primary reason for call Cardiovascular   Cardiovascular SBP less than 90   Was patient transferred from the ED, ICU, or PACU within last 24 hours prior to RRT call? No   SBAR   Situation MAP 55-60s and not waking up   Background 89 year old male admitted on 12/15/2023. He has a history of coronary artery disease status post CABG, A-fib and AV block on Eliquis, hypertension, depression and anxiety, sleep apnea, essential tremor, mild cognitive impairment, chronic kidney disease and presented after being found confused this morning at his assisted living facility and was found to have acute toxic metabolic encephalopathy and acute hypoxic respiratory failure likely due to urinary tract infection with possible superimposed pneumonia.   Notable History/Conditions Cardiac;Hypertension   Assessment Obtunded, arouses to deep trapeze squeeze, hypotensive, on HFNC,   Interventions Blood glucose;Fluid bolus;ECG;Labs   RRT Team   Attending/Primary/Covering Physician Carmen 4   Date Attending Physician notified 12/18/23   Time Attending Physician notified 2129   Physician(s) Awilda Kirk   Lead RN Yoana Morrison   RT Mirna   Post RRT Intervention Assessment   Post RRT Assessment Stable/Improved   Date Follow Up Done 12/18/23   Time Follow Up Done 2340   Comments Pressures improving after fluid resucitation. JVD recognized, Awilda Kirk PA-C to bedside. D/t cardiac hx, will hold off on additional bolus. Remains somnolent, reactive to sternal rub.

## 2023-12-19 NOTE — PROGRESS NOTES
Nephrology Progress Note  12/19/2023         Assessment & Recommendations:   Jc Cloud is a 89 year old male admitted on 12/15/2023. He has a history of coronary artery disease status post CABG, a-fib and AV block on Eliquis, hypertension, depression and anxiety, sleep apnea, essential tremor, mild cognitive impairment, currently admitted for treatment of aspiration pneumonia and developed worsening GER in the setting of recurrent episodes of hypotension secondary of sepsis vs dehydration     Patient's blood pressures yesterday evening once again dropped, this time as low as 71/57 at around 10 pm. Patient was markedly altered at that time and had increased O2 requirements from earlier in the day. Labs at that time demonstrated worsening renal function with creatinine at nearly 3, up from 2.4 earlier the same day. VBG demonstrated respiratory acidosis without metabolic compensation.      At this time, the etiology of the patient's GER is likely secondary to transient ischemia/hypoperfusion in the setting of recurrent episodes of hypotension. Renal ultrasound on 12/18/23 demonstrated bilateral renal atrophy L>R without hydronephrosis bilaterally as well as bladder debris that would be consistent with the patient's UTI. Repeat UA did not show any casts or findings that would be suggestive of acute tubular necrosis. The patient was also markedly more hypernatremic on morning labs today, concerning for a negative fluid balance. While his urine output has been decreased, it is difficult to track as he has a external urinary catheter and has had several episodes of incontinence.      Recommendations   - Agree with holding metoprolol in the setting of inadequate cardiac response to hypotension  - Avoid nephrotoxic medications where possible  - Correction of hypernatremia with D5W at 100 ml's/hr for 24 hours, monitor with AM labs      Recommendations were communicated to primary team in person and via note     Seen  "and discussed with Dr. Estrella Chapa MD   Nephrology Teaching Service     Interval History :   Nursing and provider notes from last 24 hours reviewed.  In the last 24 hours Jc Cloud developed another episode of hypotension with blood pressures in the 70's/50's and heart rate was in the 60's. The patient was noted to be lethargic on bedside evaluation. He was bolused with 2 L of LR with improvement in MAPs as well as mentation, though the patient was intermittently agitated and confused throughout the night. The patient was pleasant this morning and did not have any recollection of last night's events. He is orientated to self only.     Physical Exam:   I/O last 3 completed shifts:  In: 3440 [P.O.:1440; IV Piggyback:2000]  Out: 75 [Urine:75]   /75 (BP Location: Left arm)   Pulse 63   Temp 97.9  F (36.6  C) (Axillary)   Resp 20   Ht 1.854 m (6' 0.99\")   Wt 79.8 kg (175 lb 14.8 oz)   SpO2 100%   BMI 23.22 kg/m       GENERAL APPEARANCE:   General: Frail-appearing, resting comfortably   HEENT: PERRLA, EOMi, mucous membranes are moist   Pulm/Resp: Some crackles noted at the right lung base, but no increased work of breathing. No wheezing appreciated.  CV: RRR, no murmurs, rubs, gallops   Abdomen: soft, non-distended, non-tender   Ext: Moves all 4 extremities without difficulty, no pitting edema, 2+ distal pulses   Neuro: Alert and answering questions appropriately. Orientated to person, but not place or time.   Skin: Warm and well perfused. Scattered seborrheic keratoses, but no other rashes or skin changes     Labs:   All labs reviewed by me  Electrolytes/Renal -   Recent Labs   Lab Test 12/19/23  0539 12/18/23  2200 12/18/23  2139 12/18/23  1504 12/17/23  1615 12/17/23  0605 12/15/23  1038 12/15/23  0851   * 146*  --  146*   < > 142   < > 144   POTASSIUM 5.1 3.9  --  4.2   < > 4.9   < > 6.1*   CHLORIDE 113* 112*  --  108*   < > 109*   < > 111*   CO2 23 20*  --  23   < > 20*   < > " 24   BUN 59.7* 60.6*  --  57.2*   < > 39.2*   < > 30.7*   CR 2.98* 2.98*  --  2.72*   < > 1.69*   < > 1.46*   GLC 93 126* 121* 128*   < > 120*   < > 119*   MATT 8.1* 7.8*  --  8.6   < > 8.3   < > 7.5*   MAG  --  2.3  --   --   --  2.1  --  2.0   PHOS  --   --   --   --   --  3.4  --  2.5    < > = values in this interval not displayed.       CBC -   Recent Labs   Lab Test 12/19/23  0539 12/18/23  2200 12/18/23  0532   WBC 14.0* 12.1* 21.5*   HGB 13.8 12.9* 13.6   * 130* 143*       LFTs -   Recent Labs   Lab Test 12/17/23  1615 12/15/23  0851   ALKPHOS 85 99   BILITOTAL 0.7 0.6   ALT 18 20   AST 35 34   PROTTOTAL 5.9* 6.0*   ALBUMIN 3.3* 3.4*       Imaging:  US Renal Complete (12/18/23)  IMPRESSION:  1.  Left greater than right renal atrophy without hydronephrosis bilaterally.  2.  Echogenic bladder debris. This is nonspecific and can be seen with  infection or hematuria. Correlated with urinary symptoms and  urinalysis.    Per the radiologist's report which is filed     Current Medications:   albuterol  2.5 mg Nebulization Q4H    apixaban ANTICOAGULANT  2.5 mg Oral BID    folic acid  1 mg Oral QAM    [Held by provider] gabapentin  300 mg Oral BID    melatonin  5 mg Oral At Bedtime    [Held by provider] metoprolol succinate ER  12.5 mg Oral BID    midodrine  5 mg Oral BID    mirtazapine  15 mg Oral At Bedtime    multivitamin w/minerals  1 tablet Oral QAM    piperacillin-tazobactam  3.375 g Intravenous Q6H    primidone  25 mg Oral Daily    sodium chloride (PF)  3 mL Intracatheter Q8H    sodium chloride (PF)  3 mL Intracatheter Q8H    thiamine  100 mg Oral QAM      - MEDICATION INSTRUCTIONS -       Richi Chapa MD

## 2023-12-19 NOTE — PROGRESS NOTES
Brief Crosscover Note    Paged around ~9PM about hypotension and altered mental status. Assessed patient at bedside. BP 70s/50s, MAPs of 59, 63. On exam he was lethargic and arousable to painful stimuli, coarse breath sounds noted. On 40LPM 40% FiO2 HFNC which he had been on intermittently earlier in the day, satting well. Decreased UOP with ~150cc on prior bladder scan.     Being treated for PNA and currently on Zosyn with negative MRSA nares. Labs showed stable Hgb, pCO2 51 (prior was 38), pH 7.27 (prior was 7.39), unremarkable lactic acid.     Suspected to be hypovolemic causing hypotension, started LR fluid bolus, received 2L fluids, pressure bagged, began to have improvement in BP. With improvement in BP he became less lethargic and more easily arousable.     He has history of aspiration. With productive cough requiring suctioning. Concern he may have mucus plugged contributing to his new respiratory acidosis. CXR obtained showing signs of more bibasilar opacities c/f aspiration. Given his aspiration risk, unable to safely use BiPAP. VBG monitored throughout the night, settled with pCO2 62, pH 7.23. Patient more alert and agitated during the night, no more lethargy or unarousableness. Started on flutter valve and albuterol nebulizer to help clear airways    Vel Omer updated overnight    PLAN  - S/p 2L LR with improvement in BP, maintained adequate BP throughout night  - Cont Zosyn  - Trend VBG, unable to use BiPAP given aspiration risk  - Flutter valve, albuterol nebs, pulmonary toilet  - Can consider increasing midodrine dosing  - Consider holding metoprolol given borderline bradycardia and hypotension      Arben Obrien, PGY2

## 2023-12-19 NOTE — CODE/RAPID RESPONSE
Rapid Response Team Note    Assessment   In assessment a rapid response was called on Jc Cloud due to acute encephalopathy and hypotension. This presentation is uncertain, concerns for worsening sepsis 2/2 aspiration pneumonia     Plan   -  s/p 2Ls LR bolus pressure bagged with improvement in MAPs, however concerns patient will not maintain blood pressures. Low threshold to start vasopressors  -VBG with worsening acidosis  -CBC, BMP, lactic acid, pending   -continue Zosyn   -  The Internal Medicine primary team was  at bedside and plan was formulated with them.  -  Disposition: The patient will remain on the current unit. We will continue to monitor this patient closely.  -  Reassessment and plan follow-up will be performed by the primary team    Jc is critically ill with hypovolemic shock. These features are alarming and indicate that the patient is at imminent risk of life-threatening organ failure. Acute deterioration is being managed by the following critical interventions: IV fluids    Total Critical Care time spent by me, excluding procedures, was 30-74 minutes = [id number]   Awilda Kirk PA-C  [facility] RRT [facility] Job Code Contact #[phone]  [facility] Paging/Directory    Hospital Course   Brief Summary of events leading to rapid response:   Rapid response called for AMS and hypotension.     Admission Diagnosis:   Tremor [R25.1]  Hypoxia [R09.02]  Acute lower UTI [N39.0]  Anticoagulated [Z79.01]  Fall, initial encounter [W19.XXXA]  History of dementia [Z86.59]  Altered mental status, unspecified altered mental status type [R41.82]  Aspiration pneumonia of right lower lobe, unspecified aspiration pneumonia type (H) [J69.0]    Physical Exam   Temp: 97.8  F (36.6  C) Temp  Min: 97.7  F (36.5  C)  Max: 98.3  F (36.8  C)  Resp: 18 Resp  Min: 18  Max: 33  SpO2: 97 % SpO2  Min: 88 %  Max: 98 %  Pulse: 94 Pulse  Min: 60  Max: 94    No data recorded  BP: 90/55 Systolic (24hrs), Av , Min:90 , Max:135  "  Diastolic (24hrs), Av, Min:52, Max:83     I/Os: I/O last 3 completed shifts:  In: 720 [P.O.:720]  Out: 50 [Urine:50]     Exam:   GENERAL:somnolent, arouses to noxious stimuli only.    HEENT: Anicteric sclera. Mucous membranes dry   CV: RRR. S1, S2. No murmurs appreciated.   RESPIRATORY: Effort normal on HFNC. Lungs CTAB with no wheezing, rales.  Scattered rhonchi.    GI: Abdomen soft and non distended with normoactive bowel sounds present in all quadrants. No tenderness  NEUROLOGICAL: no focal deficits. Unable to perform full neuro exam   EXTREMITIES: No peripheral edema.         Significant Results and Procedures   Lactic Acid:   Recent Labs   Lab Test 23  1615 12/15/23  0856   LACT 1.6 2.3* 1.7     CBC:   Recent Labs   Lab Test 23  0532 23  2200 23  1615   WBC 21.5* 20.5* 13.5*   HGB 13.6 13.6 14.7   HCT 43.1 41.4 44.0   * 145* 151        Sepsis Evaluation   The patient is known to have an infection.  Jc Cloud meets SIRS criteria AND has a lactate >=4 or hypotension despite volume resuscitation with 30 ml/kg crystalloid.  These vital signs, lab and physical exam findings constitute a diagnosis of SEPTIC SHOCK, based on: Evidence of hypotension despite volume resuscitation with 30 ml/kg crystalloid          Anti-infectives (From now, onward)      Start     Dose/Rate Route Frequency Ordered Stop    23 2200  piperacillin-tazobactam (ZOSYN) 3.375 g vial to attach to  mL bag        Note to Pharmacy: For [facility], [facility] and [facility]: For Zosyn-naive patients, use the \"Zosyn initial dose + extended infusion\" order panel.    3.375 g  over 30 Minutes Intravenous EVERY 6 HOURS 23 2140            Current antibiotic coverage is appropriate for source of infection.    3 Hour Severe Sepsis Bundle Completion:  1. Initial Lactic Acid result shown above. Repeat lactic acid is not indicated.   2. Blood Cultures before Antibiotics: No, antibiotics were started prior to " BCx collection b/c waiting for BCx to be collected would have been detrimental to the patient  3. Broad Spectrum Antibiotics Administered: yes  4. Fluids: Full 30 mL/kg bolus given (see amount below).    BMI Readings from Last 1 Encounters:   12/17/23 21.84 kg/m      30 mL/kg fluids based on weight: 2,250 mL  30 mL/kg fluids based on IBW (must be >= 60 inches tall): 2,400 mL

## 2023-12-20 ENCOUNTER — APPOINTMENT (RX ONLY)
Dept: URBAN - METROPOLITAN AREA CLINIC 146 | Facility: CLINIC | Age: 88
Setting detail: DERMATOLOGY
End: 2023-12-20

## 2023-12-20 DIAGNOSIS — D18.0 HEMANGIOMA: ICD-10-CM

## 2023-12-20 DIAGNOSIS — L82.1 OTHER SEBORRHEIC KERATOSIS: ICD-10-CM

## 2023-12-20 DIAGNOSIS — D485 NEOPLASM OF UNCERTAIN BEHAVIOR OF SKIN: ICD-10-CM

## 2023-12-20 DIAGNOSIS — L57.0 ACTINIC KERATOSIS: ICD-10-CM

## 2023-12-20 DIAGNOSIS — L81.4 OTHER MELANIN HYPERPIGMENTATION: ICD-10-CM

## 2023-12-20 DIAGNOSIS — Z12.83 ENCOUNTER FOR SCREENING FOR MALIGNANT NEOPLASM OF SKIN: ICD-10-CM

## 2023-12-20 DIAGNOSIS — Z85.828 PERSONAL HISTORY OF OTHER MALIGNANT NEOPLASM OF SKIN: ICD-10-CM

## 2023-12-20 PROBLEM — D48.5 NEOPLASM OF UNCERTAIN BEHAVIOR OF SKIN: Status: ACTIVE | Noted: 2023-12-20

## 2023-12-20 PROBLEM — D18.01 HEMANGIOMA OF SKIN AND SUBCUTANEOUS TISSUE: Status: ACTIVE | Noted: 2023-12-20

## 2023-12-20 PROCEDURE — ? RECOMMENDATIONS

## 2023-12-20 PROCEDURE — 11102 TANGNTL BX SKIN SINGLE LES: CPT | Mod: 59

## 2023-12-20 PROCEDURE — 17004 DESTROY PREMAL LESIONS 15/>: CPT

## 2023-12-20 PROCEDURE — 99213 OFFICE O/P EST LOW 20 MIN: CPT | Mod: 25

## 2023-12-20 PROCEDURE — ? BIOPSY BY SHAVE METHOD

## 2023-12-20 PROCEDURE — ? LIQUID NITROGEN

## 2023-12-20 PROCEDURE — ? COUNSELING

## 2023-12-20 ASSESSMENT — LOCATION DETAILED DESCRIPTION DERM
LOCATION DETAILED: LEFT MEDIAL SUPERIOR CHEST
LOCATION DETAILED: RIGHT DISTAL DORSAL FOREARM
LOCATION DETAILED: RIGHT LATERAL MALAR CHEEK
LOCATION DETAILED: RIGHT PROXIMAL PRETIBIAL REGION
LOCATION DETAILED: LEFT FOREHEAD
LOCATION DETAILED: RIGHT INFERIOR CENTRAL MALAR CHEEK
LOCATION DETAILED: RIGHT SUPERIOR UPPER BACK
LOCATION DETAILED: LEFT SUPERIOR LATERAL MALAR CHEEK
LOCATION DETAILED: UPPER STERNUM
LOCATION DETAILED: RIGHT SUPERIOR HELIX
LOCATION DETAILED: RIGHT MEDIAL MALAR CHEEK
LOCATION DETAILED: RIGHT SUPERIOR LATERAL MALAR CHEEK
LOCATION DETAILED: STERNUM
LOCATION DETAILED: RIGHT SUPERIOR MEDIAL MALAR CHEEK
LOCATION DETAILED: LEFT CLAVICULAR SKIN
LOCATION DETAILED: SUPERIOR THORACIC SPINE
LOCATION DETAILED: RIGHT ANTERIOR SHOULDER
LOCATION DETAILED: RIGHT FOREHEAD
LOCATION DETAILED: RIGHT INFERIOR MEDIAL UPPER BACK
LOCATION DETAILED: LEFT INFERIOR MEDIAL UPPER BACK
LOCATION DETAILED: XIPHOID
LOCATION DETAILED: SUBXIPHOID
LOCATION DETAILED: LEFT SUPERIOR UPPER BACK
LOCATION DETAILED: RIGHT DISTAL PRETIBIAL REGION
LOCATION DETAILED: LEFT INFERIOR HELIX
LOCATION DETAILED: RIGHT SUPERIOR LATERAL BUCCAL CHEEK
LOCATION DETAILED: LEFT CENTRAL MALAR CHEEK
LOCATION DETAILED: LEFT ANTERIOR SHOULDER
LOCATION DETAILED: RIGHT MEDIAL SUPERIOR CHEST
LOCATION DETAILED: RIGHT PROXIMAL DORSAL FOREARM
LOCATION DETAILED: RIGHT SUPERIOR PREAURICULAR CHEEK
LOCATION DETAILED: PHILTRUM
LOCATION DETAILED: EPIGASTRIC SKIN
LOCATION DETAILED: LEFT ANTERIOR PROXIMAL UPPER ARM

## 2023-12-20 ASSESSMENT — LOCATION SIMPLE DESCRIPTION DERM
LOCATION SIMPLE: CHEST
LOCATION SIMPLE: LEFT EAR
LOCATION SIMPLE: LEFT UPPER ARM
LOCATION SIMPLE: RIGHT SHOULDER
LOCATION SIMPLE: LEFT FOREHEAD
LOCATION SIMPLE: RIGHT UPPER BACK
LOCATION SIMPLE: RIGHT FOREARM
LOCATION SIMPLE: RIGHT CHEEK
LOCATION SIMPLE: LEFT UPPER BACK
LOCATION SIMPLE: ABDOMEN
LOCATION SIMPLE: UPPER BACK
LOCATION SIMPLE: LEFT CLAVICULAR SKIN
LOCATION SIMPLE: LEFT CHEEK
LOCATION SIMPLE: RIGHT PRETIBIAL REGION
LOCATION SIMPLE: RIGHT EAR
LOCATION SIMPLE: LEFT SHOULDER
LOCATION SIMPLE: UPPER LIP
LOCATION SIMPLE: RIGHT FOREHEAD

## 2023-12-20 ASSESSMENT — LOCATION ZONE DERM
LOCATION ZONE: ARM
LOCATION ZONE: LIP
LOCATION ZONE: LEG
LOCATION ZONE: TRUNK
LOCATION ZONE: EAR
LOCATION ZONE: FACE

## 2023-12-20 NOTE — PROCEDURE: LIQUID NITROGEN
Show Applicator Variable?: Yes
Detail Level: Detailed
Number Of Freeze-Thaw Cycles: 1 freeze-thaw cycle
Consent: The patient's consent was obtained including but not limited to risks of crusting, scabbing, blistering, scarring, darker or lighter pigmentary change, recurrence, incomplete removal and infection.
Duration Of Freeze Thaw-Cycle (Seconds): 5
Post-Care Instructions: I reviewed with the patient in detail post-care instructions. Patient is to wear sunprotection, and avoid picking at any of the treated lesions. Pt may apply Vaseline to crusted or scabbing areas.
Render Note In Bullet Format When Appropriate: No
Application Tool (Optional): Liquid Nitrogen Sprayer

## 2023-12-20 NOTE — PROGRESS NOTES
Care Management Follow Up    Length of Stay (days): 5    Expected Discharge Date: TBD     Concerns to be Addressed: Discharge planning.   Patient plan of care discussed at interdisciplinary rounds: Yes    Anticipated Discharge Disposition: TCU  Anticipated Discharge Services: NA  Anticipated Discharge DME:  NA    Patient/family educated on Medicare website which has current facility and service quality ratings:  NA  Education Provided on the Discharge Plan: NA  Patient/Family in Agreement with the Plan: NA    Referrals Placed by CM/SW:  No referrals at this time.   Private pay costs discussed: Not applicable    Additional Information:  Writer spoke w/a nurse at the [facility] who confirmed that if the goal is for patient to regain strength and progress back to his baseline mobility, they would encourage TCU placement. [facility] would be able to accommodate patient's needs in a higher level of care portion of their facility, but home health PT/OT would only come once or twice a week and patient would likely not progress back to his baseline as quickly. Writer left a voicemail with the patient's son, Kan, awaiting call back.     [id number] Addendum:  Call back received from pt's son, Kan. Kan confirms that he too spoke w/[facility] yesterday and is on board with the patient going to TCU. SW will follow up for discharge planning.     Care coordination will continue to follow for discharge planning.     Jami De La Rosa, RNCC, BSN    [facility]    [address] [place] [id number]    [email or web address]    Office: [phone] Pager: [phone]     Patient last lipid 1/2022  Okay for 1 year of refills

## 2023-12-20 NOTE — PROCEDURE: BIOPSY BY SHAVE METHOD
Detail Level: Detailed
Depth Of Biopsy: epidermis
Was A Bandage Applied: Yes
Size Of Lesion In Cm: 0.7
X Size Of Lesion In Cm: 0
Biopsy Type: H and E
Biopsy Method: Personna blade
Anesthesia Type: 1% lidocaine without epinephrine and a 1:10 solution of 8.4% sodium bicarbonate
Anesthesia Volume In Cc: 1.5
Hemostasis: Mario's
Wound Care: Vaseline
Dressing: bandage
Destruction After The Procedure: No
Type Of Destruction Used: Curettage
Curettage Text: The wound bed was treated with curettage after the biopsy was performed.
Cryotherapy Text: The wound bed was treated with cryotherapy after the biopsy was performed.
Electrodesiccation Text: The wound bed was treated with electrodesiccation after the biopsy was performed.
Electrodesiccation And Curettage Text: The wound bed was treated with electrodesiccation and curettage after the biopsy was performed.
Silver Nitrate Text: The wound bed was treated with silver nitrate after the biopsy was performed.
Lab: -[id number]
Path Notes Override (Will Replace All Of The Above Text): Please check margins
Consent: Written consent was obtained and risks were reviewed including but not limited to scarring, infection, bleeding, scabbing, incomplete removal, nerve damage and allergy to anesthesia.
Post-Care Instructions: I reviewed with the patient in detail post-care instructions. Patient is to keep the biopsy site dry overnight, and then apply bacitracin twice daily until healed. Patient may apply hydrogen peroxide soaks to remove any crusting.
Notification Instructions: Patient will be notified of biopsy results. However, patient instructed to call the office if not contacted within 2 weeks.
Billing Type: Third-Party Bill
Information: Selecting Yes will display possible errors in your note based on the variables you have selected. This validation is only offered as a suggestion for you. PLEASE NOTE THAT THE VALIDATION TEXT WILL BE REMOVED WHEN YOU FINALIZE YOUR NOTE. IF YOU WANT TO FAX A PRELIMINARY NOTE YOU WILL NEED TO TOGGLE THIS TO 'NO' IF YOU DO NOT WANT IT IN YOUR FAXED NOTE.

## 2023-12-20 NOTE — PLAN OF CARE
"/66 (BP Location: Left arm)   Pulse 59   Temp 97.4  F (36.3  C) (Oral)   Resp 22   Ht 1.854 m (6' 0.99\")   Wt 79.5 kg (175 lb 4.3 oz)   SpO2 95%   BMI 23.13 kg/m      Neuro: A&Ox4 with intermittent confusion. Needs reminder to use call light for assistance. Very pleasant! [place].   Cardiac: VSS. Jose 59-60s.    Respiratory: Sating >95% on 2-6L NC. Increased O2 to 6L while sleeping d/t desating, utilized oxymask while sleeping but pt kept taking it off and reported disliking it.   GI/: Small urine output. Bladder scan 133ml. BM X2 soft/loose.   Diet/appetite: Regular diet with mildly thick liquids.   Activity:  Assist of 1, up to chair and in halls. Walker & GB.  Pain: Denies pain.  Skin: No new deficits noted.  LDA's: R & L PIV, both infusing -- D5 & TKO.     Plan: Continue with POC. Bed alarm on.       "

## 2023-12-20 NOTE — PROGRESS NOTES
[facility]    Progress Note - Medicine Service, MAROON TEAM 4       Date of Admission:  12/15/2023    Assessment & Plan   Jc Cloud is a 89 year old male admitted on 12/15/2023. He has a history of coronary artery disease status post CABG, A-fib and AV block on Eliquis, hypertension, depression and anxiety, sleep apnea, essential tremor, mild cognitive impairment, chronic kidney disease and presented  after being found confused this morning at his assisted living facility and was found to have acute toxic metabolic encephalopathy and acute hypoxic respiratory failure likely due to pneumonia (community-acquired versus aspiration) with hospital course complicated by GER on CKD and hypernatremia.     Changes today:  -Continues to improve from a mentation perspective  -Hypoxic respiratory failure improving  -Continue Zosyn  -Kidney function numbers worsening, continue to feel it is a prerenal/ATN picture  -Stopped D5 water for IV fluids as hypernatremia resolved  -Place Covarrubias catheter for accurate I&O measurement    # Acute hypoxic respiratory failure, improving  Possible etiology of his hypoxia on arrival includes community-acquired pneumonia or aspiration pneumonia.  While BNP and troponin are elevated, echocardiogram obtained by ED showed normal systolic function and no evidence of volume overload so I feel heart failure is less likely.  There is no evidence of volume overload on exam.  Evening of 12/17 he became more hypoxic with increased work of breathing.  It is possible he had an aspiration event either at that time or earlier in the day.  On 12/18 he was able to be weaned to low-flow nasal cannula with improvement in his work of breathing.  Still favor his hypoxia is related to community-acquired or aspiration pneumonia.  -Pneumonia treatment as below  -Supplemental oxygen as needed to maintain sats greater than 88%  -Monitor intake and output, daily  weights    # Acute toxic metabolic encephalopathy, improving  # C/f Urinary tract infection, resolved  # Community-acquired pneumonia vs aspiration pneumonia  Patient is known to have mild cognitive impairment at baseline, though his son states that he is usually a good historian and able to take care of most of his ADLs independently.  He does not previously had admissions for confusion.  This is most likely related to his urinary tract infection and possible pneumonia.  He may have had an aspiration event when he was found down on morning of presentation.  Unlikely to be stroke with nonfocal neurologic exam.  His electrolytes were normal, and no contributing medications.  He does have a chronic history of alcohol use with 1 drink at night.  Low suspicion that this is contributing.  Urine culture ultimately grew normal urogenital rodger.  -Follow urine and blood cultures from 12/15/2023; no growth to date  -Frequent reorientation  -PT/OT consults  -SLP cleared patient for regular diet with supervision; mildly thick liquids  -Hold potential contributing PTA meds including trazodone and gabapentin  -As needed Zyprexa available for sundowning; 2.5 mg    Antibiotics:  -Zosyn x 1 on 12/15  -ceftriaxone 2 g daily 12/15-12/17  -zosyn 12/17-through planned course of 12/22     # Hypernatremia, resolved  Has free water deficit of at least 1 L.  Nephrology consulted.  -D5 water 100 mL overnight at 12/19 - 12/20 with resolution  -A.m. BMP    # Acute kidney injury on CKD  Baseline creatinine around 1.5.  Close with a past 2 days his kidney function has significantly worsened with a creatinine of 2.4 the morning of 12/17.  Most likely feel like this is prerenal/hypovolemia related.  Had multiple hypotensive episodes during his hospitalization.  -Consult nephrology, appreciate recommendations  -Avoid toxic medications  -Renal ultrasound 12/18 grossly normal  -Urinalysis bland  -Trend BMP  - IVF for any poor PO intake    # Coronary  artery disease status post CABG  # Hypertension  # History of orthostatic hypotension  Possible that patient's orthostatic hypotension contributed to his falls and confusion, especially in the setting of infection.  TTE was obtained with left ventricular ejection fraction of 50% and evidence of pulmonary hypertension.  -Hold PTA metoprolol 12.5 mg twice daily given GER and episodes of hypotension  -Continue midodrine 5 mg oral twice daily  -Consider orthostatic vital signs     # Atrial fibrillation  # Second-degree AV block  # Status post biventricular pacemaker  Cardiac pacemaker was interrogated in the emergency department and was normal.   -Held apixaban on admission, restarted on 12/16     # Essential tremor  -Continue PTA primidone     # Depression/anxiety  -Continue PTA mirtazapine        Diet: Regular Diet Adult    DVT Prophylaxis: as above  Covarrubias Catheter: Not present  Fluids: none  Lines: None     Cardiac Monitoring: None  Code Status: No CPR- Pre-arrest intubation OK    Clinically Significant Risk Factors         # Hypernatremia: Highest Na = 149 mmol/L in last 2 days, will monitor as appropriate      # Hypoalbuminemia: Lowest albumin = 3.3 g/dL at 12/17/2023  4:15 PM, will monitor as appropriate      # Acute Kidney Injury, unspecified: based on a >150% or 0.3 mg/dL increase in last creatinine compared to past 90 day average, will monitor renal function  # Hypertension: Noted on problem list            # Financial/Environmental Concerns: none   # History of CABG: noted on surgical history       Disposition Plan      Expected Discharge Date: 12/22/2023      Destination: assisted living  Discharge Comments: once UTI is treated, confusion resolved and therapy has recs for placement      The patient's care was discussed with the  attending physician .    Dar Zaragoza MD  Medicine Service, [facility] TEAM [address] [place] [address]  Securely message with [place] [employer] (more info)  Text  page via [facility] Paging/Directory   See signed in provider for up to date coverage information  ______________________________________________________________________    Interval History   More awake and alert this morning.  He has no cough, no difficulty breathing, no pain.  He is feeling hungry and thirsty.  He is trying to breathe through his nose because after his oxygen is located.    Physical Exam   Vital Signs: Temp: 97.6  F (36.4  C) Temp src: Oral BP: 103/63 Pulse: 61   Resp: 16 SpO2: 92 % O2 Device: Nasal cannula Oxygen Delivery: 1 LPM  Weight: 175 lbs 4.25 oz    General Appearance: Elderly male, no acute distress, resting in chair  Eyes: Extraocular movements intact, clear sclera  HEENT: Moist mucous membranes  Respiratory: normal work of breathing, decreased breath sounds in the left lung base, no crackles, saturations at about 88% on room air  Cardiovascular: Regular rate and rhythm, no murmurs  GI: Soft, nontender, nondistended throughout  Skin: No obvious rashes or lesions on skin  Musculoskeletal: No lower extremity edema, no gross deformities, no obvious trauma  Neurologic: Oriented to self, location, able to follow one-step commands, no cranial nerve or other focal neurologic deficits    Medical Decision Making       Please see A&P for additional details of medical decision making.      Data     I have personally reviewed the following data over the past 24 hrs:    11.0  \   13.2 (L)   / 148 (L)     142 110 (H) 60.7 (H) /  127 (H)   3.8 16 (L) 3.64 (H) \       Imaging results reviewed over the past 24 hrs:   No results found for this or any previous visit (from the past 24 hour(s)).

## 2023-12-20 NOTE — PROGRESS NOTES
Care Management Follow Up    Length of Stay (days): 5    Expected Discharge Date: 12/22/2023     Concerns to be Addressed: discharge planning      Patient plan of care discussed at interdisciplinary rounds: Yes    Anticipated Discharge Disposition: Transitional Care Unit     Anticipated Discharge Services: Transitional Care Unit  Anticipated Discharge DME:  N/A    Patient/family educated on Medicare website which has current facility and service quality ratings:  Yes  Education Provided on the Discharge Plan:  Yes  Patient/Family in Agreement with the Plan:  Yes    Referrals Placed by CM/SW:  N/A  Private pay costs discussed: Not applicable    Additional Information:  DANK called pt's son Kan on the phone to discuss TCU medicare.gov list. Kan let DANK know to send the lists for both [id number] and [id number] zip codes to his email address [email or web address]. DANK let Kan know to choose between 5-10 facilities for DANK to send referrals to. Kan was agreeable. DANK will follow up with Kan for TCU choices tomorrow. Care management will follow for updates.    THEODORE Vital, ARMAAN  6B   Ph: [phone]  Pager: [phone]

## 2023-12-20 NOTE — PLAN OF CARE
B/P: 103/63, T: 97.6, P: 61, R: 16    Neuro: Alert and disoriented to place, time and situation. Impulsively getting up to commode and setting off bed alarm.  Cardiac: Not on telemetry. VSS.   Respiratory: Sating 94% on 1L nc. GTZ.   GI/: Covarrubias placed for strict I/O. Oliguria. Loose incontinent BM X5.   Diet/appetite: Tolerating reg diet. Eating well.  Activity:  Assist of 1-2, up to chair, commode and in halls.  Pain: Denies.   Skin: Reddened gluteal cleft. Barrier cream applied and skin cleansed more frequently.   LDA's: PIV x2. Covarrubias. NC 1-4L     Plan: Continue with POC. Notify primary team with changes.

## 2023-12-20 NOTE — PROGRESS NOTES
Nephrology Progress Note  12/20/2023         Assessment & Recommendations:   Jc Cloud is a 89 year old male admitted on 12/15/2023. He has a history of coronary artery disease status post CABG, a-fib and AV block on Eliquis, hypertension, depression and anxiety, sleep apnea, essential tremor, mild cognitive impairment, currently admitted for treatment of aspiration pneumonia and developed worsening GER in the setting of recurrent episodes of hypotension secondary of sepsis vs dehydration     The patient had 2 episodes of hypotension on 12/17 and 12/18 where pressures were in the 70's-80's/50's. Patient was given IV fluids and responded both times, but creatinine has subsequently been increasing. Appeared to have maybe plateaued on 12/19 labs, but unfortunately continued to increase today.    At this time, the etiology of the patient's GER is like still secondary to transient ischemia/hypoperfusion in the setting of recurrent episodes of hypotension. Renal ultrasound on 12/18/23 demonstrated bilateral renal atrophy L>R without hydronephrosis bilaterally as well as bladder debris that would be consistent with the patient's UTI. Repeat UA was grossly normal. At this time, clinical suspicion is that the patient is transitioning to the ATN phase of his kidney injury. It is unclear how much urine the patient has been producing as he has had several episode of incontinence. He is euvolemic on exam and has no electrolyte derangements that would indicate need for dialysis. In the event that he were to require dialysis, there would need to be an in-depth discussion with patient and his son regarding the benefits of dialysis given the patient's age and comorbidities.      The patient was also previously hypernatremic on morning labs yesterday, concerning for a negative fluid balance and this corrected with D5W, which has now been stopped as the patient's sodium has improved.      Recommendations   - Continue to  "monitor renal function   - No indication for more IV fluids at this time   - Avoid nephrotoxic medications where possible    Recommendations were communicated to primary team in person and via note     Seen and discussed with Dr. Estrella Chapa MD   Nephrology Teaching Service     Interval History :   Nursing and provider notes from last 24 hours reviewed.  In the last 24 hours     Physical Exam:   I/O last 3 completed shifts:  In: 2476.66 [P.O.:1470; I.V.:1006.66]  Out: 265 [Urine:265]   /71 (BP Location: Left arm)   Pulse 61   Temp 98.6  F (37  C) (Oral)   Resp 15   Ht 1.854 m (6' 0.99\")   Wt 79.5 kg (175 lb 4.3 oz)   SpO2 95%   BMI 23.13 kg/m       GENERAL APPEARANCE:   General: Frail-appearing, sleeping comfortably   HEENT: PERRLA, EOMi, mucous membranes are moist   Pulm/Resp: Some crackles noted at the right lung base, but no increased work of breathing. No wheezing appreciated.  CV: RRR, no murmurs, rubs, gallops   Abdomen: soft, non-distended, non-tender   Ext: Moves all 4 extremities without difficulty, no pitting edema, 2+ distal pulses   Neuro: Sleeping comfortably, opens eye to verbal stimulation   Skin: Warm and well perfused. Scattered seborrheic keratoses, but no other rashes or skin changes     Labs:   All labs reviewed by me  Electrolytes/Renal -   Recent Labs   Lab Test 12/20/23  0529 12/19/23  0539 12/18/23  2200 12/17/23  1615 12/17/23  0605 12/15/23  1038 12/15/23  0851    149* 146*   < > 142   < > 144   POTASSIUM 3.8 5.1 3.9   < > 4.9   < > 6.1*   CHLORIDE 110* 113* 112*   < > 109*   < > 111*   CO2 16* 23 20*   < > 20*   < > 24   BUN 60.7* 59.7* 60.6*   < > 39.2*   < > 30.7*   CR 3.64* 2.98* 2.98*   < > 1.69*   < > 1.46*   * 93 126*   < > 120*   < > 119*   MATT 7.3* 8.1* 7.8*   < > 8.3   < > 7.5*   MAG 1.9  --  2.3  --  2.1  --  2.0   PHOS  --   --   --   --  3.4  --  2.5    < > = values in this interval not displayed.       CBC -   Recent Labs   Lab Test " 12/20/23  0529 12/19/23  0539 12/18/23  2200   WBC 11.0 14.0* 12.1*   HGB 13.2* 13.8 12.9*   * 131* 130*       LFTs -   Recent Labs   Lab Test 12/17/23  1615 12/15/23  0851   ALKPHOS 85 99   BILITOTAL 0.7 0.6   ALT 18 20   AST 35 34   PROTTOTAL 5.9* 6.0*   ALBUMIN 3.3* 3.4*       Imaging:  US Renal Complete (12/18/23)  IMPRESSION:  1.  Left greater than right renal atrophy without hydronephrosis bilaterally.  2.  Echogenic bladder debris. This is nonspecific and can be seen with  infection or hematuria. Correlated with urinary symptoms and  urinalysis.    Per the radiologist's report which is filed     Current Medications:   albuterol  2.5 mg Nebulization 4x Daily    apixaban ANTICOAGULANT  2.5 mg Oral BID    folic acid  1 mg Oral QAM    [Held by provider] gabapentin  300 mg Oral BID    melatonin  5 mg Oral At Bedtime    [Held by provider] metoprolol succinate ER  12.5 mg Oral BID    midodrine  5 mg Oral BID    mirtazapine  15 mg Oral At Bedtime    multivitamin w/minerals  1 tablet Oral QAM    piperacillin-tazobactam  2.25 g Intravenous Q6H    primidone  25 mg Oral Daily    sodium chloride (PF)  3 mL Intracatheter Q8H    sodium chloride (PF)  3 mL Intracatheter Q8H    thiamine  100 mg Oral QAM      [Held by provider] D5W Stopped (12/20/23 0744)    - MEDICATION INSTRUCTIONS -       Richi Chapa MD

## 2023-12-21 NOTE — PROGRESS NOTES
Care Management Follow Up    Length of Stay (days): 6    Expected Discharge Date: 12/22/2023     Concerns to be Addressed: discharge plannig      Patient plan of care discussed at interdisciplinary rounds: Yes    Anticipated Discharge Disposition:  Yes     Anticipated Discharge Services:  Transitional Care Unit  Anticipated Discharge DME:  N/A    Patient/family educated on Medicare website which has current facility and service quality ratings:  Yes  Education Provided on the Discharge Plan: Yes   Patient/Family in Agreement with the Plan:  Yes    Referrals Placed by CM/SW:  N/A  Private pay costs discussed: Not applicable    Additional Information:  SW met pt's son Kan in the hospital near nursing station, as he requested to speak w/SW. Kan let SW know he forgot to hit send on the choices for TCU that he chose for his dad via email, but remembers some. DANK printed off TCU medicare.gov list and Kan chose these facilities:    [facility]  [address] [place] [address]  [address]  P: [phone]  F: [phone]     [facility]  [facility] [address] [place] [address]  [address]  P: [phone]  P: [phone] - Admissions  F: [phone]     [facility]  (P: [phone], F: [phone])     [facility]  [address] [facility] [address]  Admissions: [phone]   [place] Admissions: [phone]  F: [phone]     [facility]  [address] [place]  [address]  P: [phone]  F: [phone]     Kan let SW know he chose five more facilities that are in his email that he will additionally send over tonight. DANK will follow up with that and communicate with med team to see when pt is medically ready for discharge. Care management will follow for updates.    THEODORE Vital, LGSW  6B   Ph: [phone]  Pager: [phone]

## 2023-12-21 NOTE — PROGRESS NOTES
Nephrology Progress Note  12/21/2023         Assessment & Recommendations:   Jc Cloud is a 89 year old male admitted on 12/15/2023. He has a history of coronary artery disease status post CABG, a-fib and AV block on Eliquis, hypertension, depression and anxiety, sleep apnea, essential tremor, mild cognitive impairment, currently admitted for treatment of aspiration pneumonia and developed worsening GER in the setting of recurrent episodes of hypotension secondary of sepsis vs dehydration     The patient had 2 episodes of hypotension on 12/17 and 12/18 where pressures were in the 70's-80's/50's. Patient was given IV fluids and responded both times, but creatinine has subsequently been increasing. Appeared to have maybe plateaued on 12/19 labs, but unfortunately continued to increase today to 4.8    At this time, the etiology of the patient's GER is like still secondary to transient ischemia/hypoperfusion in the setting of recurrent episodes of hypotension. Renal ultrasound on 12/18/23 demonstrated bilateral renal atrophy L>R without hydronephrosis bilaterally as well as bladder debris that would be consistent with the patient's UTI. Repeat UA was grossly normal. At this time, clinical suspicion is that the patient is transitioning to the ATN phase of his kidney injury. It is unclear how much urine the patient has been producing as he has had several episode of incontinence. He is euvolemic on exam and has no electrolyte derangements that would indicate need for dialysis. In the event that he were to require dialysis, there would need to be an in-depth discussion with patient and his son regarding the benefits of dialysis given the patient's age and comorbidities.      The patient was also previously hypernatremic on morning labs yesterday, concerning for a negative fluid balance and this corrected with D5W, which has now been stopped as the patient's sodium has improved. Today Na agata to 146    "  Recommendations   - Continue to monitor renal function   - can give 250 ml LR bolus   - Avoid nephrotoxic medications where possible  -Repeat UA     Recommendations were communicated to primary team in person and via note     Seen and discussed with Dr. Estrella Vu MD   Nephrology Teaching Service     Interval History :   Nursing and provider notes from last 24 hours reviewed.  In the last 24 hours     Physical Exam:   I/O last 3 completed shifts:  In: 1018.33 [P.O.:400; I.V.:618.33]  Out: 450 [Urine:450]   /69 (BP Location: Left arm)   Pulse 59   Temp 98  F (36.7  C) (Oral)   Resp 18   Ht 1.854 m (6' 0.99\")   Wt 79.5 kg (175 lb 4.3 oz)   SpO2 98%   BMI 23.13 kg/m       GENERAL APPEARANCE:   General: Frail-appearing, sleeping comfortably   HEENT: PERRLA, EOMi, mucous membranes are moist   Pulm/Resp: Some crackles noted at the right lung base, but no increased work of breathing. No wheezing appreciated.  CV: RRR, no murmurs, rubs, gallops   Abdomen: soft, non-distended, non-tender   Ext: Moves all 4 extremities without difficulty, no pitting edema, 2+ distal pulses   Neuro: Sleeping comfortably, opens eye to verbal stimulation   Skin: Warm and well perfused. Scattered seborrheic keratoses, but no other rashes or skin changes     Labs:   All labs reviewed by me  Electrolytes/Renal -   Recent Labs   Lab Test 12/21/23  0525 12/20/23  0529 12/19/23  0539 12/18/23  2200 12/17/23  1615 12/17/23  0605 12/15/23  1038 12/15/23  0851   * 142 149* 146*   < > 142   < > 144   POTASSIUM 4.0 3.8 5.1 3.9   < > 4.9   < > 6.1*   CHLORIDE 112* 110* 113* 112*   < > 109*   < > 111*   CO2 18* 16* 23 20*   < > 20*   < > 24   BUN 65.5* 60.7* 59.7* 60.6*   < > 39.2*   < > 30.7*   CR 4.80* 3.64* 2.98* 2.98*   < > 1.69*   < > 1.46*   GLC 97 127* 93 126*   < > 120*   < > 119*   MATT 7.4* 7.3* 8.1* 7.8*   < > 8.3   < > 7.5*   MAG 2.1 1.9  --  2.3  --  2.1  --  2.0   PHOS  --   --   --   --   --  3.4  --  2.5 "    < > = values in this interval not displayed.       CBC -   Recent Labs   Lab Test 12/20/23  0529 12/19/23  0539 12/18/23  2200   WBC 11.0 14.0* 12.1*   HGB 13.2* 13.8 12.9*   * 131* 130*       LFTs -   Recent Labs   Lab Test 12/17/23  1615 12/15/23  0851   ALKPHOS 85 99   BILITOTAL 0.7 0.6   ALT 18 20   AST 35 34   PROTTOTAL 5.9* 6.0*   ALBUMIN 3.3* 3.4*       Imaging:  US Renal Complete (12/18/23)  IMPRESSION:  1.  Left greater than right renal atrophy without hydronephrosis bilaterally.  2.  Echogenic bladder debris. This is nonspecific and can be seen with  infection or hematuria. Correlated with urinary symptoms and  urinalysis.    Per the radiologist's report which is filed     Current Medications:   albuterol  2.5 mg Nebulization 2 times daily    amoxicillin-clavulanate  1 tablet Oral Q12H [facility] (08/20)    apixaban ANTICOAGULANT  2.5 mg Oral BID    folic acid  1 mg Oral QAM    [Held by provider] gabapentin  300 mg Oral BID    melatonin  5 mg Oral At Bedtime    [Held by provider] metoprolol succinate ER  12.5 mg Oral BID    midodrine  5 mg Oral BID    mirtazapine  15 mg Oral At Bedtime    multivitamin w/minerals  1 tablet Oral QAM    primidone  25 mg Oral Daily    sodium chloride (PF)  3 mL Intracatheter Q8H    sodium chloride (PF)  3 mL Intracatheter Q8H    thiamine  100 mg Oral QAM      - MEDICATION INSTRUCTIONS -       Feroz Vu MD

## 2023-12-21 NOTE — PLAN OF CARE
"/71 (BP Location: Left arm)   Pulse 60   Temp 99.2  F (37.3  C) (Axillary)   Resp 20   Ht 1.854 m (6' 0.99\")   Wt 79.5 kg (175 lb 4.3 oz)   SpO2 97%   BMI 23.13 kg/m      Neuro: A&Ox3-4 with intermittent confusion + disorientation to time & situation. Needs reminder to use call light for assistance. Very pleasant! [place].   Cardiac: VSS. Jose 59-60s. Permanent pacemaker.               Respiratory: Sating >95% on 1-6L NC. Increase O2 as needed during sleep d/t GLORIA.   GI/: Covarrubias, with minimal urine output -- AM Cr is 4.80. BM x1, diarrhea.   Diet/appetite: Regular diet with mildly thick liquids.   Activity:  Assist of 1, up to chair and in halls. Walker & GB.  Pain: Denies pain.  Skin: No new deficits noted.  LDA's: R & L PIV, SL       Plan: Continue with POC. Bed alarm on.     "

## 2023-12-21 NOTE — PROGRESS NOTES
[facility]    Progress Note - Medicine Service, [facility] TEAM 4       Date of Admission:  12/15/2023    Assessment & Plan   Jc Cloud is a 89 year old male admitted on 12/15/2023. He has a history of coronary artery disease status post CABG, A-fib and AV block on Eliquis, hypertension, depression and anxiety, sleep apnea, essential tremor, mild cognitive impairment, chronic kidney disease and presented  after being found confused this morning at his assisted living facility and was found to have acute toxic metabolic encephalopathy and acute hypoxic respiratory failure likely due to pneumonia (community-acquired versus aspiration) with hospital course complicated by GER on CKD and hypernatremia.     Changes today:  -Continues to have improved mentation  -Creatinine continuing to uptrend, appreciate nephrology recommendations  -likely will require TCU placement after hospitalization    # Acute hypoxic respiratory failure, improving  Possible etiology of his hypoxia on arrival includes community-acquired pneumonia or aspiration pneumonia.  While BNP and troponin are elevated, echocardiogram obtained by ED showed normal systolic function and no evidence of volume overload so I feel heart failure is less likely.  There is no evidence of volume overload on exam.  Evening of 12/17 he became more hypoxic with increased work of breathing.  It is possible he had an aspiration event either at that time or earlier in the day.  On 12/18 he was able to be weaned to low-flow nasal cannula with improvement in his work of breathing.  Still favor his hypoxia is related to community-acquired or aspiration pneumonia, with possible component of atelectasis given his prolonged hospitalization.  -Pneumonia treatment as below  -Supplemental oxygen as needed to maintain sats greater than 88%  -Monitor intake and output, daily weights  -Incentive spirometry    # Acute toxic metabolic  encephalopathy, improving  # C/f Urinary tract infection, resolved  # Community-acquired pneumonia vs aspiration pneumonia  Patient is known to have mild cognitive impairment at baseline, though his son states that he is usually a good historian and able to take care of most of his ADLs independently.  He does not previously had admissions for confusion.  This is most likely related to his community-acquired pneumonia/aspiration pneumonia.  He may have had an aspiration event when he was found down on morning of presentation.  Unlikely to be stroke with nonfocal neurologic exam.  His electrolytes were normal, and no contributing medications.  He does have a chronic history of alcohol use with 1 drink at night.  Low suspicion that this is contributing.  Urine culture ultimately grew normal urogenital rodger.  -Follow urine and blood cultures from 12/15/2023; no growth to date  -Frequent reorientation  -PT/OT consults  -SLP cleared patient for regular diet with supervision; mildly thick liquids  -Hold potential contributing PTA meds including trazodone and gabapentin  -As needed Zyprexa available for sundowning; 2.5 mg    Antibiotics:  -Zosyn x 1 on 12/15  -ceftriaxone 2 g daily 12/15-12/17  -zosyn 12/17-12/21  -Augmentin renally dosed 12/21-12/22 to complete course     # Hypernatremia  Has free water deficit of at least 1 L.  Nephrology consulted.  -D5 water 100 mL overnight at 12/19 - 12/20; may need this again today but he appears to have intact thirst mechanism  -A.m. BMP    # Acute kidney injury on CKD  Baseline creatinine around 1.5.  Kidney function continues to worsen with rising creatinine.  Most likely feel like this is prerenal/hypovolemia related.  Had multiple hypotensive episodes during his hospitalization.  Urine output is marginal.  Per nursing he is eating and drinking normally.  -Consult nephrology, appreciate recommendations  -Avoid toxic medications  -Renal ultrasound 12/18 grossly  normal  -Urinalysis bland  -Trend BMP  - IVF for any poor PO intake    # Coronary artery disease status post CABG  # Hypertension  # History of orthostatic hypotension  Possible that patient's orthostatic hypotension contributed to his falls and confusion, especially in the setting of infection.  TTE was obtained with left ventricular ejection fraction of 50% and evidence of pulmonary hypertension.  -Hold PTA metoprolol 12.5 mg twice daily given GER and episodes of hypotension  -Continue midodrine 5 mg oral twice daily     # Atrial fibrillation  # Second-degree AV block  # Status post biventricular pacemaker  Cardiac pacemaker was interrogated in the emergency department and was normal.   -Held apixaban on admission, restarted on 12/16     # Essential tremor  -Continue PTA primidone     # Depression/anxiety  -Continue PTA mirtazapine        Diet: Regular Diet Adult    DVT Prophylaxis: as above  Covarrubias Catheter: Not present  Fluids: none  Lines: None     Cardiac Monitoring: None  Code Status: No CPR- Pre-arrest intubation OK    Clinically Significant Risk Factors         # Hypernatremia: Highest Na = 146 mmol/L in last 2 days, will monitor as appropriate      # Hypoalbuminemia: Lowest albumin = 3.3 g/dL at 12/17/2023  4:15 PM, will monitor as appropriate      # Acute Kidney Injury, unspecified: based on a >150% or 0.3 mg/dL increase in last creatinine compared to past 90 day average, will monitor renal function  # Hypertension: Noted on problem list            # Financial/Environmental Concerns: none   # History of CABG: noted on surgical history       Disposition Plan      Expected Discharge Date: 12/22/2023      Destination: assisted living  Discharge Comments: once on room air, kidney function is improving and dispo plan in place      The patient's care was discussed with the  attending physician .    Dar Zaragoza MD  Medicine Service, [facility] TEAM [address] [place] [address]  Center  Securely message with [employer] (more info)  Text page via [facility] Paging/Directory   See signed in provider for up to date coverage information  ______________________________________________________________________    Interval History   Awake and alert this morning.  He has no cough, no difficulty breathing, no pain, no fevers.  He is feeling hungry and thirsty.  He is enjoying watching a show about [employer].     Physical Exam   Vital Signs: Temp: 98  F (36.7  C) Temp src: Oral BP: 112/69 Pulse: 59   Resp: 18 SpO2: 98 % O2 Device: Nasal cannula Oxygen Delivery: 4 LPM  Weight: 175 lbs 4.25 oz    General Appearance: Elderly male, no acute distress, lying in bed  Eyes: Extraocular movements intact, clear sclera  HEENT: Moist mucous membranes  Respiratory: normal work of breathing, decreased breath sounds in the left lung base, no crackles, saturations at about 88% on room air  Cardiovascular: Regular rate and rhythm, no murmurs  GI: Soft, nontender, nondistended throughout  Skin: No obvious rashes or lesions on skin  Musculoskeletal: No lower extremity edema, no gross deformities, no obvious trauma  Neurologic: Oriented to self, location, able to follow one-step commands, no cranial nerve or other focal neurologic deficits    Medical Decision Making       Please see A&P for additional details of medical decision making.      Data     I have personally reviewed the following data over the past 24 hrs:    N/A  \   N/A   / N/A     146 (H) 112 (H) 65.5 (H) /  97   4.0 18 (L) 4.80 (H) \       Imaging results reviewed over the past 24 hrs:   No results found for this or any previous visit (from the past 24 hour(s)).

## 2023-12-21 NOTE — PROGRESS NOTES
CLINICAL NUTRITION SERVICES - BRIEF NOTE     Nutrition Prescription    RECOMMENDATIONS FOR MDs/PROVIDERS TO ORDER:  Monitor ability for pt to consume at least ~1200 Kcals per russell cts and ~60 gm protein daily (65% est needs) vs need to consider nutrition support IF aligns with pt GOC (advanced age, no CPR but pre-arrest intubation per code status)     Recommendations already ordered by Registered Dietitian (RD):  Russell cts 12/21-12/23   RS with assistance initiated   Ensure Plus BID thickened per diet order, Magic Cup BID thickened per diet order     Future/Additional Recommendations:  Continue to monitor nutrition related findings per protocol    For last full RD assessment, see note dated 12/18/23    NEW FINDINGS   - Bedside RN reports pt declined breakfast this AM; low PO intake/appetite, needs encouragement.   - RN requesting RS with assistance initiation and asking if ok to order russell cts. Writer ordered both russell cts and RS assistance. Additionally, modified supplement order, sending both Ensure Plus (thickened per diet order) and Magic Cup with meal trays (both BID) for variety.  - Monitor PO intake, supplement preferences, nutrition POC.    INTERVENTIONS  Implementation  Collaboration with other providers - Bedside RN   Enteral Nutrition - Recs in former RD note if becomes POC, if aligns with pt GOC   Medical food supplement therapy - QID, variety of flavors/types  Modify composition of meals/snacks - RS with assistance   Russell cts 12/21-12/23    Monitoring/Evaluation  Will continue to monitor and evaluate per protocol.    Farrukh Ocampo, MS, RDN, LD, CNSC  Available via phone or [employer] chat, and pager  6B work-room RD phone: *[phone]   6B/Obs RD pager: [phone]         Weekend/Holiday RD pager [phone]

## 2023-12-21 NOTE — PLAN OF CARE
Neuro: A&Ox1 (self only). Forgetful. Reassurance provided. Neuros unchanged.  Cardiac: No tele order. Pacemaker in place (AV-paced). HR's 50-60's. BP's soft 100's/60's. MAPs >65. Afebrile.       Respiratory: Sating >91% on RA (was 1L). GTZ. LS clear/diminished.  GI/: Minimal cloudy UOP w/sediment via lozoya. Strict I/O's. C/o diarrhea and abd discomfort. MD's aware.  Diet/appetite: Tolerating regular diet w/mildy thick liquids + snacks/supps. Good appetite. Encouraged PO intake. Denied N/V. SLP following.  Activity: Assist of 1 + gait belt + walker. Up to chair x1. Repositioned q2hrs + PRN. PT/OT.  Pain: Denied.   Skin: Perineum/groin red, skin barrier applied.  LDA's: R PIV -SL; L PIV - SL.      Plan: Will continue with POC and notify primary team with any changes.

## 2023-12-22 NOTE — PLAN OF CARE
Goal Outcome Evaluation:      Neuro: A&Ox2-3.  Pt waxes and weans on orientation.  Cardiac: No tele ay this time. VSS.   Respiratory: Sating 92%> on 1-4 LPM via NC .  If pt is sleeping needs up to 4 LPM.  GI/: Adequate urine output. BM X4  Diet/appetite: Tolerating regular diet into lozoya. Eating well.  Activity:  Assist of 2, up to chair and commode.  Pain: At acceptable level on current regimen.   Skin: No new deficits noted.  LDA's:2 PIV, lozoya    Plan: Continue with POC. Notify primary team with changes.

## 2023-12-22 NOTE — PROGRESS NOTES
Nephrology Progress Note  12/22/2023         Assessment & Recommendations:   Jc Cloud is a 89 year old male admitted on 12/15/2023. He has a history of coronary artery disease status post CABG, a-fib and AV block on Eliquis, hypertension, depression and anxiety, sleep apnea, essential tremor, mild cognitive impairment, currently admitted for treatment of aspiration pneumonia and developed worsening GER in the setting of recurrent episodes of hypotension secondary of sepsis vs dehydration.     The patient had 2 episodes of hypotension on 12/17 and 12/18 where pressures were in the 70's-80's/50's. Patient was given IV fluids and responded both times, but creatinine has subsequently been increasing. Appeared to have maybe plateaued on 12/19 labs, but unfortunately continued to increase today to 5.63    At this time, the etiology of the patient's GER is like still secondary to transient ischemia/hypoperfusion in the setting of recurrent episodes of hypotension. Renal ultrasound on 12/18/23 demonstrated bilateral renal atrophy L>R without hydronephrosis bilaterally as well as bladder debris that would be consistent with the patient's UTI. Repeat UA was grossly normal. At this time, clinical suspicion is that the patient is transitioning to the ATN phase of his kidney injury. Given his history of CKD, anticipate a longer course before his creatinine begins to improve. Patient's urine output has increased from days prior, which is reassuring. He is euvolemic on exam and has no electrolyte derangements that would indicate need for dialysis. In the event that he were to require dialysis, there would need to be an in-depth discussion with patient and his son regarding the benefits of dialysis given the patient's age and comorbidities.      The patient was also previously hypernatremic, concerning for a negative fluid balance and this corrected with D5W, which was stopped as the patient's sodium has improved. Sodium  "once again rising, now at 147, so consider restarting D5 fluids      Recommendations   - D5W at 50 mLs/hr for 24 hour, can follow with AM labs   - Continue to monitor renal function   - Avoid nephrotoxic medications where possible    Recommendations were communicated to primary team in person and via note     Seen and discussed with Dr. Estrella Chapa MD   Nephrology Teaching Service     Interval History :   Nursing and provider notes from last 24 hours reviewed.  The patient continues to feel well and denies having pain anywhere. He is eating normally and is drinking some fluids, though patient reportedly doesn't like the thickened liquids. He has also had some loose stools, though    Physical Exam:   I/O last 3 completed shifts:  In: 1437 [P.O.:1437]  Out: 800 [Urine:800]   BP (!) 144/83 (BP Location: Left arm)   Pulse 69   Temp 97.6  F (36.4  C) (Oral)   Resp 18   Ht 1.854 m (6' 0.99\")   Wt 79.5 kg (175 lb 4.3 oz)   SpO2 96%   BMI 23.13 kg/m       GENERAL APPEARANCE:   General: Frail-appearing, enjoying breakfast   HEENT: PERRLA, EOMi, mucous membranes are moist   Pulm/Resp: Some crackles noted at the right lung base, but no increased work of breathing. No wheezing appreciated.  CV: RRR, no murmurs, rubs, gallops   Abdomen: soft, non-distended, non-tender   Ext: Moves all 4 extremities without difficulty, no pitting edema, 2+ distal pulses   Neuro: Answers questions appropriately, moving all 4 extremities without difficulty.  Skin: Warm and well perfused. Scattered seborrheic keratoses, but no other rashes or skin changes     Labs:   All labs reviewed by me  Electrolytes/Renal -   Recent Labs   Lab Test 12/22/23  0614 12/21/23  0525 12/20/23  0529 12/17/23  1615 12/17/23  0605 12/15/23  1038 12/15/23  0851   * 146* 142   < > 142   < > 144   POTASSIUM 4.5 4.0 3.8   < > 4.9   < > 6.1*   CHLORIDE 114* 112* 110*   < > 109*   < > 111*   CO2 16* 18* 16*   < > 20*   < > 24   BUN 70.7* 65.5* " 60.7*   < > 39.2*   < > 30.7*   CR 5.63* 4.80* 3.64*   < > 1.69*   < > 1.46*   GLC 99 97 127*   < > 120*   < > 119*   MATT 7.6* 7.4* 7.3*   < > 8.3   < > 7.5*   MAG 2.2 2.1 1.9   < > 2.1  --  2.0   PHOS  --   --   --   --  3.4  --  2.5    < > = values in this interval not displayed.       CBC -   Recent Labs   Lab Test 12/20/23  0529 12/19/23  0539 12/18/23  2200   WBC 11.0 14.0* 12.1*   HGB 13.2* 13.8 12.9*   * 131* 130*       LFTs -   Recent Labs   Lab Test 12/17/23  1615 12/15/23  0851   ALKPHOS 85 99   BILITOTAL 0.7 0.6   ALT 18 20   AST 35 34   PROTTOTAL 5.9* 6.0*   ALBUMIN 3.3* 3.4*       Imaging:  US Renal Complete (12/18/23)  IMPRESSION:  1.  Left greater than right renal atrophy without hydronephrosis bilaterally.  2.  Echogenic bladder debris. This is nonspecific and can be seen with  infection or hematuria. Correlated with urinary symptoms and  urinalysis.    Per the radiologist's report which is filed     Current Medications:   amoxicillin-clavulanate  1 tablet Oral Q12H [facility] (08/20)    apixaban ANTICOAGULANT  2.5 mg Oral BID    folic acid  1 mg Oral QAM    [Held by provider] gabapentin  300 mg Oral BID    melatonin  5 mg Oral At Bedtime    [Held by provider] metoprolol succinate ER  12.5 mg Oral BID    midodrine  5 mg Oral BID    mirtazapine  15 mg Oral At Bedtime    multivitamin w/minerals  1 tablet Oral QAM    primidone  25 mg Oral Daily    sodium chloride (PF)  3 mL Intracatheter Q8H    sodium chloride (PF)  3 mL Intracatheter Q8H    thiamine  100 mg Oral QAM      - MEDICATION INSTRUCTIONS -       Richi Chapa MD

## 2023-12-22 NOTE — PLAN OF CARE
Goal Outcome Evaluation:    Neuro: A&O x 2-3. Able to make needs known.   Cardiac: HR 60's. VSS.   Respiratory: Sating mid 90's on 4L NC when sleeping. Requires less O2 when awake.  GI/: Low to low normal output. Covarrubias draining clear yellow urine.   Diet/appetite: Tolerating regular diet. Patient doesn't like thickened water but is ok with the thickener in apple juice.  Activity:  Assist of 2 to bedside commode. Loose BM x 1 overnight.  Pain: At acceptable level on current regimen. Denies  Skin: No new deficits noted. Scattered bruising  LDA's: 1 left PIV and 1 right PIV, both SL    Plan: Continue with POC. Notify primary team with changes.

## 2023-12-22 NOTE — PROGRESS NOTES
[facility]    Progress Note - Medicine Service, MAROON TEAM 4       Date of Admission:  12/15/2023    Assessment & Plan   Jc Cloud is a 89 year old male admitted on 12/15/2023. He has a history of coronary artery disease status post CABG, A-fib and AV block on Eliquis, hypertension, depression and anxiety, sleep apnea, essential tremor, mild cognitive impairment, chronic kidney disease and presented  after being found confused this morning at his assisted living facility and was found to have acute toxic metabolic encephalopathy and acute hypoxic respiratory failure likely due to pneumonia (community-acquired versus aspiration) with hospital course complicated by GER on CKD and hypernatremia.     Changes today:  -Finishing antibiotics for pneumonia today  -Continue to monitor kidney function  -Hope to discharge early next week    # Acute hypoxic respiratory failure, improving  Possible etiology of his hypoxia on arrival includes community-acquired pneumonia or aspiration pneumonia.  While BNP and troponin are elevated, echocardiogram obtained by ED showed normal systolic function and no evidence of volume overload so I feel heart failure is less likely.  There is no evidence of volume overload on exam.  Evening of 12/17 he became more hypoxic with increased work of breathing.  It is possible he had an aspiration event either at that time or earlier in the day.  On 12/18 he was able to be weaned to low-flow nasal cannula with improvement in his work of breathing.  Still favor his hypoxia is related to community-acquired or aspiration pneumonia, with possible component of atelectasis given his prolonged hospitalization.  -Pneumonia treatment as below  -Supplemental oxygen as needed to maintain sats greater than 88%  -Monitor intake and output, daily weights  -Incentive spirometry    # Acute toxic metabolic encephalopathy, resolved  # C/f Urinary tract infection,  resolved  # Community-acquired pneumonia vs aspiration pneumonia  Patient is known to have mild cognitive impairment at baseline, though his son states that he is usually a good historian and able to take care of most of his ADLs independently.  He does not previously had admissions for confusion.  This is most likely related to his community-acquired pneumonia/aspiration pneumonia.  He may have had an aspiration event when he was found down on morning of presentation.  Unlikely to be stroke with nonfocal neurologic exam.  His electrolytes were normal, and no contributing medications.  He does have a chronic history of alcohol use with 1 drink at night.  Low suspicion that this is contributing.  Urine culture ultimately grew normal urogenital rodger.  -Follow urine and blood cultures from 12/15/2023; no growth to date  -Frequent reorientation  -PT/OT consults  -SLP cleared patient for regular diet with supervision; mildly thick liquids  -Hold potential contributing PTA meds including trazodone and gabapentin  -As needed Zyprexa available for sundowning; 2.5 mg    Antibiotics:  -Zosyn x 1 on 12/15  -ceftriaxone 2 g daily 12/15-12/17  -zosyn 12/17-12/21  -Augmentin renally dosed 12/21-12/22 to complete course     # Hypernatremia, stable  Nephrology consulted.  -D5 water 100 mL overnight at 12/19 - 12/20; may need this again today but he appears to have intact thirst mechanism  -Encourage p.o. fluids  -A.m. BMP    # Acute kidney injury on CKD3a  # Anion Gap Metabolic Acidosis  # Lactic Acidosis,resolved  Baseline creatinine around 1.5.  Kidney function continues to worsen with rising creatinine.  Most likely feel like this is prerenal/hypovolemia related.  Had multiple hypotensive episodes during his hospitalization.  He is still making urine.  Per nursing he is eating and drinking normally.  -Consult nephrology, appreciate recommendations  -Avoid toxic medications  -Renal ultrasound 12/18 grossly normal  -Urinalysis  bland  -Trend BMP  - IVF for any poor PO intake    # Coronary artery disease status post CABG  # Hypertension  # History of orthostatic hypotension  Possible that patient's orthostatic hypotension contributed to his falls and confusion, especially in the setting of infection.  TTE was obtained with left ventricular ejection fraction of 50% and evidence of pulmonary hypertension.  -Hold PTA metoprolol 12.5 mg twice daily given GER and episodes of hypotension  -Continue midodrine 5 mg oral twice daily     # Atrial fibrillation  # Second-degree AV block  # Status post biventricular pacemaker  Cardiac pacemaker was interrogated in the emergency department and was normal.   -Held apixaban on admission, restarted on 12/16     # Essential tremor  -Continue PTA primidone     # Depression/anxiety  -Continue PTA mirtazapine        Diet: Regular Diet Adult    DVT Prophylaxis: as above  Covarrubias Catheter: Not present  Fluids: none  Lines: None     Cardiac Monitoring: None  Code Status: No CPR- Pre-arrest intubation OK    Clinically Significant Risk Factors         # Hypernatremia: Highest Na = 147 mmol/L in last 2 days, will monitor as appropriate      # Hypoalbuminemia: Lowest albumin = 3.3 g/dL at 12/17/2023  4:15 PM, will monitor as appropriate      # Acute Kidney Injury, unspecified: based on a >150% or 0.3 mg/dL increase in last creatinine compared to past 90 day average, will monitor renal function  # Hypertension: Noted on problem list            # Financial/Environmental Concerns: none   # History of CABG: noted on surgical history       Disposition Plan      Expected Discharge Date: 12/23/2023      Destination: assisted living  Discharge Comments: once on room air, kidney function is improving and dispo plan in place      The patient's care was discussed with the  attending physician .    Dar Zaragoza MD  Medicine Service, [address]  Securely message with  [employer] (more info)  Text page via [facility] Paging/Directory   See signed in provider for up to date coverage information  ______________________________________________________________________    Interval History   Awake and alert this morning.  Looking forward to eating breakfast.  He has no cough and feels his breathing is at his baseline.  He has more energy than yesterday.    Physical Exam   Vital Signs: Temp: 97.6  F (36.4  C) Temp src: Oral BP: (!) 144/83 Pulse: 69   Resp: 18 SpO2: 96 % O2 Device: Nasal cannula Oxygen Delivery: 1 LPM  Weight: 175 lbs 4.25 oz    General Appearance: Elderly male, no acute distress, sitting up eating breakfast  Eyes: Extraocular movements intact, clear sclera  HEENT: Moist mucous membranes  Respiratory: normal work of breathing, decreased breath sounds in the left lung base, no crackles, saturations at about 88% on room air  Cardiovascular: Regular rate and rhythm, no murmurs  GI: Soft, nontender, nondistended throughout  Skin: No obvious rashes or lesions on skin  Musculoskeletal: No lower extremity edema, no gross deformities, no obvious trauma  Neurologic: Oriented to self, location, able to follow one-step commands, no cranial nerve or other focal neurologic deficits    Medical Decision Making       Please see A&P for additional details of medical decision making.      Data     I have personally reviewed the following data over the past 24 hrs:    N/A  \   N/A   / N/A     147 (H) 114 (H) 70.7 (H) /  99   4.5 16 (L) 5.63 (H) \       Imaging results reviewed over the past 24 hrs:   No results found for this or any previous visit (from the past 24 hour(s)).

## 2023-12-22 NOTE — PROGRESS NOTES
Calorie Count  Intake recorded for: 12/21  Total Kcals: 1630 Total Protein: 74g  Kcals from Hospital Food: 1630   Protein: 74g  Kcals from Outside Food (average):0 Protein: 0g  # Meals Ordered from Kitchen: 3  # Meals Recorded: 2  (1st: 100% 1 pancake w/ butter and syrup, scrambled eggs, 2 henderson, 1 wheat toast w/jelly, 8oz 1% milk)  (2nd: 100% 1 chocolate pudding)  # Supplements Recorded: 2 (100% 2 Ensure Enlive)

## 2023-12-22 NOTE — PROGRESS NOTES
Care Management Follow Up    Length of Stay (days): 7    Expected Discharge Date: 12/23/2023     Concerns to be Addressed:  discharge planning     Patient plan of care discussed at interdisciplinary rounds: Yes    Anticipated Discharge Disposition:  transitional care unit     Anticipated Discharge Services:  transitional care unit  Anticipated Discharge DME:  n/a    Patient/family educated on Medicare website which has current facility and service quality ratings:  Yes  Education Provided on the Discharge Plan: Yes   Patient/Family in Agreement with the Plan:  Yes    Referrals Placed by CM/SW:    Private pay costs discussed: Not applicable    Additional Information:  Pt's son Kan sent out the official list of TCU choices for pt through email:    [facility]  [address] [place]  [address]  P: [phone]  F: [phone]      [facility]  [facility] [address] [place] [address]  [address]  P: [phone]  P: [phone] - Admissions  F: [phone]      [facility]  (P: [phone], F: [phone])      [place]  [address] [facility] [address]  Admissions: [phone]   [place]/e Admissions: [phone]  F: [phone]      [facility]  [address] [place]  [address]  P: [phone]  F: [phone]     [facility]  [address] [place], [place] [address] [phone]    [facility]  [address] [place] [address]  [phone]    [facility]   [address] [place] [address]  [phone]    [address] [address] [phone]    [facility]   [address] [place], [place] [address] [phone]    [facility]   [address] [place] [address]  [phone]    SW will follow for updates.    THEODORE Vital, LGSW  6B   Ph:  [phone]  Pager: [phone]

## 2023-12-23 NOTE — PROGRESS NOTES
Nephrology Progress Note  12/23/2023         Assessment & Recommendations:   Jc Cloud is a 89 year old male admitted on 12/15/2023. He has a history of coronary artery disease status post CABG, a-fib and AV block on Eliquis, hypertension, depression and anxiety, sleep apnea, essential tremor, mild cognitive impairment, currently admitted for treatment of aspiration pneumonia and developed worsening GER in the setting of recurrent episodes of hypotension secondary of sepsis vs dehydration.     The patient had 2 episodes of hypotension on 12/17 and 12/18 where pressures were in the 70's-80's/50's. Patient was given IV fluids and responded both times, but creatinine has subsequently been increasing. Appeared to have maybe plateaued on 12/19 labs, but unfortunately continued to increase today to 5.63    At this time, the etiology of the patient's GER is like still secondary to transient ischemia/hypoperfusion in the setting of recurrent episodes of hypotension. Renal ultrasound on 12/18/23 demonstrated bilateral renal atrophy L>R without hydronephrosis bilaterally as well as bladder debris that would be consistent with the patient's UTI. Repeat UA was grossly normal. At this time, clinical suspicion is that the patient is transitioning to the ATN phase of his kidney injury. Given his history of CKD, anticipate a longer course before his creatinine begins to improve. Patient's urine output has increased from days prior, which is reassuring. He is euvolemic on exam and has no electrolyte derangements that would indicate need for dialysis. In the event that he were to require dialysis, there would need to be an in-depth discussion with patient and his son regarding the benefits of dialysis given the patient's age and comorbidities.         Recommendations   - Continue to monitor renal function   - Avoid nephrotoxic medications where possible  -UA (ordered)     Recommendations were communicated to primary team in  "person and via note     Seen and discussed with Dr. Estrella Vu MD   Nephrology Teaching Service     Interval History :   Nursing and provider notes from last 24 hours reviewed.  The patient continues to feel well and denies having pain anywhere. He is eating normally and is drinking some fluids, though patient reportedly doesn't like the thickened liquids. He has also had some loose stools, though    Physical Exam:   I/O last 3 completed shifts:  In: 1804.33 [P.O.:880; I.V.:924.33]  Out: 675 [Urine:675]   /74 (BP Location: Left arm)   Pulse 59   Temp 97.9  F (36.6  C) (Oral)   Resp 20   Ht 1.854 m (6' 0.99\")   Wt 82 kg (180 lb 12.4 oz)   SpO2 94%   BMI 23.86 kg/m       GENERAL APPEARANCE:   General: Frail-appearing, enjoying breakfast   HEENT: PERRLA, EOMi, mucous membranes are moist   Pulm/Resp: Some crackles noted at the right lung base, but no increased work of breathing. No wheezing appreciated.  CV: RRR, no murmurs, rubs, gallops   Abdomen: soft, non-distended, non-tender   Ext: Moves all 4 extremities without difficulty, no pitting edema, 2+ distal pulses   Neuro: Answers questions appropriately, moving all 4 extremities without difficulty.  Skin: Warm and well perfused. Scattered seborrheic keratoses, but no other rashes or skin changes     Labs:   All labs reviewed by me  Electrolytes/Renal -   Recent Labs   Lab Test 12/23/23  0538 12/22/23  0614 12/21/23  0525 12/20/23  0529 12/17/23  1615 12/17/23  0605 12/15/23  1038 12/15/23  0851    147* 146* 142   < > 142   < > 144   POTASSIUM 4.5 4.5 4.0 3.8   < > 4.9   < > 6.1*   CHLORIDE 111* 114* 112* 110*   < > 109*   < > 111*   CO2 15* 16* 18* 16*   < > 20*   < > 24   BUN 75.0* 70.7* 65.5* 60.7*   < > 39.2*   < > 30.7*   CR 6.30* 5.63* 4.80* 3.64*   < > 1.69*   < > 1.46*   * 99 97 127*   < > 120*   < > 119*   MATT 7.7* 7.6* 7.4* 7.3*   < > 8.3   < > 7.5*   MAG  --  2.2 2.1 1.9   < > 2.1  --  2.0   PHOS 7.6*  --   --   -- "   --  3.4  --  2.5    < > = values in this interval not displayed.       CBC -   Recent Labs   Lab Test 12/20/23  0529 12/19/23  0539 12/18/23  2200   WBC 11.0 14.0* 12.1*   HGB 13.2* 13.8 12.9*   * 131* 130*       LFTs -   Recent Labs   Lab Test 12/17/23  1615 12/15/23  0851   ALKPHOS 85 99   BILITOTAL 0.7 0.6   ALT 18 20   AST 35 34   PROTTOTAL 5.9* 6.0*   ALBUMIN 3.3* 3.4*       Imaging:  US Renal Complete (12/18/23)  IMPRESSION:  1.  Left greater than right renal atrophy without hydronephrosis bilaterally.  2.  Echogenic bladder debris. This is nonspecific and can be seen with  infection or hematuria. Correlated with urinary symptoms and  urinalysis.    Per the radiologist's report which is filed     Current Medications:   apixaban ANTICOAGULANT  2.5 mg Oral BID    folic acid  1 mg Oral QAM    [Held by provider] gabapentin  300 mg Oral BID    melatonin  5 mg Oral At Bedtime    [Held by provider] metoprolol succinate ER  12.5 mg Oral BID    midodrine  2.5 mg Oral BID    mirtazapine  15 mg Oral At Bedtime    multivitamin w/minerals  1 tablet Oral QAM    primidone  25 mg Oral Daily    sodium chloride (PF)  3 mL Intracatheter Q8H    sodium chloride (PF)  3 mL Intracatheter Q8H    thiamine  100 mg Oral QAM      - MEDICATION INSTRUCTIONS -       Feroz Vu MD

## 2023-12-23 NOTE — PROVIDER NOTIFICATION
Time of notification: 8:31 PM  Provider notified:Scot Giron MD    Reason for notification:  Pts BP is 144/91. Should I hold 2000 dose of Midodrine?     Ok to hold midodrine tonight and watch for orthostatics - Scot Giron MD

## 2023-12-23 NOTE — PROGRESS NOTES
Calorie Count  Intake recorded for: 12/22  Total Kcals: 983 Total Protein: 51g  Kcals from Hospital Food: 983   Protein: 51g  Kcals from Outside Food (average):0 Protein: 0g  # Meals Ordered from Kitchen: 3 meals  # Meals Recorded: 1 - 100% 8 oz 1% milk, toast w/ butter and jelly  # Supplements Recorded: 2 - 100% 2 Ensure Enlive

## 2023-12-23 NOTE — PROGRESS NOTES
[facility]    Progress Note - Medicine Service, MAROON TEAM 4       Date of Admission:  12/15/2023    Assessment & Plan   Jc Cloud is a 89 year old male admitted on 12/15/2023. He has a history of coronary artery disease status post CABG, A-fib and AV block on Eliquis, hypertension, depression and anxiety, sleep apnea, essential tremor, mild cognitive impairment, chronic kidney disease and presented  after being found confused this morning at his assisted living facility and was found to have acute toxic metabolic encephalopathy and acute hypoxic respiratory failure likely due to pneumonia (community-acquired versus aspiration) with hospital course complicated by GER on CKD and hypernatremia.     Changes today:  -Remove Covarrubias catheter  -stop D5 water for hypernatremia  -Appreciate further nephrology recommendations  -Reduce midodrine dose to 2.5 twice daily    # Acute hypoxic respiratory failure, resolved  Possible etiology of his hypoxia on arrival includes community-acquired pneumonia or aspiration pneumonia.  While BNP and troponin are elevated, echocardiogram obtained by ED showed normal systolic function and no evidence of volume overload so I feel heart failure is less likely.  There is no evidence of volume overload on exam.  Evening of 12/17 he became more hypoxic with increased work of breathing.  It is possible he had an aspiration event either at that time or earlier in the day.  On 12/18 he was able to be weaned to low-flow nasal cannula with improvement in his work of breathing.  Still favor his hypoxia is related to community-acquired or aspiration pneumonia, with possible component of atelectasis given his prolonged hospitalization.  -Pneumonia treatment as below  -Supplemental oxygen as needed to maintain sats greater than 88%  -Monitor intake and output, daily weights  -Incentive spirometry    # Acute toxic metabolic encephalopathy,  resolved  # C/f Urinary tract infection, resolved  # Community-acquired pneumonia vs aspiration pneumonia  Patient is known to have mild cognitive impairment at baseline, though his son states that he is usually a good historian and able to take care of most of his ADLs independently.  He does not previously had admissions for confusion.  This is most likely related to his community-acquired pneumonia/aspiration pneumonia.  He may have had an aspiration event when he was found down on morning of presentation.  Unlikely to be stroke with nonfocal neurologic exam.  His electrolytes were normal, and no contributing medications.  He does have a chronic history of alcohol use with 1 drink at night.  Low suspicion that this is contributing.  Urine culture ultimately grew normal urogenital rodger.  -Follow urine and blood cultures from 12/15/2023; no growth to date  -Frequent reorientation  -PT/OT consults  -SLP cleared patient for regular diet with supervision; mildly thick liquids  -Hold potential contributing PTA meds including trazodone and gabapentin  -As needed Zyprexa available for sundowning; 2.5 mg    Antibiotics:  -Zosyn x 1 on 12/15  -ceftriaxone 2 g daily 12/15-12/17  -zosyn 12/17-12/21  -Augmentin renally dosed 12/21-12/22 to complete course     # Hypernatremia, stable  Nephrology consulted.  -D5 water 100 mL overnight at 12/19 - 12/20; may need this again today but he appears to have intact thirst mechanism  -Continue D5 water at 50 mL/h  -Encourage p.o. fluids  -A.m. BMP    # Acute kidney injury on CKD3a  # Anion Gap Metabolic Acidosis  # Lactic Acidosis,resolved  Baseline creatinine around 1.5.  Kidney function continues to worsen with rising creatinine.  Most likely feel like this is prerenal/hypovolemia related.  Had multiple hypotensive episodes during his hospitalization, around 12/17 and 12/18.  He is still making urine.  Per nursing he is eating and drinking normally.  -Consult nephrology, appreciate  recommendations  -Avoid toxic medications  -Renal ultrasound 12/18 grossly normal  -Urinalysis bland  -Trend BMP  - IVF for any poor PO intake    # Coronary artery disease status post CABG  # Hypertension  # History of orthostatic hypotension  Possible that patient's orthostatic hypotension contributed to his falls and confusion, especially in the setting of infection.  TTE was obtained with left ventricular ejection fraction of 50% and evidence of pulmonary hypertension.  -Hold PTA metoprolol 12.5 mg twice daily given GER and episodes of hypotension  -Continue midodrine 2.5 mg oral twice daily (dose reduced 12/23)     # Atrial fibrillation  # Second-degree AV block  # Status post biventricular pacemaker  Cardiac pacemaker was interrogated in the emergency department and was normal.   -Held apixaban on admission, restarted on 12/16     # Essential tremor  -Continue PTA primidone     # Depression/anxiety  -Continue PTA mirtazapine     # Thrombocytopenia  Mild.  Likely related to marrow suppression in the setting of acute illness.      Diet: Regular Diet Adult    DVT Prophylaxis: as above  Covarrubias Catheter: Not present  Fluids: none  Lines: None     Cardiac Monitoring: None  Code Status: No CPR- Pre-arrest intubation OK    Clinically Significant Risk Factors         # Hypernatremia: Highest Na = 147 mmol/L in last 2 days, will monitor as appropriate      # Hypoalbuminemia: Lowest albumin = 3.3 g/dL at 12/17/2023  4:15 PM, will monitor as appropriate      # Acute Kidney Injury, unspecified: based on a >150% or 0.3 mg/dL increase in last creatinine compared to past 90 day average, will monitor renal function  # Hypertension: Noted on problem list            # Financial/Environmental Concerns: none   # History of CABG: noted on surgical history       Disposition Plan      Expected Discharge Date: 12/26/2023      Destination: assisted living  Discharge Comments: once on room air, kidney function is improving and dispo plan  in place      The patient's care was discussed with the  attending physician .    Dar Zaragzoa MD  Medicine Service, [facility] TEAM [address] [place] [address]  Securely message with [email or web address] (more info)  Text page via [employer] Paging/Directory   See signed in provider for up to date coverage information  ______________________________________________________________________    Interval History   Awake and alert this morning.  Lying in bed watching TV.  Feels well.  No cough, no shortness of breath, no fevers.  Feels a little more fatigued than yesterday.    Physical Exam   Vital Signs: Temp: 97.2  F (36.2  C) Temp src: Oral BP: (!) 158/84 Pulse: 88   Resp: 18 SpO2: 92 % O2 Device: Nasal cannula Oxygen Delivery: 1 LPM  Weight: 180 lbs 12.44 oz    General Appearance: Elderly male, no acute distress, lying in bed watching TV  Eyes: Extraocular movements intact, clear sclera  HEENT: Moist mucous membranes  Respiratory: normal work of breathing, decreased breath sounds in the left lung base, no crackles, on 1 L of oxygen  Cardiovascular: Regular rate and rhythm, no murmurs  GI: Soft, nontender, nondistended throughout  Skin: No obvious rashes or lesions on skin  Musculoskeletal: No lower extremity edema, no gross deformities, no obvious trauma  Neurologic: Oriented to self, location, able to follow one-step commands, no cranial nerve or other focal neurologic deficits    Medical Decision Making       Please see A&P for additional details of medical decision making.      Data     I have personally reviewed the following data over the past 24 hrs:    N/A  \   N/A   / N/A     144 111 (H) 75.0 (H) /  123 (H)   4.5 15 (L) 6.30 (H) \       Imaging results reviewed over the past 24 hrs:   No results found for this or any previous visit (from the past 24 hour(s)).

## 2023-12-23 NOTE — PROGRESS NOTES
Assumed cares from [id number]    Neuro: A&Ox3-4, intermittently disoriented to place. Pt displays some forgetfulness  Cardiac: No tele orders, provider informed of elevated BP ( 153/92) otherwise VSS.  Respiratory: Sating >94% on 1L NC  GI/: Adequate urine output via lozoya and small BM x1.  Diet/appetite: Tolerating regular diet. Eating well.  Activity:  Assist of 2 up to commode  Pain: Pain denied during shift.  Skin: No new deficits noted.  LDA's: L. PIV infusing continuous D5 at 50mls/hr    Plan: Continue with POC. Notify primary team with changes.

## 2023-12-24 NOTE — PROGRESS NOTES
Nephrology Progress Note  12/24/2023         Assessment & Recommendations:   Jc Cloud is a 89 year old male admitted on 12/15/2023. He has a history of coronary artery disease status post CABG, a-fib and AV block on Eliquis, hypertension, depression and anxiety, sleep apnea, essential tremor, mild cognitive impairment, currently admitted for treatment of aspiration pneumonia and developed worsening GER in the setting of recurrent episodes of hypotension secondary of sepsis vs dehydration.     The patient had 2 episodes of hypotension on 12/17 and 12/18 where pressures were in the 70's-80's/50's. Patient was given IV fluids and responded both times, but creatinine has subsequently been increasing. Appeared to have maybe plateaued on 12/19 labs, but unfortunately continued to increase today to 5.63    At this time, the etiology of the patient's GER is like still secondary to transient ischemia/hypoperfusion in the setting of recurrent episodes of hypotension. Renal ultrasound on 12/18/23 demonstrated bilateral renal atrophy L>R without hydronephrosis bilaterally as well as bladder debris that would be consistent with the patient's UTI. Repeat UA was grossly normal. At this time, clinical suspicion is that the patient is transitioning to the ATN phase of his kidney injury. Given his history of CKD, anticipate a longer course before his creatinine begins to improve. Patient's urine output has increased from days prior, which is reassuring. He is euvolemic on exam and has no electrolyte derangements that would indicate need for dialysis. In the event that he were to require dialysis, there would need to be an in-depth discussion with patient and his son regarding the benefits of dialysis given the patient's age and comorbidities.         Recommendations   - Continue to monitor renal function   - Avoid nephrotoxic medications where possible  -we ordered bicarb tabs 1300 TID     Recommendations were communicated  "to primary team in person and via note     Seen and discussed with Dr. Estrella Vu MD   Nephrology Teaching Service     Interval History :   -we ordered bicarb tabs 1300 TID   -CR still uptrending with good UO  -primary team planning to remove lozoya   -stopped D5 water for hypernatremia     Physical Exam:   I/O last 3 completed shifts:  In: 2127 [P.O.:1726; I.V.:401]  Out: 425 [Urine:425]   BP (!) 160/91 (BP Location: Right arm)   Pulse 61   Temp 98  F (36.7  C) (Axillary)   Resp 16   Ht 1.854 m (6' 0.99\")   Wt 82.2 kg (181 lb 3.2 oz)   SpO2 92%   BMI 23.91 kg/m       GENERAL APPEARANCE:   General: Frail-appearing, enjoying breakfast   HEENT: PERRLA, EOMi, mucous membranes are moist   Pulm/Resp: Some crackles noted at the right lung base, but no increased work of breathing. No wheezing appreciated.  CV: RRR, no murmurs, rubs, gallops   Abdomen: soft, non-distended, non-tender   Ext: Moves all 4 extremities without difficulty, no pitting edema, 2+ distal pulses   Neuro: Answers questions appropriately, moving all 4 extremities without difficulty.  Skin: Warm and well perfused. Scattered seborrheic keratoses, but no other rashes or skin changes     Labs:   All labs reviewed by me  Electrolytes/Renal -   Recent Labs   Lab Test 12/24/23  0456 12/23/23  0538 12/22/23  0614 12/21/23  0525 12/17/23  1615 12/17/23  0605    144 147* 146*   < > 142   POTASSIUM 4.9 4.5 4.5 4.0   < > 4.9   CHLORIDE 111* 111* 114* 112*   < > 109*   CO2 16* 15* 16* 18*   < > 20*   BUN 80.6* 75.0* 70.7* 65.5*   < > 39.2*   CR 6.72* 6.30* 5.63* 4.80*   < > 1.69*   GLC 84 123* 99 97   < > 120*   MATT 8.0* 7.7* 7.6* 7.4*   < > 8.3   MAG 2.3  --  2.2 2.1   < > 2.1   PHOS 7.8* 7.6*  --   --   --  3.4    < > = values in this interval not displayed.       CBC -   Recent Labs   Lab Test 12/20/23  0529 12/19/23  0539 12/18/23  2200   WBC 11.0 14.0* 12.1*   HGB 13.2* 13.8 12.9*   * 131* 130*       LFTs -   Recent Labs "   Lab Test 12/17/23  1615 12/15/23  0851   ALKPHOS 85 99   BILITOTAL 0.7 0.6   ALT 18 20   AST 35 34   PROTTOTAL 5.9* 6.0*   ALBUMIN 3.3* 3.4*       Imaging:  US Renal Complete (12/18/23)  IMPRESSION:  1.  Left greater than right renal atrophy without hydronephrosis bilaterally.  2.  Echogenic bladder debris. This is nonspecific and can be seen with  infection or hematuria. Correlated with urinary symptoms and  urinalysis.    Per the radiologist's report which is filed     Current Medications:   apixaban ANTICOAGULANT  2.5 mg Oral BID    folic acid  1 mg Oral QAM    [Held by provider] gabapentin  300 mg Oral BID    melatonin  5 mg Oral At Bedtime    [Held by provider] metoprolol succinate ER  12.5 mg Oral BID    midodrine  2.5 mg Oral BID    mirtazapine  15 mg Oral At Bedtime    multivitamin w/minerals  1 tablet Oral QAM    primidone  25 mg Oral Daily    sodium bicarbonate  1,300 mg Oral TID    sodium chloride (PF)  3 mL Intracatheter Q8H    sodium chloride (PF)  3 mL Intracatheter Q8H    thiamine  100 mg Oral QAM      - MEDICATION INSTRUCTIONS -       Feroz Vu MD

## 2023-12-24 NOTE — PROGRESS NOTES
Calorie Count  Intake recorded for: 12/23  Total Kcals: 1113 Total Protein: 63g  Kcals from Hospital Food: 1113   Protein: 63g  Kcals from Outside Food (average):0 Protein: 0g  # Meals Ordered from Kitchen: 2 Meals  # Meals Recorded: 1 Meal (25% scrambled eggs, pineapple, mildly-thickened orange juice)  # Supplements Recorded: 100% of 2 ensure enlive

## 2023-12-24 NOTE — PROGRESS NOTES
Transfer to 5 C patient was alerted and asking why he is moving to new room, no pain was expressed, skin check was done with Jasmin HENNING RN most of skin was intact, L side 2nd tow was red area was noted continue to monitor

## 2023-12-24 NOTE — PLAN OF CARE
"Vitals:  Blood pressure (!) 157/92, pulse 67, temperature 98.1  F (36.7  C), temperature source Axillary, resp. rate 18, height 1.854 m (6' 0.99\"), weight 82 kg (180 lb 12.4 oz), SpO2 93%.    Neuro: A/Ox4 Denies Headache, dizziness,  Lightheadedness, numbness and tingling. Neuro checks consistent. calls appropriately   Cardiac: SR, VSS.  Denies chest pain.   Respiratory: sating >92 on 1.5 L NC. denies SOB. LS clear/diminished bilaterally.  Diet/appetite: regular Diet. Mildly thick liquids.  GI/: Up to toilet, needs reminders to use call light, bed alarm on. No BM this shift.  Activity:  Assist x1 w/ walker/gait belt  Pain: Denies  Skin: WNL, dry/flaky, scattered bruising, R toe abrasion. Generalized trace edema.     Continue to monitor pt progression and notify providers per care plan.    Problem: Adult Inpatient Plan of Care  Goal: Plan of Care Review  Description: The Plan of Care Review/Shift note should be completed every shift.  The Outcome Evaluation is a brief statement about your assessment that the patient is improving, declining, or no change.  This information will be displayed automatically on your shift  note.  Outcome: Progressing  Flowsheets (Taken 12/23/2023 2131)  Plan of Care Reviewed With: patient  Goal: Patient-Specific Goal (Individualized)  Description: You can add care plan individualizations to a care plan. Examples of Individualization might be:  \"Parent requests to be called daily at 9am for status\", \"I have a hard time hearing out of my right ear\", or \"Do not touch me to wake me up as it startles  me\".  Outcome: Progressing  Goal: Absence of Hospital-Acquired Illness or Injury  Outcome: Progressing  Intervention: Identify and Manage Fall Risk  Recent Flowsheet Documentation  Taken 12/23/2023 1947 by Carly Wick RN  Safety Promotion/Fall Prevention:   activity supervised   clutter free environment maintained   increased rounding and observation   nonskid shoes/slippers when out of bed   " room near nurse's station   toileting scheduled  Intervention: Prevent Skin Injury  Recent Flowsheet Documentation  Taken 12/23/2023 1947 by Carly Wick RN  Body Position: position changed independently  Skin Protection: adhesive use limited  Device Skin Pressure Protection:   absorbent pad utilized/changed   adhesive use limited  Intervention: Prevent and Manage VTE (Venous Thromboembolism) Risk  Recent Flowsheet Documentation  Taken 12/23/2023 1947 by Carly Wick RN  VTE Prevention/Management: SCDs (sequential compression devices) off  Intervention: Prevent Infection  Recent Flowsheet Documentation  Taken 12/23/2023 1947 by Carly Wick RN  Infection Prevention:   cohorting utilized   environmental surveillance performed   equipment surfaces disinfected   hand hygiene promoted   personal protective equipment utilized   rest/sleep promoted   single patient room provided  Goal: Optimal Comfort and Wellbeing  Outcome: Progressing  Goal: Readiness for Transition of Care  Outcome: Progressing     Problem: Restraint, Nonviolent  Goal: Absence of Harm or Injury  Outcome: Progressing  Intervention: Implement Least Restrictive Safety Strategies  Recent Flowsheet Documentation  Taken 12/23/2023 1947 by Carly Wick RN  Medical Device Protection: tubing secured  Intervention: Protect Skin and Joint Integrity  Recent Flowsheet Documentation  Taken 12/23/2023 1947 by Carly Wick RN  Body Position: position changed independently  Skin Protection: adhesive use limited

## 2023-12-24 NOTE — PROGRESS NOTES
"12/24/2023  Patient seen and examined during rounds this morning with medical student Saulo.  Patient reports no complaints.  He is resting comfortably without short(ness) of breath or increased work of breathing.  He denies any short(ness) of breath or chest pain or nausea / vomiting / diarrhea.  Changes noted from yesterday.    Examination:  BP (!) 160/91 (BP Location: Right arm)   Pulse 61   Temp 98  F (36.7  C) (Axillary)   Resp 16   Ht 1.854 m (6' 0.99\")   Wt 82.2 kg (181 lb 3.2 oz)   SpO2 92%   BMI 23.91 kg/m    Awake, alert, no acute distress    Assessment:  see below.  Primary issue is worsening acute kidney failure of unclear etiology.  Plan:  no changes to what is outlined below.  Daily labs.    Rubén Cortes MD    =====================================================================================      [facility]    Progress Note - Medicine Service, [facility] TEAM 4       Date of Admission:  12/15/2023    Assessment & Plan   Jc Cloud is a 89 year old male admitted on 12/15/2023. He has a history of coronary artery disease status post CABG, A-fib and AV block on Eliquis, hypertension, depression and anxiety, sleep apnea, essential tremor, mild cognitive impairment, chronic kidney disease and presented  after being found confused this morning at his assisted living facility and was found to have acute toxic metabolic encephalopathy and acute hypoxic respiratory failure likely due to pneumonia (community-acquired versus aspiration) with hospital course complicated by GER on CKD and hypernatremia.     Changes today:  -Remove Covarrubias catheter  -stop D5 water for hypernatremia  -Appreciate further nephrology recommendations  -Reduce midodrine dose to 2.5 twice daily    # Acute hypoxic respiratory failure, resolved  Possible etiology of his hypoxia on arrival includes community-acquired pneumonia or aspiration pneumonia.  While BNP and troponin are elevated, " echocardiogram obtained by ED showed normal systolic function and no evidence of volume overload so I feel heart failure is less likely.  There is no evidence of volume overload on exam.  Evening of 12/17 he became more hypoxic with increased work of breathing.  It is possible he had an aspiration event either at that time or earlier in the day.  On 12/18 he was able to be weaned to low-flow nasal cannula with improvement in his work of breathing.  Still favor his hypoxia is related to community-acquired or aspiration pneumonia, with possible component of atelectasis given his prolonged hospitalization.  -Pneumonia treatment as below  -Supplemental oxygen as needed to maintain sats greater than 88%  -Monitor intake and output, daily weights  -Incentive spirometry    # Acute toxic metabolic encephalopathy, resolved  # C/f Urinary tract infection, resolved  # Community-acquired pneumonia vs aspiration pneumonia  Patient is known to have mild cognitive impairment at baseline, though his son states that he is usually a good historian and able to take care of most of his ADLs independently.  He does not previously had admissions for confusion.  This is most likely related to his community-acquired pneumonia/aspiration pneumonia.  He may have had an aspiration event when he was found down on morning of presentation.  Unlikely to be stroke with nonfocal neurologic exam.  His electrolytes were normal, and no contributing medications.  He does have a chronic history of alcohol use with 1 drink at night.  Low suspicion that this is contributing.  Urine culture ultimately grew normal urogenital rodger.  -Follow urine and blood cultures from 12/15/2023; no growth to date  -Frequent reorientation  -PT/OT consults  -SLP cleared patient for regular diet with supervision; mildly thick liquids  -Hold potential contributing PTA meds including trazodone and gabapentin  -As needed Zyprexa available for sundowning; 2.5  mg    Antibiotics:  -Zosyn x 1 on 12/15  -ceftriaxone 2 g daily 12/15-12/17  -zosyn 12/17-12/21  -Augmentin renally dosed 12/21-12/22 to complete course     # Hypernatremia, stable  Nephrology consulted.  -D5 water 100 mL overnight at 12/19 - 12/20; may need this again today but he appears to have intact thirst mechanism  -Continue D5 water at 50 mL/h  -Encourage p.o. fluids  -A.m. BMP    # Acute kidney injury on CKD3a  # Anion Gap Metabolic Acidosis  # Lactic Acidosis,resolved  Baseline creatinine around 1.5.  Kidney function continues to worsen with rising creatinine.  Most likely feel like this is prerenal/hypovolemia related.  Had multiple hypotensive episodes during his hospitalization, around 12/17 and 12/18.  He is still making urine.  Per nursing he is eating and drinking normally.  -Consult nephrology, appreciate recommendations  -Avoid toxic medications  -Renal ultrasound 12/18 grossly normal  -Urinalysis bland  -Trend BMP  - IVF for any poor PO intake    # Coronary artery disease status post CABG  # Hypertension  # History of orthostatic hypotension  Possible that patient's orthostatic hypotension contributed to his falls and confusion, especially in the setting of infection.  TTE was obtained with left ventricular ejection fraction of 50% and evidence of pulmonary hypertension.  -Hold PTA metoprolol 12.5 mg twice daily given GER and episodes of hypotension  -Continue midodrine 2.5 mg oral twice daily (dose reduced 12/23)     # Atrial fibrillation  # Second-degree AV block  # Status post biventricular pacemaker  Cardiac pacemaker was interrogated in the emergency department and was normal.   -Held apixaban on admission, restarted on 12/16     # Essential tremor  -Continue PTA primidone     # Depression/anxiety  -Continue PTA mirtazapine     # Thrombocytopenia  Mild.  Likely related to marrow suppression in the setting of acute illness.      Diet: Regular Diet Adult    DVT Prophylaxis: as above  Satish  Catheter: Not present  Fluids: none  Lines: None     Cardiac Monitoring: None  Code Status: No CPR- Pre-arrest intubation OK    Clinically Significant Risk Factors              # Hypoalbuminemia: Lowest albumin = 3.3 g/dL at 12/17/2023  4:15 PM, will monitor as appropriate      # Acute Kidney Injury, unspecified: based on a >150% or 0.3 mg/dL increase in last creatinine compared to past 90 day average, will monitor renal function  # Hypertension: Noted on problem list            # Financial/Environmental Concerns: none   # History of CABG: noted on surgical history       Disposition Plan     Expected Discharge Date: 12/26/2023      Destination: assisted living  Discharge Comments: once on room air, kidney function is improving and dispo plan in place      The patient's care was discussed with the  attending physician .    Mike Cortes MD  Medicine Service, [address]  Securely message with Vocera (more info)  Text page via [employer] Paging/Directory   See signed in provider for up to date coverage information  ______________________________________________________________________    Interval History   Awake and alert this morning.  Lying in bed watching TV.  Feels well.  No cough, no shortness of breath, no fevers.  Feels a little more fatigued than yesterday.    Physical Exam   Vital Signs: Temp: 98  F (36.7  C) Temp src: Axillary BP: (!) 160/91 Pulse: 61   Resp: 16 SpO2: 92 % O2 Device: Nasal cannula Oxygen Delivery: 1.5 LPM  Weight: 181 lbs 3.2 oz    General Appearance: Elderly male, no acute distress, lying in bed watching TV  Eyes: Extraocular movements intact, clear sclera  HEENT: Moist mucous membranes  Respiratory: normal work of breathing, decreased breath sounds in the left lung base, no crackles, on 1 L of oxygen  Cardiovascular: Regular rate and rhythm, no murmurs  GI: Soft, nontender, nondistended throughout  Skin: No obvious rashes or  lesions on skin  Musculoskeletal: No lower extremity edema, no gross deformities, no obvious trauma  Neurologic: Oriented to self, location, able to follow one-step commands, no cranial nerve or other focal neurologic deficits    Medical Decision Making       Please see A&P for additional details of medical decision making.      Data     I have personally reviewed the following data over the past 24 hrs:    N/A  \   N/A   / N/A     144 111 (H) 80.6 (H) /  84   4.9 16 (L) 6.72 (H) \       Imaging results reviewed over the past 24 hrs:   No results found for this or any previous visit (from the past 24 hour(s)).

## 2023-12-24 NOTE — PLAN OF CARE
Transfer  Transferred to:5C  Via:bed  Reason for transfer:Pt no longer appropriate for 6B- improved patient condition  Family: Aware of transfer  Pt son Kan called by 6B RN Jose CAMP and updated with info.  Belongings: Packed and sent with pt  Chart: Delivered with pt to next unit  Medications: Meds sent to new unit with pt  Report given to:5C RN  Pt status: stable

## 2023-12-24 NOTE — ASSESSMENT & PLAN NOTE
Acute toxic metabolic encephalopathy and acute hypoxic respiratory failure likely due to pneumonia (community-acquired versus aspiration) with hospital course complicated by GER on CKD and hypernatremia.

## 2023-12-25 NOTE — PROGRESS NOTES
Nephrology Progress Note  12/25/2023         Assessment & Recommendations:   Jc Cloud is a 89 year old male admitted on 12/15/2023. He has a history of coronary artery disease status post CABG, a-fib and AV block on Eliquis, hypertension, depression and anxiety, sleep apnea, essential tremor, mild cognitive impairment, currently admitted for treatment of aspiration pneumonia and developed worsening GER in the setting of recurrent episodes of hypotension secondary of sepsis vs dehydration.     The patient had 2 episodes of hypotension on 12/17 and 12/18 where pressures were in the 70's-80's/50's. Patient was given IV fluids and responded both times, but creatinine has subsequently been increasing. Appeared to have maybe plateaued on 12/19 labs, but unfortunately continued to increase today to 5.63    At this time, the etiology of the patient's GER is likely secondary to transient ischemia/hypoperfusion in the setting of recurrent episodes of hypotension. Renal ultrasound on 12/18/23 demonstrated bilateral renal atrophy L>R without hydronephrosis bilaterally as well as bladder debris that would be consistent with the patient's UTI. Repeat UA was grossly normal. At this time, clinical suspicion is that the patient is transitioning to the ATN phase of his kidney injury. Given his history of CKD, anticipate a longer course before his creatinine begins to improve. Patient's urine output has increased from days prior, which is reassuring. He is euvolemic on exam and has no electrolyte derangements that would indicate need for dialysis. In the event that he were to require dialysis, there would need to be an in-depth discussion with patient and his son regarding the benefits of dialysis given the patient's age and comorbidities.         Recommendations   - Continue to monitor renal function   - Avoid nephrotoxic medications where possible  - Continue bicarb 1300 TID     Recommendations were communicated to primary  "team via note     Seen and discussed with Dr. Estrella Oneil MD   Nephrology Teaching Service     Interval History :   Appears to have had a few voids since lozoya removal. No specific concerns this morning. Creatinine is stable today. He is oriented to time and place but otherwise limited in questioning.     Physical Exam:   I/O last 3 completed shifts:  In: 470 [P.O.:470]  Out: -    BP (!) 145/86 (BP Location: Right arm)   Pulse 65   Temp 97.9  F (36.6  C) (Axillary)   Resp 16   Ht 1.854 m (6' 0.99\")   Wt 82.2 kg (181 lb 3.2 oz)   SpO2 93%   BMI 23.91 kg/m       GENERAL APPEARANCE:   General: Frail-appearing, lying in bed  HEENT: PERRLA, EOMi, mucous membranes are moist   Pulm/Resp: Some crackles noted at the right lung base, but no increased work of breathing. No wheezing appreciated.  CV: RRR, no murmurs, rubs, gallops   Abdomen: soft, non-distended, non-tender   Ext: Moves all 4 extremities without difficulty, no pitting edema, 2+ distal pulses   Neuro: Answers questions appropriately, moving all 4 extremities without difficulty.  Skin: Warm and well perfused. Scattered seborrheic keratoses, but no other rashes or skin changes     Labs:   All labs reviewed by me  Electrolytes/Renal -   Recent Labs   Lab Test 12/25/23  0453 12/24/23  0456 12/23/23  0538 12/22/23  0614   * 144 144 147*   POTASSIUM 4.3 4.9 4.5 4.5   CHLORIDE 112* 111* 111* 114*   CO2 17* 16* 15* 16*   BUN 85.5* 80.6* 75.0* 70.7*   CR 6.78* 6.72* 6.30* 5.63*   * 84 123* 99   MATT 8.1* 8.0* 7.7* 7.6*   MAG 2.3 2.3  --  2.2   PHOS 7.5* 7.8* 7.6*  --        CBC -   Recent Labs   Lab Test 12/20/23  0529 12/19/23  0539 12/18/23  2200   WBC 11.0 14.0* 12.1*   HGB 13.2* 13.8 12.9*   * 131* 130*       LFTs -   Recent Labs   Lab Test 12/17/23  1615 12/15/23  0851   ALKPHOS 85 99   BILITOTAL 0.7 0.6   ALT 18 20   AST 35 34   PROTTOTAL 5.9* 6.0*   ALBUMIN 3.3* 3.4*       Imaging:  US Renal Complete " (12/18/23)  IMPRESSION:  1.  Left greater than right renal atrophy without hydronephrosis bilaterally.  2.  Echogenic bladder debris. This is nonspecific and can be seen with  infection or hematuria. Correlated with urinary symptoms and  urinalysis.    Per the radiologist's report which is filed     Current Medications:   apixaban ANTICOAGULANT  2.5 mg Oral BID    folic acid  1 mg Oral QAM    [Held by provider] gabapentin  300 mg Oral BID    melatonin  5 mg Oral At Bedtime    [Held by provider] metoprolol succinate ER  12.5 mg Oral BID    midodrine  2.5 mg Oral BID    mirtazapine  15 mg Oral At Bedtime    multivitamin w/minerals  1 tablet Oral QAM    primidone  25 mg Oral Daily    sodium bicarbonate  1,300 mg Oral TID    sodium chloride (PF)  3 mL Intracatheter Q8H    sodium chloride (PF)  3 mL Intracatheter Q8H    thiamine  100 mg Oral QAM      - MEDICATION INSTRUCTIONS -       Spencer Oneil MD

## 2023-12-25 NOTE — PROGRESS NOTES
Care Management Follow Up    Length of Stay (days): 10    Expected Discharge Date: 12/26/2023     Concerns to be Addressed:       Patient plan of care discussed at interdisciplinary rounds: No    Anticipated Discharge Disposition:  TCU     Anticipated Discharge Services:  TBD  Anticipated Discharge DME:  TBD    Patient/family educated on Medicare website which has current facility and service quality ratings:  yes  Education Provided on the Discharge Plan:  yes  Patient/Family in Agreement with the Plan:    yes  Referrals Placed by CM/SW:  see below  Private pay costs discussed: Not applicable    Sent referrals on 12/25 via destination in [facility]:  [facility]  [address] [place] [address]  [address]  P: [phone]  F: [phone]      [facility]  [address], [place]  [address]  P: [phone]  P: [phone] - Admissions  F: [phone]      [place]  [address] [facility] [address]  Admissions: [phone]   [place]/e Admissions: [phone]  F: [phone]      [facility]  [address] [place]  [address]  P: [phone]  F: [phone]      [facility]  [address] [place], [place] [address] [phone]     [facility]   [address] [place] [address]  [phone]     [address] [id number] [phone]     [facility] [address] [place], [place] [id number] [phone]     [facility] [address] [place] [address] [phone]      Additional Information:  DANK maria messaged resident, Dagohodaalexkemal, asking for CONNER to put in referrals. DANK did not receive a response.  DANK reviewed notes and sent referrals to the above TCU's    SW to follow and assist with any other discharge needs that may arise.  [facility]   5A beds:5220-40  5C beds 5417-32 (no BMT pt's)   Social  Worker  Phone: [phone]  Pager: [phone]

## 2023-12-25 NOTE — PROGRESS NOTES
[facility]    Progress Note - Medicine Service, MAROON TEAM 4       Date of Admission:  12/15/2023    Assessment & Plan   Jc Cloud is a 89 year old male admitted on 12/15/2023. He has a history of coronary artery disease status post CABG, A-fib and AV block on Eliquis, hypertension, depression and anxiety, sleep apnea, essential tremor, mild cognitive impairment, chronic kidney disease and presented  after being found confused this morning at his assisted living facility and was found to have acute toxic metabolic encephalopathy and acute hypoxic respiratory failure likely due to pneumonia (community-acquired versus aspiration) with hospital course complicated by GER on CKD and hypernatremia.     Changes today:  -kidney numbers stable  -continues to make urine, no acute indications dialysis  - await evidence of kidney function improvement prior to discharge    # Acute hypoxic respiratory failure, resolved  Possible etiology of his hypoxia on arrival includes community-acquired pneumonia or aspiration pneumonia.  While BNP and troponin are elevated, echocardiogram obtained by ED showed normal systolic function and no evidence of volume overload so I feel heart failure is less likely.  There is no evidence of volume overload on exam.  Evening of 12/17 he became more hypoxic with increased work of breathing.  It is possible he had an aspiration event either at that time or earlier in the day.  On 12/18 he was able to be weaned to low-flow nasal cannula with improvement in his work of breathing.  Still favor his hypoxia is related to community-acquired or aspiration pneumonia, with possible component of atelectasis given his prolonged hospitalization.  -Pneumonia treatment as below  -Supplemental oxygen as needed to maintain sats greater than 88%  -Monitor intake and output, daily weights  -Incentive spirometry    # Acute toxic metabolic encephalopathy, resolved  #  C/f Urinary tract infection, resolved  # Community-acquired pneumonia vs aspiration pneumonia  Patient is known to have mild cognitive impairment at baseline, though his son states that he is usually a good historian and able to take care of most of his ADLs independently.  He does not previously had admissions for confusion.  This is most likely related to his community-acquired pneumonia/aspiration pneumonia.  He may have had an aspiration event when he was found down on morning of presentation.  Unlikely to be stroke with nonfocal neurologic exam.  His electrolytes were normal, and no contributing medications.  He does have a chronic history of alcohol use with 1 drink at night.  Low suspicion that this is contributing.  Urine culture ultimately grew normal urogenital rodger.  -Follow urine and blood cultures from 12/15/2023; no growth to date  -Frequent reorientation  -PT/OT consults  -SLP cleared patient for regular diet with supervision; mildly thick liquids  -Hold potential contributing PTA meds including trazodone and gabapentin  -As needed Zyprexa available for sundowning; 2.5 mg    Antibiotics:  -Zosyn x 1 on 12/15  -ceftriaxone 2 g daily 12/15-12/17  -zosyn 12/17-12/21  -Augmentin renally dosed 12/21-12/22 to complete course     # Hypernatremia, stable  Nephrology consulted. D5 water infusion was used twice to supplement free water intake.   -Encourage p.o. fluids  -A.m. BMP    # Acute kidney injury on CKD3a  # Anion Gap Metabolic Acidosis  # Lactic Acidosis,resolved  Baseline creatinine around 1.5.  Kidney function continues to worsen with rising creatinine.  Most likely feel like this is prerenal/hypovolemia related.  Had multiple hypotensive episodes during his hospitalization, around 12/17 and 12/18.  He is still making urine.  Per nursing he is eating and drinking normally.  -Consult nephrology, appreciate recommendations  -Avoid toxic medications  -Renal ultrasound 12/18 grossly normal  -Urinalysis  bland  -Trend BMP  - IVF for any poor PO intake    # Coronary artery disease status post CABG  # Hypertension  # History of orthostatic hypotension  Possible that patient's orthostatic hypotension contributed to his falls and confusion, especially in the setting of infection.  TTE was obtained with left ventricular ejection fraction of 50% and evidence of pulmonary hypertension.  -Hold PTA metoprolol 12.5 mg twice daily given GER and episodes of hypotension  -Continue midodrine 2.5 mg oral twice daily (dose reduced 12/23)     # Atrial fibrillation  # Second-degree AV block  # Status post biventricular pacemaker  Cardiac pacemaker was interrogated in the emergency department and was normal.   -Held apixaban on admission, restarted on 12/16     # Essential tremor  -Continue PTA primidone     # Depression/anxiety  -Continue PTA mirtazapine     # Thrombocytopenia  Mild.  Likely related to marrow suppression in the setting of acute illness.      Diet: Regular Diet Adult    DVT Prophylaxis: as above  Covarrubias Catheter: Not present  Fluids: none  Lines: None     Cardiac Monitoring: None  Code Status: No CPR- Pre-arrest intubation OK    Clinically Significant Risk Factors         # Hypernatremia: Highest Na = 146 mmol/L in last 2 days, will monitor as appropriate      # Hypoalbuminemia: Lowest albumin = 3.3 g/dL at 12/17/2023  4:15 PM, will monitor as appropriate      # Acute Kidney Injury, unspecified: based on a >150% or 0.3 mg/dL increase in last creatinine compared to past 90 day average, will monitor renal function  # Hypertension: Noted on problem list            # Financial/Environmental Concerns: none   # History of CABG: noted on surgical history       Disposition Plan     Expected Discharge Date: 12/26/2023      Destination: assisted living  Discharge Comments: once on room air, kidney function is improving and dispo plan in place      The patient's care was discussed with the  attending physician .    Dar Zaragoza,  "MD  Medicine Service, BETHANY TEAM 4  [facility]  Securely message with [email or web address] (more info)  Text page via [employer] Paging/Directory   See signed in provider for up to date coverage information  ______________________________________________________________________    Interval History   Awake and alert this morning.  Lying in bed. Just had episode or urinary incontinence. Denies any pain, difficulty breathing. \"I could eat something\".      Physical Exam   Vital Signs: Temp: (P) 97.7  F (36.5  C) Temp src: (P) Oral BP: (!) 178/103 Pulse: 62   Resp: 14 SpO2: 97 % O2 Device: Nasal cannula Oxygen Delivery: 3 LPM  Weight: 181 lbs 3.2 oz    General Appearance: Elderly male, no acute distress, lying in bed   Eyes: Extraocular movements intact, clear sclera  HEENT: Moist mucous membranes  Respiratory: normal work of breathing, lung sounds coarse  Cardiovascular: Regular rate and rhythm, no murmurs  GI: Soft, mildly tender, non distended  Skin: No obvious rashes or lesions on skin  Musculoskeletal: No lower extremity edema, no gross deformities, no obvious trauma  Neurologic: Oriented, no focal deficits    Medical Decision Making       Please see A&P for additional details of medical decision making.      Data     I have personally reviewed the following data over the past 24 hrs:    N/A  \   N/A   / N/A     146 (H) 112 (H) 85.5 (H) /  105 (H)   4.3 17 (L) 6.78 (H) \       Imaging results reviewed over the past 24 hrs:   No results found for this or any previous visit (from the past 24 hour(s)).    "

## 2023-12-25 NOTE — PLAN OF CARE
Hours of care: [id number]    Neuro: A&Ox4.   Cardiac: Afebrile, VSS.   Respiratory: RA, lung sounds have fine crackles in the bases, denies SOB at rest  GI/: Voiding spontaneously, incontinent of urine. No BM this shift.  Diet/appetite: Tolerating regular diet . Denies nausea.  Activity: Up with SBA, GB and walker  Pain: Denies   Skin: No new deficits noted.  Lines: 2x L PIV, SL  Drains: none  Replacements: none given    Plan: Continue with current POC. Report changes to primary team.        Goal Outcome Evaluation:      Plan of Care Reviewed With: patient    Overall Patient Progress: no changeOverall Patient Progress: no change

## 2023-12-25 NOTE — PLAN OF CARE
Patient vital signs stable. Patient oriented and appropriate. His son was at bedside most of the morning. He does not have much of an appetite and has been encouraged to eat and drink more. He has been incontinent of urine when getting up to go to the bathroom. Rash noted in the groin area, abdomen and back. Patient off oxygen today. Continue to monitor.  Problem: Adult Inpatient Plan of Care  Goal: Plan of Care Review  Description: The Plan of Care Review/Shift note should be completed every shift.  The Outcome Evaluation is a brief statement about your assessment that the patient is improving, declining, or no change.  This information will be displayed automatically on your shift  note.  Outcome: Progressing   Goal Outcome Evaluation:

## 2023-12-25 NOTE — PLAN OF CARE
"Goal Outcome Evaluation:      Assumed cares from 19:00 to 23:00    Pt was afebrile but hypertensive, A/O x 4. [place] but can make needs known. C/o back pain and requested Tylenol for it with good relief. Pt ate ice cream and cookie for supper. Left PIV saline locked. He had his scheduled Melatonin and Remeron at hs. No further complaints. Continue with plan of care.      Problem: Adult Inpatient Plan of Care  Goal: Patient-Specific Goal (Individualized)  Description: You can add care plan individualizations to a care plan. Examples of Individualization might be:  \"Parent requests to be called daily at 9am for status\", \"I have a hard time hearing out of my right ear\", or \"Do not touch me to wake me up as it startles  me\".  Outcome: Progressing     Problem: Adult Inpatient Plan of Care  Goal: Optimal Comfort and Wellbeing  Outcome: Progressing     Problem: Adult Inpatient Plan of Care  Goal: Absence of Hospital-Acquired Illness or Injury  Outcome: Progressing     Problem: Restraint, Nonviolent  Goal: Absence of Harm or Injury  Outcome: Progressing                     "

## 2023-12-26 NOTE — CONSULTS
[facility] Inpatient Consult Dermatology Note    Impression/Plan:  Erythematous blanchable papules coalescing into symmetric plaques affecting the inguinal folds, abdomen, chest, and back  Patient is a 89 yo male admitted 12/15/23 for acute toxic metabolic encephalopathy and acute hypoxic respiratory failure likely due to pneumonia (community-acquired versus aspiration) with hospital course complicated by GER on CKD and hypernatremia.     Dermatology was consulted for progressive rash. He has been on several antibiotics as noted below with onset of rash approximately 2-3 days ago per son and review of nursing notes. Differential at this time includes simple drug exanthem vs viral exanthem vs less likely DIHS/DRESS. At this time, we favor a simple drug exanthem, cannot say for certain culprit medication which would require outpatient allergy testing. Fortunately, remains off of antibiotics at this time. He is otherwise stable without recent fevers. No mucosal involvement on exam. Additionally, no drugs have been initiated over typical time frame required for DIHS/DRESS (usually 2 weeks or greater) and he is without other clinical stigmata of DIHS/DRESS.     We discussed the role of biopsy with the son over the phone and patient today. Unfortunately, biopsy between the considered diagnoses can show similar findings requiring clinical correlation. We opted for close clinical monitoring at this time with future consideration of biopsy if rash worsens or other concerning symptomology develops.     Of note, he is being followed by Nephrology for management of GER, suspected ATN. Nephrology did consider possible drug reaction to Augmentin and development of AIN (which some could consider a renal manifestation of DIHS/DRESS) with pending UA and urine protein studies tomorrow morning.     Recommendations  - Trend CBC with differential and CMP daily  - Start triamcinolone 0.1% ointment twice daily to rash  "SCHEDULED, please order 454g jar and keep at bedside      Background  Exanthematous drug eruption (IRMA; also known as morbilliform drug eruption) is the most common of all medication-induced drug rashes. It consists of red macules and papules that often arise on the trunk and spread symmetrically to involve the proximal extremities. In severe cases, lesions coalesce and may lead to erythroderma. Palms, soles, and mucous membranes may also be involved. Pruritus is a common complaint. While most patients are afebrile, a low-grade fever may occur in more severe reactions. Onset is usually between 4 and 14 days after initiating a medication. The time to eruption may be shorter if the patient had previously been sensitized to the triggering medication. The eruption may occur even if the offending medication has already been discontinued.    Discontinuance of the medication is critical for management. When a culprit drug is considered essential for the patient and there are no suitable alternatives, mild or moderate exanthematous eruptions can be \"treated through\" with topical steroids and antihistamines to alleviate itch while the suspected medication is continued.      Thank you for the dermatology consultation. Please do not hesitate to contact the dermatology resident/faculty on call for any additional questions or concerns. We will continue to follow.     Staff: Dr. Judd Solares MD  Dermatology Resident      Dermatology Problem List:  Suspected drug exanthem    Date of Admission: Dec 15, 2023   Encounter Date: 12/26/2023    Reason for Consultation:   mgmt of developing full body rash; wonder if drug rash ?recent augmentin vs heat rash vs other     History of Present Illness:  Jc Cloud is a 89 year old male admitted on 12/15/2023. He has a history of coronary artery disease status post CABG, A-fib and AV block on Eliquis, hypertension, depression and anxiety, sleep apnea, essential tremor, mild " cognitive impairment, chronic kidney disease and presented  after being found confused this morning at his assisted living facility and was found to have acute toxic metabolic encephalopathy and acute hypoxic respiratory failure likely due to pneumonia (community-acquired versus aspiration) with hospital course complicated by GER on CKD and hypernatremia. Dermatology was consulted for above.    - Son noted rash a few days ago when nurses were repositioning him  - Son thinks that the rash has otherwise been stable, not progressing   - Patient denies any recent fevers, does not think face appears swollen or has any lymph node swelling  - Son is concerned that skin rash may be causing some discomfort, especially when gown is moved over skin  - Patient denies any skin pain at this time  - Denies any lesion in the eyes or mouth  - Son denies that patient has had a drug rash in the past, patient cannot recall ever reacting to antibiotics in the past before      Antibiotics given this hospitalization  Augmentin 12/21-12/22  Azithromycin 12/16-12/18  Ceftriaxone 12/15-12/17  Zosyn 12/15-12/21  Vancomycin 12/17    Past Medical History:   Patient Active Problem List   Diagnosis    Oxygen desaturation    Episode of recurrent major depressive disorder, unspecified depression episode severity (H24)    Atrial fibrillation (H)    Coronary artery disease    Pacemaker    Hyperlipidemia    Neuropathy    GLORIA (obstructive sleep apnea)    Alcohol abuse    Recurrent major depressive disorder, in remission (H24)    Anxiety    Cognitive impairment    Closed fracture of multiple ribs of left side with routine healing    Falls frequently    Primary hypertension    Orthostatic hypotension    Primary osteoarthritis involving multiple joints    At high risk for falls    Chronic anticoagulation    Diverticulosis of large intestine    Elevated troponin    History of rhabdomyolysis    History of stroke    Hypertrophy of nasal turbinates     Idiopathic peripheral neuropathy    Insomnia    Intestinal or peritoneal adhesions with obstruction (postoperative) (postinfection) (H)    Iron overload    Malignant neoplasm of skin of trunk    Medication monitoring encounter    Muscle cramps    Osteoarthrosis    Panic attack    Pulmonary nodule    PVC's (premature ventricular contractions)    Radiculopathy of lumbar region    S/P CABG (coronary artery bypass graft)    Second degree AV block, Mobitz type I    Status post biventricular pacemaker    Tremor, essential    Stage 3a chronic kidney disease (H)    Cardiomyopathy, unspecified type (H)    Tremor    Hypoxia    Acute lower UTI    Anticoagulated    Fall, initial encounter    History of dementia    Altered mental status, unspecified altered mental status type    Aspiration pneumonia of right lower lobe, unspecified aspiration pneumonia type (H)    Acute kidney failure, unspecified (H)     Past Medical History:   Diagnosis Date    Alcohol abuse     Anxiety     Atrial fibrillation (H)     Closed fracture of multiple ribs of left side with routine healing     Cognitive impairment     Colon, diverticulosis     Coronary artery disease     Falls frequently     Hyperlipidemia     Intestinal adhesions with obstruction (H)     Neuropathy     Orthostatic hypotension     GLORIA (obstructive sleep apnea)     Pacemaker     Primary hypertension     Primary osteoarthritis involving multiple joints     Recurrent major depressive disorder, in remission (H24)     Sleep apnea      Past Surgical History:   Procedure Laterality Date    ADENOIDECTOMY      BYPASS GRAFT ARTERY CORONARY      CATARACT IOL, RT/LT      HERNIA REPAIR      IR THORACENTESIS  4/9/2014    OPEN REDUCTION INTERNAL FIXATION WRIST Right 1/3/2020    Procedure: OPEN REDUCTION INTERNAL FIXATION RIGHT DISTAL RADIUS FRACTURE;  Surgeon: Susan Mcdonald MD;  Location:  OR         Social History:  Patient reports that he quit smoking about 42 years ago. His  smoking use included cigarettes and cigars. He started smoking about 72 years ago. He has a 60 pack-year smoking history. He has never used smokeless tobacco. He reports current alcohol use. He reports that he does not use drugs.    Family History:  No family history on file.    Medications:  Current Facility-Administered Medications   Medication    acetaminophen (TYLENOL) tablet 650 mg    Or    acetaminophen (TYLENOL) Suppository 650 mg    albuterol (PROVENTIL) neb solution 2.5 mg    apixaban ANTICOAGULANT (ELIQUIS) tablet 2.5 mg    calcium carbonate (TUMS) chewable tablet 1,000 mg    dextrose 5% infusion    diphenhydrAMINE-zinc acetate (BENADRYL) 1-0.1 % cream    [Held by provider] gabapentin (NEURONTIN) capsule 300 mg    lidocaine (LMX4) cream    lidocaine (LMX4) cream    lidocaine 1 % 0.1-1 mL    lidocaine 1 % 0.1-1 mL    melatonin tablet 5 mg    [Held by provider] metoprolol succinate ER (TOPROL-XL) 24 hr half-tab 12.5 mg    midodrine (PROAMATINE) tablet 2.5 mg    mineral oil-hydrophilic petrolatum (AQUAPHOR)    mirtazapine (REMERON) tablet TABS 15 mg    multivitamin w/minerals (THERA-VIT-M) tablet 1 tablet    OLANZapine (zyPREXA) injection 2.5 mg    Or    OLANZapine (zyPREXA) tablet 2.5 mg    Patient is already receiving anticoagulation with heparin, enoxaparin (LOVENOX), warfarin (COUMADIN)  or other anticoagulant medication    polyethylene glycol (MIRALAX) Packet 17 g    primidone (MYSOLINE) half-tab 25 mg    senna-docusate (SENOKOT-S/PERICOLACE) 8.6-50 MG per tablet 1 tablet    Or    senna-docusate (SENOKOT-S/PERICOLACE) 8.6-50 MG per tablet 2 tablet    sodium bicarbonate tablet 1,300 mg    sodium chloride (PF) 0.9% PF flush 3 mL    sodium chloride (PF) 0.9% PF flush 3 mL    sodium chloride (PF) 0.9% PF flush 3 mL    sodium chloride (PF) 0.9% PF flush 3 mL          Allergies   Allergen Reactions    Atenolol     Lisinopril     Meperidine     Metoprolol     Quetiapine     Triamterene          Review of  "Systems:  -As per HPI      Physical exam:  Vitals: BP (!) 155/90 (BP Location: Right arm)   Pulse 61   Temp 97  F (36.1  C) (Axillary)   Resp 16   Ht 1.854 m (6' 0.99\")   Wt 82.2 kg (181 lb 3.2 oz)   SpO2 92%   BMI 23.91 kg/m    GEN: Lying in bed, in no acute distress. Non-toxic appearing.   SKIN: Total skin excluding the undergarment areas was performed. The exam included the head/face, neck, both arms, chest, back, abdomen, both legs, digits and/or nails.   -Funez skin type: I  - Erythematous blanchable papules coalescing into symmetric plaques affecting the inguinal folds, abdomen, chest, and back  - No facial plethora noted, no cervical or axillary lymphadenopathy appreciated  - No ocular, mucosal, or genital lesions noted  -No other lesions of concern on areas examined.     Laboratory:  Results for orders placed or performed during the hospital encounter of 12/15/23 (from the past 24 hour(s))   XR Chest Port 1 View    Narrative    EXAM: XR CHEST PORT 1 VIEW  12/25/2023 4:25 PM     HISTORY:  hx admission asp pna, now with increasing tachypnea       COMPARISON:  12/18/2023    FINDINGS:   AP portable semiupright radiograph of the chest. Left chest wall  cardiac device with intact distal leads. Postsurgical changes of the  chest with multilevel sternotomy wires. Trachea is midline.  Cardiomediastinal silhouette and pulmonary vasculature are within  normal limits. Improved aeration involving the right lower lobe.  Stable retrocardiac pulmonary opacities. Unchanged small left and  trace right pleural effusions. No appreciable pneumothorax.    No acute osseous abnormality. Visualized upper abdomen is  unremarkable.        Impression    IMPRESSION:  1. Decreased right basilar opacities compared to radiograph from  12/18/2023.  2. Stable retrocardiac pulmonary opacities, may represent atelectasis,  infection, or aspiration. Recommend follow up to clearing.  3. Stable small left and trace right pleural " effusions.    I have personally reviewed the examination and initial interpretation  and I agree with the findings.    LISETTE GRIMES MD         SYSTEM ID:  [id number]   Basic metabolic panel   Result Value Ref Range    Sodium 147 (H) 135 - 145 mmol/L    Potassium 4.7 3.4 - 5.3 mmol/L    Chloride 110 (H) 98 - 107 mmol/L    Carbon Dioxide (CO2) 20 (L) 22 - 29 mmol/L    Anion Gap 17 (H) 7 - 15 mmol/L    Urea Nitrogen 87.7 (H) 8.0 - 23.0 mg/dL    Creatinine 6.66 (H) 0.67 - 1.17 mg/dL    GFR Estimate 7 (L) >60 mL/min/1.73m2    Calcium 8.2 8.2 - 9.6 mg/dL    Glucose 104 (H) 70 - 99 mg/dL   Phosphorus   Result Value Ref Range    Phosphorus 7.4 (H) 2.5 - 4.5 mg/dL   Magnesium   Result Value Ref Range    Magnesium 2.3 1.7 - 2.3 mg/dL       Dr. Whaley staffed the patient.    Staff Involved:  Resident/Staff

## 2023-12-26 NOTE — PLAN OF CARE
Patient more tired today and unwilling to sit up in the chair today. He was incontinent of a large amount of urine this morning and stool. A external catheter was placed. Patient is oriented, appropriate, follows commands and can make his needs be known. Blood pressure high, patient tachypneic at times SpO2 mid 90's on room air, he denies SOB.  He has a rash on his back chest and in his star area this is unchanged from yesterday, he is not bothered with this rash, photographs taken and placed in his chart.  Appetite has been poor he ate 25% of his breakfast, he ate one whole donut that his son brought in and he had about 50% of  frozen magic cup. Continue to monitor.  Problem: Adult Inpatient Plan of Care  Goal: Plan of Care Review  Description: The Plan of Care Review/Shift note should be completed every shift.  The Outcome Evaluation is a brief statement about your assessment that the patient is improving, declining, or no change.  This information will be displayed automatically on your shift  note.  Outcome: Not Progressing   Goal Outcome Evaluation:

## 2023-12-26 NOTE — PLAN OF CARE
Goal Outcome Evaluation:      Plan of Care Reviewed With: patient, family    Overall Patient Progress: no changeOverall Patient Progress: no change    Outcome Evaluation: See most recent RD note 12/26 for assessment/recs

## 2023-12-26 NOTE — PROGRESS NOTES
Care Management Follow Up    Length of Stay (days): 11    Expected Discharge Date: 12/26/2023     Concerns to be Addressed:     Discharge planning  Patient plan of care discussed at interdisciplinary rounds: Yes    Anticipated Discharge Disposition:  TCU     Anticipated Discharge Services:  TBD  Anticipated Discharge DME:  TBD    Patient/family educated on Medicare website which has current facility and service quality ratings:  Yes  Education Provided on the Discharge Plan:  yes  Patient/Family in Agreement with the Plan:  yes    Referrals Placed by CM/SW:    Private pay costs discussed: Not applicable    ACCEPTED:    Addendum:   PT's son would like pt to go to   [facility]  [address] [facility] [address]  Admissions: [phone]   [place] Admissions: [phone]  F: [phone]   Call back to see if they have abed open when pt is ready for discharge    [facility]  [address] [place] [address]  [address]  P: [phone]  P: [phone] - Admissions  F: [phone]    Call back to see if they have abed open when pt is ready for discharge    [facility]   [address] [place] [address]  [phone]   [facility] [address] [place] [address]  [phone]  Should have a bed available on 12/28/29    Sent referrals on 12/25 via destination in [facility]:  [facility]  [address] [place] [address]  [address]  P: [phone]  F: [phone]      [facility]  [address] [place]  [address]  P: [phone]  F: [phone]      [facility]  [address] [place], [place] [address] [phone]     [facility]   [address] [place] [address]  [phone]     [address] [address]  [phone]     Additional Information:  DANK dugan MD, [place], for an CONNER. Pt will be here a few more  day. Medicine team are watching pt 's kidneys. If they recover enough he could discharge 12/28 or 29th.     Pt has been accepted by 3 facilities. SW reached out to pt's son, Kan, letting him know accepting facilities. Kan is going to review TCU's and get back to SW with the one her prefers for his dad.     SW to follow and assist with any other discharge needs that may arise.  [facility]   5A beds:5220-40  5C beds 5417-32 (no BMT pt's)     Phone: [phone]  Pager: [phone]

## 2023-12-26 NOTE — PROGRESS NOTES
Nephrology Progress Note  12/26/2023         Assessment & Recommendations:   Jc Cloud is a 89 year old male admitted on 12/15/2023. He has a history of coronary artery disease status post CABG, a-fib and AV block on Eliquis, hypertension, depression and anxiety, sleep apnea, essential tremor, mild cognitive impairment, currently admitted for treatment of aspiration pneumonia and developed worsening GER in the setting of recurrent episodes of hypotension secondary of sepsis vs dehydration.     The patient had 2 episodes of hypotension on 12/17 and 12/18 where pressures were in the 70's-80's/50's. Patient was given IV fluids and responded both times, but creatinine subsequently increased.     At this time, the etiology of the patient's GER is likely secondary to transient ischemia/hypoperfusion in the setting of recurrent episodes of hypotension. Renal ultrasound on 12/18/23 demonstrated bilateral renal atrophy L>R without hydronephrosis bilaterally as well as bladder debris that would be consistent with the patient's UTI. Repeat UA was grossly normal. At this time, clinical suspicion is that the patient is transitioning to the ATN phase of his kidney injury. Given his history of CKD, anticipate a longer course before his creatinine begins to improve. Patient's urine output has increased from days prior, which is reassuring. He is euvolemic on exam and has no electrolyte derangements that would indicate need for dialysis. In the event that he were to require dialysis, there would need to be an in-depth discussion with patient and his son regarding the benefits of dialysis given the patient's age and comorbidities.      Creatinine peaked at 6.72 and today is 6.66. Hopefully this indicates that the patient is slowly recovering from ATN. Given the patient's new generalized rash, could consider possible drug reaction to Augmentin and development of AIN. Therefore, will repeat UA and urine protein. In regards to  "hypernatremia, encourage the patient to increase water intake and/or supplement with IV fluids.     Recommendations:   - Continue to monitor renal function   - Avoid nephrotoxic medications where possible  - Continue bicarb 1300 TID   - Can consider D5W to correct free water deficit (1 L of D5W)  - Repeat UA and urine protein random in the AM, which has been ordered     Recommendations were communicated to primary team in person and via note     Seen and discussed with Dr. Sadia Chapa MD   Nephrology Teaching Service     Interval History :   Appears to have had a few voids since lozoya removal. No specific concerns this morning. Creatinine is stable today. He is oriented to time and place but otherwise limited in questioning.     Physical Exam:   I/O last 3 completed shifts:  In: 380 [P.O.:380]  Out: 1550 [Urine:1550]   BP (!) 155/90 (BP Location: Right arm)   Pulse 61   Temp 97  F (36.1  C) (Axillary)   Resp 16   Ht 1.854 m (6' 0.99\")   Wt 82.2 kg (181 lb 3.2 oz)   SpO2 92%   BMI 23.91 kg/m       GENERAL APPEARANCE:   General: Frail-appearing, lying in bed  HEENT: PERRLA, EOMi, mucous membranes are moist   Pulm/Resp: Breathing comfortably on room air.   CV: RRR, no murmurs, rubs, gallops   Abdomen: soft, non-distended, non-tender   Ext: Moves all 4 extremities without difficulty, no pitting edema, 2+ distal pulses   Neuro: Answers questions appropriately, moving all 4 extremities without difficulty.  Skin: Warm and well perfused. Patient has maculopapular rash over the abdomen, bilateral flanks, thighs, and groin area, as well as scattered seborrheic keratoses    Labs:   All labs reviewed by me  Electrolytes/Renal -   Recent Labs   Lab Test 12/26/23  0424 12/25/23  0453 12/24/23  0456   * 146* 144   POTASSIUM 4.7 4.3 4.9   CHLORIDE 110* 112* 111*   CO2 20* 17* 16*   BUN 87.7* 85.5* 80.6*   CR 6.66* 6.78* 6.72*   * 105* 84   MATT 8.2 8.1* 8.0*   MAG 2.3 2.3 2.3   PHOS 7.4* 7.5* 7.8* "       CBC -   Recent Labs   Lab Test 12/20/23  0529 12/19/23  0539 12/18/23  2200   WBC 11.0 14.0* 12.1*   HGB 13.2* 13.8 12.9*   * 131* 130*       LFTs -   Recent Labs   Lab Test 12/17/23  1615 12/15/23  0851   ALKPHOS 85 99   BILITOTAL 0.7 0.6   ALT 18 20   AST 35 34   PROTTOTAL 5.9* 6.0*   ALBUMIN 3.3* 3.4*       Imaging:  US Renal Complete (12/18/23)  IMPRESSION:  1.  Left greater than right renal atrophy without hydronephrosis bilaterally.  2.  Echogenic bladder debris. This is nonspecific and can be seen with  infection or hematuria. Correlated with urinary symptoms and  urinalysis.    Per the radiologist's report which is filed     Current Medications:   apixaban ANTICOAGULANT  2.5 mg Oral BID    [Held by provider] gabapentin  300 mg Oral BID    melatonin  5 mg Oral At Bedtime    [Held by provider] metoprolol succinate ER  12.5 mg Oral BID    midodrine  2.5 mg Oral BID    mirtazapine  15 mg Oral At Bedtime    multivitamin w/minerals  1 tablet Oral QAM    primidone  25 mg Oral Daily    sodium bicarbonate  1,300 mg Oral TID    sodium chloride (PF)  3 mL Intracatheter Q8H    sodium chloride (PF)  3 mL Intracatheter Q8H      - MEDICATION INSTRUCTIONS -       Richi Chapa MD

## 2023-12-26 NOTE — PROGRESS NOTES
CLINICAL NUTRITION SERVICES - REASSESSMENT NOTE     Nutrition Prescription    Malnutrition Status:    Patient does not meet two of the established criteria necessary for diagnosing malnutrition but is at risk for malnutrition    Recommendations already ordered by Registered Dietitian (RD):  Continue room service with assistance services as ordered    Modified oral nutrition supplements as below:  - Discontinue Ensure BID  - Replaced with Nepro (thickened per diet order) TID, one with each meal (420 Kcals, 19 gm protein, 38 gm CHO per carton]  - Decreased Magic Cup frequency from BID to once daily    Continue to encourage PO intake. Seems to accept supplements better than solid foods, based on calorie count documentation (therefore, increased ONS frequency to TID)    Future/Additional Recommendations:  Monitor nutrition-related findings and follow pt per protocol     EVALUATION OF THE PROGRESS TOWARD GOALS   Diet: Regular textures, mildly thick level 2 liquids   Supplement: Ensure Plus, thickened per diet order with breakfast/dinner trays; Magic Cup with lunch/dinner trays   Room-service with assistance services (enc high Kcal/pro sources)    PO Intake: 25% intake most often documented to I/O, though there is some variation with some meals 50-75% consumed.  Russell cts 12/21-12/23 (completed)  12/21: 1630 Kcals, 74 gm pro with 2 meals and 2 ONS documented  12/22: 983 Kcals and 51 gm protein (only 1 out of 3 meals documented so suspect incomplete) + 2 ONS  12/23: 1113 Kcals and 63 gm protein (1 out of 2 meals documented) + 2 ONS   *= Avg 3-day intake of 1242 Kcals (66% goal) and 63 gm pro (100% goal)     NEW FINDINGS   General:  BRENNON at baseline. Mild dementia. New full body rash, dermatology consulted.  Pt assessed with daughter-in-law at bedside. Pt confused, not able to answer most of writer's questions. Not able to tell me who is visiting at bedside. DIL glad to know pt meeting >60% est needs with good Ensure  "acceptance, based on celestino ct data. Discussed plan to revise ONS type based on renal labs (hyperphos, and K+ on high end of acceptable range). Continue to encourage PO intake. Decreasing Magic Cup to once daily as he does not seem to be taking this as consistently.     GI:  0-1 unmeasured BMs per day over past week, per I/O.     Weights:  Lowest/driest wt this admit of 75.1 kg (165 lbs) on 12/17. Currently above this. Continue to monitor trends/fluid status.   Per review of initial RD note, no significant losses recently PTA. Wt trends vary between ~72-75 kg PTA per review. CKD/fluid status likely playing role in weight variation.   Modifying nutrition goals due to renal lab elevation (decrease protein goals), see below:     Dosing Weight: 75 kg (actual, based on lowest/driest wt this admit of 75.1 kg on 12/17)    ASSESSED NUTRITION NEEDS  Estimated Energy Needs: 8868-6823 kcals/day (25 - 30 kcals/kg)  Justification: Maintenance  Estimated Protein Needs: 60-75-90 grams protein/day (0.8- 1 - 1.2 grams of pro/kg)  Justification: Modest protein in setting of CKD    Labs:  Hypernatremia x2 days, Na currently 147.    BUN/Cr elevated; monitor trends. GFR 7 (LOW). Not currently on dialysis. Nephrology follows.  PO4 elevated, currently 7.4. K+ WNL but high end of range at times.   BG trends variable, monitor. (acceptable BG range for hospital setting is 140-180 mg/dL per ADA for most patients)    Medications:  Thera-Vit-M daily   IVF - D5W at 125 mL/hr (end today at 1859 per order); provides 150 gm dextrose, 510 Kcals daily. <--- started per nephrology to provide source free water.    Skin:  Per most recent primary team note: \"red, maculopapular rash that blanches with pressure on the abdomen, groin and back most prominently, no vesicles, no pain on palpation\"  No WOCN notes for present admit.     MALNUTRITION  % Intake: < 75% for > 7 days (moderate)  % Weight Loss: Weight loss does not meet criteria -Fluctuation pending " fluid status, but within usual range based on hx review  Subcutaneous Fat Loss: Generalized mild but c/w losses d/t advanced age, so not counting toward malnutrition diagnosis (continues, agree)  Muscle Loss: Generalized mild-moderate but c/w sarcopenia of advanced age, so not counting toward malnutrition diagnosis (continues, agree)  Fluid Accumulation/Edema: Does not meet criteria (trace)  Malnutrition Diagnosis: Patient does not meet two of the established criteria necessary for diagnosing malnutrition but is at risk for malnutrition    Previous Goals   iet adv v nutrition support within 2-3 days, pending GOC.   Evaluation: Met    Previous Nutrition Diagnosis  Predicted inadequate nutrient intake (protein/kcal) related to potential for neuro status to remain unimproved/further decline and potential for inappropriateness for safe PO.   Evaluation: Improved, no longer applicable, nutrition diagnosis changed below    CURRENT NUTRITION DIAGNOSIS  Inadequate oral intake related to altered mentation/impaired cognition at baseline as evidenced by intake consistently documented as 25% of meals per I/O, ONS to optimize Kcal/protein intake.       INTERVENTIONS  Implementation  Medical food supplement therapy - Revised     Goals  Patient to consume % of nutritionally adequate meal trays TID, or the equivalent with supplements/snacks.    Monitoring/Evaluation  Progress toward goals will be monitored and evaluated per protocol.    Farrukh Ocampo, MS, RDN, LD, CNSC  Available via phone or [employer] chat, and pager  6B work-room RD phone: *[phone]   6B/Obs RD pager: [phone]         Weekend/Holiday RD pager [phone]

## 2023-12-26 NOTE — PROGRESS NOTES
[facility]    Progress Note - Medicine Service, [facility] TEAM 4       Date of Admission:  12/15/2023    Assessment & Plan   Jc Cloud is a 89 year old male admitted on 12/15/2023. He has a history of coronary artery disease status post CABG, A-fib and AV block on Eliquis, hypertension, depression and anxiety, sleep apnea, essential tremor, mild cognitive impairment, chronic kidney disease and presented  after being found confused this morning at his assisted living facility and was found to have acute toxic metabolic encephalopathy and acute hypoxic respiratory failure likely due to pneumonia (community-acquired versus aspiration) with hospital course complicated by GER on CKD and hypernatremia.     Changes today:  -kidney numbers stable  -continues to make urine, no acute indications dialysis  -dermatology consult for full body rash  -add some D5 water today for mild hypernatremia; plan for 1L total and encourage PO    # Acute kidney injury on CKD3a  # Anion Gap Metabolic Acidosis  # Lactic Acidosis,resolved  Baseline creatinine around 1.5.  Kidney function continues to worsen with rising creatinine.  Most likely feel like this is prerenal/hypovolemia related.  Had multiple hypotensive episodes during his hospitalization, around 12/17 and 12/18.  He is still making urine.  Per nursing he is eating and drinking normally.   -Consult nephrology, appreciate recommendations  -Avoid toxic medications  -Renal ultrasound 12/18 grossly normal  -Urinalysis bland  -Trend BMP    #full body, maculopapular rash  Developed 12/25 and persists today. Present on abdomen, groin, back bilaterally. Pt intermittently complains of pain and itchiness, but at other times denies sx. No fevers. Completed abx as below. May be drug exanthem vs other process.   - derm consulted 12/26, appreciate recs    # Acute hypoxic respiratory failure, resolved  Possible etiology of his hypoxia on arrival  includes community-acquired pneumonia or aspiration pneumonia.  While BNP and troponin are elevated, echocardiogram obtained by ED showed normal systolic function and no evidence of volume overload so I feel heart failure is less likely.  There is no evidence of volume overload on exam.  Evening of 12/17 he became more hypoxic with increased work of breathing.  It is possible he had an aspiration event either at that time or earlier in the day.  On 12/18 he was able to be weaned to low-flow nasal cannula with improvement in his work of breathing.  Still favor his hypoxia is related to community-acquired or aspiration pneumonia, with possible component of atelectasis given his prolonged hospitalization.  -Pneumonia treatment as below  -Supplemental oxygen as needed to maintain sats greater than 88%  -Monitor intake and output, daily weights  -Incentive spirometry    # Acute toxic metabolic encephalopathy, resolved  # C/f Urinary tract infection, resolved  # Community-acquired pneumonia vs aspiration pneumonia  Patient is known to have mild cognitive impairment at baseline, though his son states that he is usually a good historian and able to take care of most of his ADLs independently.  He does not previously had admissions for confusion.  This is most likely related to his community-acquired pneumonia/aspiration pneumonia.  He may have had an aspiration event when he was found down on morning of presentation.  Unlikely to be stroke with nonfocal neurologic exam.  His electrolytes were normal, and no contributing medications.  He does have a chronic history of alcohol use with 1 drink at night.  Low suspicion that this is contributing.  Urine culture ultimately grew normal urogenital rodger.  -Follow urine and blood cultures from 12/15/2023; no growth to date  -Frequent reorientation  -PT/OT consults  -SLP cleared patient for regular diet with supervision; mildly thick liquids  -Hold potential contributing PTA meds  including trazodone and gabapentin  -As needed Zyprexa available for sundowning; 2.5 mg    Antibiotics:  -Zosyn x 1 on 12/15  -ceftriaxone 2 g daily 12/15-12/17  -zosyn 12/17-12/21  -Augmentin renally dosed 12/21-12/22 to complete course     # Hypernatremia, stable  Nephrology consulted. D5 water infusion was used 3 x thus far to supplement free water intake.   -Encourage p.o. fluids  -A.m. BMP  - 12/26 D5W 100/hr x 8hrs    # Coronary artery disease status post CABG  # Hypertension  # History of orthostatic hypotension  Possible that patient's orthostatic hypotension contributed to his falls and confusion, especially in the setting of infection.  TTE was obtained with left ventricular ejection fraction of 50% and evidence of pulmonary hypertension. Will   -Hold PTA metoprolol 12.5 mg twice daily given GER and episodes of hypotension  -Continue midodrine 2.5 mg oral twice daily (dose reduced 12/23)     # Atrial fibrillation  # Second-degree AV block  # Status post biventricular pacemaker  Cardiac pacemaker was interrogated in the emergency department and was normal.   -Held apixaban on admission, restarted on 12/16     # Essential tremor  -Continue PTA primidone     # Depression/anxiety  -Continue PTA mirtazapine     # Thrombocytopenia  Mild.  Likely related to marrow suppression in the setting of acute illness.      Diet: Regular Diet Adult    DVT Prophylaxis: as above  Covarrubias Catheter: Not present  Fluids: none  Lines: None     Cardiac Monitoring: None  Code Status: DNR/DNI    Clinically Significant Risk Factors         # Hypernatremia: Highest Na = 147 mmol/L in last 2 days, will monitor as appropriate      # Hypoalbuminemia: Lowest albumin = 3.3 g/dL at 12/17/2023  4:15 PM, will monitor as appropriate      # Acute Kidney Injury, unspecified: based on a >150% or 0.3 mg/dL increase in last creatinine compared to past 90 day average, will monitor renal function  # Hypertension: Noted on problem list            #  Financial/Environmental Concerns: none   # History of CABG: noted on surgical history       Disposition Plan     Expected Discharge Date: 12/26/2023      Destination: assisted living  Discharge Comments: once on room air, kidney function is improving and dispo plan in place      The patient's care was discussed with the  attending physician .    Dar Zaragoza MD  Medicine Service, [facility] TEAM [address] [place] [address]  Securely message with Vocera (more info)  Text page via [employer] Paging/Directory   See signed in provider for up to date coverage information  ______________________________________________________________________    Interval History   Awake and alert this morning.  Sitting up in bed, about to eat breakfast.     Physical Exam   Vital Signs: Temp: 97  F (36.1  C) Temp src: Axillary BP: (!) 155/90 Pulse: 61   Resp: 16 SpO2: 92 % O2 Device: None (Room air)    Weight: 181 lbs 3.2 oz    General Appearance: Elderly male, no acute distress, lying in bed   Eyes: Extraocular movements intact, clear sclera  HEENT: Moist mucous membranes  Respiratory: normal work of breathing, lung sounds coarse, on room air  Cardiovascular: Regular rate and rhythm, no murmurs  GI: Soft, mildly tender, mildly distended  Skin: red, maculopapular rash that blanches with pressure on the abdomen, groin and back most prominently, no vesicles, no pain on palpation  Musculoskeletal: No lower extremity edema, no gross deformities, no obvious trauma  Neurologic: Oriented, no focal deficits    Medical Decision Making       Please see A&P for additional details of medical decision making.      Data     I have personally reviewed the following data over the past 24 hrs:    N/A  \   N/A   / N/A     147 (H) 110 (H) 87.7 (H) /  104 (H)   4.7 20 (L) 6.66 (H) \       Imaging results reviewed over the past 24 hrs:   Recent Results (from the past 24 hour(s))   XR Chest Port 1 View    Narrative    EXAM: XR CHEST PORT  1 VIEW  12/25/2023 4:25 PM     HISTORY:  hx admission asp pna, now with increasing tachypnea       COMPARISON:  12/18/2023    FINDINGS:   AP portable semiupright radiograph of the chest. Left chest wall  cardiac device with intact distal leads. Postsurgical changes of the  chest with multilevel sternotomy wires. Trachea is midline.  Cardiomediastinal silhouette and pulmonary vasculature are within  normal limits. Improved aeration involving the right lower lobe.  Stable retrocardiac pulmonary opacities. Unchanged small left and  trace right pleural effusions. No appreciable pneumothorax.    No acute osseous abnormality. Visualized upper abdomen is  unremarkable.        Impression    IMPRESSION:  1. Decreased right basilar opacities compared to radiograph from  12/18/2023.  2. Stable retrocardiac pulmonary opacities, may represent atelectasis,  infection, or aspiration. Recommend follow up to clearing.  3. Stable small left and trace right pleural effusions.    I have personally reviewed the examination and initial interpretation  and I agree with the findings.    LISETTE GRIMES MD         SYSTEM ID:  [id number]

## 2023-12-26 NOTE — PLAN OF CARE
Hours of care: [id number]     Neuro: A&Ox4.   Cardiac: Afebrile, VSS.            Respiratory: RA, lung sounds have fine crackles in the bases, denies SOB at rest  GI/: Voiding spontaneously, incontinent of urine. No BM this shift.  Diet/appetite: Tolerating regular diet, appetite very poor, did not eat overnight . Denies nausea.  Activity: Up with SBA, GB and walker  Pain: Denies   Skin: Rash on back, abdomen, and groin got worse overnight, provider notified who ordered benadryl cream which pt said helped.  Lines: PIV, SL  Drains: Primfit  Replacements: none given     Plan: Continue with current POC. Report changes to primary team.          Goal Outcome Evaluation:      Plan of Care Reviewed With: patient, family    Overall Patient Progress: no changeOverall Patient Progress: no change

## 2023-12-27 NOTE — PLAN OF CARE
"VSS. Red, flat rash extending over trunk, back, groin area. Denies itching, only sometimes complains of pain if touched. Pills taken one at a time with thickened liquids, requires assistance to eat. Incontinent of Bowel and bladder. Loose stool x2.     Problem: Adult Inpatient Plan of Care  Goal: Plan of Care Review  Description: The Plan of Care Review/Shift note should be completed every shift.  The Outcome Evaluation is a brief statement about your assessment that the patient is improving, declining, or no change.  This information will be displayed automatically on your shift  note.  12/26/2023 1824 by Melvina Stout, RN  Outcome: Not Progressing  12/26/2023 1823 by Melvina Stout RN  Outcome: Not Progressing  Goal: Patient-Specific Goal (Individualized)  Description: You can add care plan individualizations to a care plan. Examples of Individualization might be:  \"Parent requests to be called daily at 9am for status\", \"I have a hard time hearing out of my right ear\", or \"Do not touch me to wake me up as it startles  me\".  Outcome: Not Progressing  Goal: Absence of Hospital-Acquired Illness or Injury  Outcome: Not Progressing  Intervention: Identify and Manage Fall Risk  Recent Flowsheet Documentation  Taken 12/26/2023 1600 by Melvina Stout RN  Safety Promotion/Fall Prevention: safety round/check completed  Taken 12/26/2023 0900 by Melvina Stout RN  Safety Promotion/Fall Prevention: safety round/check completed  Intervention: Prevent Skin Injury  Recent Flowsheet Documentation  Taken 12/26/2023 0900 by Melvina Stout, RN  Body Position: supine  Goal: Optimal Comfort and Wellbeing  Outcome: Not Progressing     Problem: Oral Intake Inadequate  Goal: Improved Oral Intake  Outcome: Not Progressing   Goal Outcome Evaluation:                        "

## 2023-12-27 NOTE — PROGRESS NOTES
Brief Dermatology Note    S:  - Rash seems to be stable, not progressive  - No f/c/LAD reported, feeling okay    O:  - Erythematous blanchable papules coalescing into symmetric plaques affecting the inguinal folds, abdomen, chest, and back  - No facial plethora noted, no cervical or axillary lymphadenopathy appreciated  - No ocular, mucosal, or genital lesions noted  - Pearly pink papule on the right helix    A/P:  1. Erythematous blanchable papules coalescing into symmetric plaques affecting the inguinal folds, abdomen, chest, and back  Patient is a 89 yo male admitted 12/15/23 for acute toxic metabolic encephalopathy and acute hypoxic respiratory failure likely due to pneumonia (community-acquired versus aspiration) with hospital course complicated by GER on CKD and hypernatremia.      Dermatology was consulted for progressive rash. He has been on several antibiotics with onset of rash approximately 2-3 days ago per son and review of nursing notes. Differential at this time includes simple drug exanthem vs viral exanthem vs urticarial phase of AIBD vs less likely DIHS/DRESS.     At this time, we favor a simple drug exanthem, cannot say for certain culprit medication which would require outpatient allergy testing. Fortunately, remains off of antibiotics at this time. He is otherwise stable without recent fevers. No mucosal involvement on exam. Additionally, no drugs have been initiated over typical time frame required for DIHS/DRESS (usually 2 weeks or greater) and he is without other clinical stigmata of DIHS/DRESS.      We previously discussed the role of biopsy with the son over the phone and patient. Unfortunately, biopsy between the considered diagnoses can show similar findings requiring clinical correlation. We opted for close clinical monitoring at this time with future consideration of biopsy if rash worsens or other concerning symptomology develops.        Recommendations  - Trend CBC with differential  and CMP daily  - Continue triamcinolone 0.1% ointment twice daily to rash        This note is for care coordination purposes only. This is not a billable note.     Staff: Dr. Rito Solares MD  PGY-4 Dermatology  Pager: [phone]

## 2023-12-27 NOTE — PLAN OF CARE
"BP (!) 171/99 (BP Location: Right arm)   Pulse 68   Temp 97.6  F (36.4  C) (Oral)   Resp 18   Ht 1.854 m (6' 0.99\")   Wt 82.2 kg (181 lb 3.2 oz)   SpO2 95%   BMI 23.91 kg/m      Afebrile, Hypertensive throughout shift, but within order parameters. Pt denied headache, chest pain and SOB. Generalized body rash - kenalog cream applied, pt winces in discomfort when rash is touched. Good urine output via external cath, BM x1 - incontinence care provided and linens changes, stool is loose and green in color. No replacements indicated. Bed alarm on. Continue with plan of care. Notify primary team with any changes.    Problem: Adult Inpatient Plan of Care  Goal: Optimal Comfort and Wellbeing  Outcome: Not Progressing     Problem: Adult Inpatient Plan of Care  Goal: Plan of Care Review  Description: The Plan of Care Review/Shift note should be completed every shift.  The Outcome Evaluation is a brief statement about your assessment that the patient is improving, declining, or no change.  This information will be displayed automatically on your shift  note.  Outcome: Progressing  Goal: Patient-Specific Goal (Individualized)  Description: You can add care plan individualizations to a care plan. Examples of Individualization might be:  \"Parent requests to be called daily at 9am for status\", \"I have a hard time hearing out of my right ear\", or \"Do not touch me to wake me up as it startles  me\".  Outcome: Progressing   Goal Outcome Evaluation:                        "

## 2023-12-27 NOTE — PROGRESS NOTES
Nephrology Progress Note  12/27/2023         Assessment & Recommendations:   Jc Cloud is a 89 year old male admitted on 12/15/2023. He has a history of coronary artery disease status post CABG, a-fib and AV block on Eliquis, hypertension, depression and anxiety, sleep apnea, essential tremor, mild cognitive impairment, currently admitted for treatment of aspiration pneumonia and developed worsening GRE in the setting of recurrent episodes of hypotension secondary of sepsis vs dehydration.     The patient had 2 episodes of hypotension on 12/17 and 12/18 where pressures were in the 70's-80's/50's. Patient was given IV fluids and responded both times. Creatinine now downtrending after peaking at 6.78.    The etiology of the patient's GER is likely secondary to transient ischemia/hypoperfusion in the setting of recurrent episodes of hypotension. Renal ultrasound on 12/18/23 demonstrated bilateral renal atrophy L>R without hydronephrosis bilaterally as well as bladder debris that would be consistent with the patient's UTI. Repeat UA was grossly normal. Patient has continued to have urine output and electrolytes have remained stable. He is euvolemic on exam and does not meet any indications for dialysis     Creatinine peaked at 6.78 and continues to improve, now 6.08. Hopefully this indicates that the patient is slowly recovering from ATN. Given the patient's new generalized rash, could consider possible drug reaction to Augmentin and development of AIN. Ordered repeat UA and urine protein. Hypernatremia improved with 1 liter of D5W and patient was encouraged to drink more fluids.     Recommendations:   - Continue to monitor renal function   - Avoid nephrotoxic medications where possible  - Continue bicarb 1300 TID   - Repeat UA and urine protein random ordered, will follow up on these results     Recommendations were communicated to primary team in person and via note     Seen and discussed with Dr. Mccullough  "    Richi Chapa MD   Nephrology Teaching Service     Interval History :   No acute overnight events, vitals have remained stable and patient has actually been more hypertensive. Patient tolerating PO without difficulty.     Physical Exam:   I/O last 3 completed shifts:  In: 870 [P.O.:120; I.V.:750]  Out: 1675 [Urine:1675]   BP (!) 171/99 (BP Location: Right arm)   Pulse 68   Temp 97.6  F (36.4  C) (Oral)   Resp 18   Ht 1.854 m (6' 0.99\")   Wt 76.6 kg (168 lb 12.8 oz)   SpO2 95%   BMI 22.28 kg/m       GENERAL APPEARANCE:   General: Frail-appearing, lying in bed  HEENT: PERRLA, EOMi, mucous membranes are moist   Pulm/Resp: Breathing comfortably on room air.   CV: RRR, no murmurs, rubs, gallops   Abdomen: soft, non-distended, non-tender   Ext: Moves all 4 extremities without difficulty, no pitting edema, 2+ distal pulses   Neuro: Answers questions appropriately, moving all 4 extremities without difficulty.  Skin: Warm and well perfused. Patient has maculopapular rash over the abdomen, bilateral flanks, thighs, and groin area, as well as scattered seborrheic keratoses    Labs:   All labs reviewed by me  Electrolytes/Renal -   Recent Labs   Lab Test 12/27/23 0424 12/26/23 0424 12/25/23  0453    147* 146*   POTASSIUM 4.0 4.7 4.3   CHLORIDE 106 110* 112*   CO2 21* 20* 17*   BUN 86.6* 87.7* 85.5*   CR 6.08* 6.66* 6.78*   * 104* 105*   MATT 8.3 8.2 8.1*   MAG 2.1 2.3 2.3   PHOS 6.7* 7.4* 7.5*       CBC -   Recent Labs   Lab Test 12/27/23 0424 12/20/23  0529 12/19/23  0539   WBC 10.7 11.0 14.0*   HGB 14.0 13.2* 13.8    148* 131*       LFTs -   Recent Labs   Lab Test 12/27/23 0424 12/26/23 0424 12/17/23  1615   ALKPHOS 88 86 85   BILITOTAL 0.4 0.4 0.7   ALT 14 18 18   AST 23 24 35   PROTTOTAL 6.4 6.3* 5.9*   ALBUMIN 3.0* 3.1* 3.3*       Imaging:  US Renal Complete (12/18/23)  IMPRESSION:  1.  Left greater than right renal atrophy without hydronephrosis bilaterally.  2.  Echogenic bladder " debris. This is nonspecific and can be seen with  infection or hematuria. Correlated with urinary symptoms and  urinalysis.    Per the radiologist's report which is filed     Current Medications:   apixaban ANTICOAGULANT  2.5 mg Oral BID    [Held by provider] gabapentin  300 mg Oral BID    melatonin  5 mg Oral At Bedtime    [Held by provider] metoprolol succinate ER  12.5 mg Oral BID    midodrine  2.5 mg Oral BID    mirtazapine  15 mg Oral At Bedtime    multivitamin w/minerals  1 tablet Oral QAM    primidone  25 mg Oral Daily    sodium bicarbonate  1,300 mg Oral TID    sodium chloride (PF)  3 mL Intracatheter Q8H    sodium chloride (PF)  3 mL Intracatheter Q8H    triamcinolone   Topical BID      - MEDICATION INSTRUCTIONS -       Richi Chapa MD

## 2023-12-27 NOTE — PROGRESS NOTES
[facility]    Progress Note - Medicine Service, [facility] TEAM 4       Date of Admission:  12/15/2023    Assessment & Plan   Jc Cloud is a 89 year old male admitted on 12/15/2023. He has a history of coronary artery disease status post CABG, A-fib and AV block on Eliquis, hypertension, depression and anxiety, sleep apnea, essential tremor, mild cognitive impairment, chronic kidney disease and presented  after being found confused this morning at his assisted living facility and was found to have acute toxic metabolic encephalopathy and acute hypoxic respiratory failure likely due to pneumonia (community-acquired versus aspiration) with hospital course complicated by GER on CKD and hypernatremia.     Changes today:  -kidney numbers improved  -continues to make urine, no acute indications dialysis  -continue triamcinolone 0.1% ointment BID dermatology consult for full body rash  -encourage PO  -potentially medically ready to discharge in next 1-2 days    # Acute kidney injury on CKD3a  # Anion Gap Metabolic Acidosis  # Lactic Acidosis,resolved  Baseline creatinine around 1.5.  Kidney function continues to worsen with rising creatinine.  Most likely feel like this is prerenal/hypovolemia related.  Had multiple hypotensive episodes during his hospitalization, around 12/17 and 12/18.  He is still making urine.  Per nursing he is eating and drinking normally.   -Consult nephrology, appreciate recommendations  -Avoid toxic medications  -Renal ultrasound 12/18 grossly normal  -Continue bicarb 1300 TID    -Urinalysis bland; repeat UA 12/27  -Trend BMP    #full body, maculopapular rash  Developed 12/25 and persists today. Present on abdomen, groin, back bilaterally. Pt intermittently complains of pain and itchiness, but at other times denies sx. No fevers. Completed abx as below. Differential at this time includes simple drug exanthem vs viral exanthem vs less likely  DIHS/DRESS.   - derm consulted 12/26, appreciate recs  - Trend CBC with differential and CMP daily  - Start triamcinolone 0.1% ointment twice daily to rash    # Acute hypoxic respiratory failure, resolved  Possible etiology of his hypoxia on arrival includes community-acquired pneumonia or aspiration pneumonia.  While BNP and troponin are elevated, echocardiogram obtained by ED showed normal systolic function and no evidence of volume overload so I feel heart failure is less likely.  There is no evidence of volume overload on exam.  Evening of 12/17 he became more hypoxic with increased work of breathing.  It is possible he had an aspiration event either at that time or earlier in the day.  On 12/18 he was able to be weaned to low-flow nasal cannula with improvement in his work of breathing.  Still favor his hypoxia is related to community-acquired or aspiration pneumonia, with possible component of atelectasis given his prolonged hospitalization.  -Pneumonia treatment as below  -Supplemental oxygen as needed to maintain sats greater than 88%  -Monitor intake and output, daily weights  -Incentive spirometry    # Acute toxic metabolic encephalopathy, resolved  # C/f Urinary tract infection, resolved  # Community-acquired pneumonia vs aspiration pneumonia  Patient is known to have mild cognitive impairment at baseline, though his son states that he is usually a good historian and able to take care of most of his ADLs independently.  He does not previously had admissions for confusion.  This is most likely related to his community-acquired pneumonia/aspiration pneumonia.  He may have had an aspiration event when he was found down on morning of presentation.  Unlikely to be stroke with nonfocal neurologic exam.  His electrolytes were normal, and no contributing medications.  He does have a chronic history of alcohol use with 1 drink at night.  Low suspicion that this is contributing.  Urine culture ultimately grew  normal urogenital rodger.  -Follow urine and blood cultures from 12/15/2023; no growth to date  -Frequent reorientation  -PT/OT consults  -SLP cleared patient for regular diet with supervision; mildly thick liquids  -Hold potential contributing PTA meds including trazodone and gabapentin  -As needed Zyprexa available for sundowning; 2.5 mg    Antibiotics:  -Zosyn x 1 on 12/15  -ceftriaxone 2 g daily 12/15-12/17  -zosyn 12/17-12/21  -Augmentin renally dosed 12/21-12/22 to complete course     # Hypernatremia   Nephrology consulted. D5 water infusion was used 3 x thus far to supplement free water intake.   -Encourage p.o. fluids  -A.m. BMP  - 12/26 D5W 100/hr x 8hrs    # Coronary artery disease status post CABG  # Hypertension  # History of orthostatic hypotension  Possible that patient's orthostatic hypotension contributed to his falls and confusion, especially in the setting of infection.  TTE was obtained with left ventricular ejection fraction of 50% and evidence of pulmonary hypertension. Will   -Hold PTA metoprolol 12.5 mg twice daily given GER and episodes of hypotension  -Continue midodrine 2.5 mg oral twice daily (dose reduced 12/23)     # Atrial fibrillation  # Second-degree AV block  # Status post biventricular pacemaker  Cardiac pacemaker was interrogated in the emergency department and was normal.   -Held apixaban on admission, restarted on 12/16     # Essential tremor  -Continue PTA primidone     # Depression/anxiety  -Continue PTA mirtazapine     # Thrombocytopenia  Mild.  Likely related to marrow suppression in the setting of acute illness.      Diet: Regular Diet Adult    DVT Prophylaxis: as above  Covarrubias Catheter: Not present  Fluids: none  Lines: None     Cardiac Monitoring: None  Code Status: DNR/DNI    Clinically Significant Risk Factors         # Hypernatremia: Highest Na = 147 mmol/L in last 2 days, will monitor as appropriate      # Hypoalbuminemia: Lowest albumin = 3 g/dL at 12/27/2023  4:24 AM,  will monitor as appropriate      # Acute Kidney Injury, unspecified: based on a >150% or 0.3 mg/dL increase in last creatinine compared to past 90 day average, will monitor renal function  # Hypertension: Noted on problem list            # Financial/Environmental Concerns: none   # History of CABG: noted on surgical history       Disposition Plan     Expected Discharge Date: 12/28/2023      Destination: assisted living  Discharge Comments: once kidney fx improves      The patient's care was discussed with the  attending physician .    Dar Zaragoza MD  Medicine Service, [facility] TEAM [address] [place] [address]  Securely message with Vocera (more info)  Text page via [facility] Paging/Directory   See signed in provider for up to date coverage information  ______________________________________________________________________    Interval History   Sleeping this morning but when woken states feeling ok. No pain or itchiness on the skin. Appetite ok, breathing is good.     Physical Exam   Vital Signs: Temp: 97.6  F (36.4  C) Temp src: Oral BP: (!) 171/99 Pulse: 68   Resp: 18 SpO2: 95 % O2 Device: Nasal cannula Oxygen Delivery: 1 LPM  Weight: 168 lbs 12.8 oz    General Appearance: Elderly male, no acute distress, lying in bed sleeping, rouses to voice  Eyes: Extraocular movements intact, clear sclera  HEENT: Moist mucous membranes  Respiratory: normal work of breathing, lung sounds coarse, on room air  Cardiovascular: Regular rate and rhythm, no murmurs  GI: Soft, mildly tender, mildly distended  Skin: red, maculopapular rash that blanches with pressure on the abdomen, groin and back most prominently, no vesicles, some pain on palpation in RLQ  Musculoskeletal: No lower extremity edema, no gross deformities, no obvious trauma  Neurologic: Oriented, no focal deficits    Medical Decision Making       Please see A&P for additional details of medical decision making.      Data     I have personally  reviewed the following data over the past 24 hrs:    10.7  \   14.0   / 297     143 106 86.6 (H) /  106 (H)   4.0 21 (L) 6.08 (H) \     ALT: 14 AST: 23 AP: 88 TBILI: 0.4   ALB: 3.0 (L) TOT PROTEIN: 6.4 LIPASE: N/A       Imaging results reviewed over the past 24 hrs:   No results found for this or any previous visit (from the past 24 hour(s)).

## 2023-12-28 NOTE — PLAN OF CARE
Problem: Adult Inpatient Plan of Care  Goal: Plan of Care Review  Description: The Plan of Care Review/Shift note should be completed every shift.  The Outcome Evaluation is a brief statement about your assessment that the patient is improving, declining, or no change.  This information will be displayed automatically on your shift  note.  Outcome: Not Progressing   Goal Outcome Evaluation:  Pt had no complaints of nausea, but did not eat. Is hypertensive; held midodrine per order. Is GTZ; lung sounds diminished throughout. Oriented to self and time. Is weak, but tries to get out of bed; bed alarm on and 3 side rails up.  Trace to mild LE edema. Condom cath placed (primo-fit would not stick due to TMC ointment). Has angry red rash on trunk, groin, and back of upper thighs. Bilateral flank - painful to the touch;  denied pain otherwise.  Declined turning x 2.  Buttock/sacral skin intact.  SW paged for Hospice per son's request.

## 2023-12-28 NOTE — DEATH PRONOUNCEMENT
MD DEATH PRONOUNCEMENT    Called to pronounce Jc Cloud dead.    Physical Exam: Unresponsive to noxious stimuli, Spontaneous respirations absent, Breath sounds absent, Heart sounds absent, Pupillary light reflex absent, and Corneal blink reflex absent    Patient was pronounced dead at 746: AM, 2023.    Preliminary Cause of Death: Kidney Failure    Principal Problem:    Acute kidney failure, unspecified (H)  Active Problems:    Tremor    Hypoxia    Acute lower UTI    Anticoagulated    Fall, initial encounter    History of dementia    Altered mental status, unspecified altered mental status type    Aspiration pneumonia of right lower lobe, unspecified aspiration pneumonia type (H)       Infectious disease present?: NO    Communicable disease present? (examples: HIV, chicken pox, TB, Ebola, CJD) :  NO    Multi-drug resistant organism present? (example: MRSA): NO    Please consider an autopsy if any of the following exist:  NO Unexpected or unexplained death during or following any dental, medical, or surgical diagnostic treatment procedures.   NO Death of mother at or up to seven days after delivery.     NO All  and pediatric deaths.     NO Death where the cause is sufficiently obscure to delay completion of the death certificate.   NO Deaths in which autopsy would confirm a suspected illness/condition that would affect surviving family members or recipients of transplanted organs.     The following deaths must be reported to the 's Office:  NO A death that may be due entirely or in part to any factors other than natural disease (recent surgery, recent trauma, suspected abuse/neglect).   NO A death that may be an accident, suicide, or homicide.     NO Any sudden, unexpected death in which there is no prior history of significant heart disease or any other condition associated with sudden death.   NO A death under suspicious, unusual, or unexpected circumstances.    NO Any death which  is apparently due to natural causes but in which the  does not have a personal physician familiar with the patient s medical history, social, or environmental situation or the circumstances of the terminal event.   NO Any death apparently due to Sudden Infant Death Syndrome.     NO Deaths that occur during, in association with, or as consequences of a diagnostic, therapeutic, or anesthetic procedure.   NO Any death in which a fracture of a major bone has occurred within the past (6) six months.   NO A death of persons note seen by their physician within 120 days of demise.     NO Any death in which the  was an inmate of a public institution or was in the custody of Law Enforcement personnel.   NO  All unexpected deaths of children   NO Solid organ donors   NO Unidentified bodies   NO Deaths of persons whose bodies are to be cremated or otherwise disposed of so that the bodies will later be unavailable for examination;   NO Deaths unattended by a physician outside of a licensed healthcare facility or licensed residential hospice program   NO Deaths occurring within 24 hours of arrival to a health care facility if death is unexpected.    NO Deaths associated with the decedent s employment.   NO Deaths attributed to acts of terrorism.   NO Any death in which there is uncertainty as to whether it is a medical examiner s care should be discussed with the medical investigator.        Body disposition: Autopsy was discussed with family member:  Son by phone.  Permission for autopsy was declined.    Dar Zaragoza MD  PGY4  Internal Medicine-Pediatrics

## 2023-12-28 NOTE — DISCHARGE SUMMARY
[facility]  Hospitalist Discharge Summary      Date of Admission:  12/15/2023  Date of Discharge:  2023  Discharging Provider: Jeannie Munoz MD  Discharge Service: Medicine Service, BETHANY TEAM 4    Discharge Diagnoses   # Acute kidney injury on CKD3a  # Anion Gap Metabolic Acidosis  # Lactic Acidosis,resolved  # Sepsis, resolved and treated  # Full body, maculopapular rash  # Acute hypoxic respiratory failure, resolved  # Acute toxic metabolic encephalopathy, resolved  # C/f Urinary tract infection, resolved  # Community-acquired pneumonia vs aspiration pneumonia  # Hypernatremia   # Coronary artery disease status post CABG  # Hypertension  # History of orthostatic hypotension  # Atrial fibrillation  # Second-degree AV block  # Status post biventricular pacemaker  # Essential tremor  # Depression/anxiety  # Thrombocytopenia    Clinically Significant Risk Factors          Follow-ups Needed After Discharge     Unresulted Labs Ordered in the Past 30 Days of this Admission       No orders found from 11/15/2023 to 2023.          Discharge Disposition   Patient  during this admission  Condition at discharge:     Hospital Course   Jc Cloud is a 89 year old male admitted on 12/15/2023. He has a history of coronary artery disease status post CABG, A-fib and AV block on Eliquis, hypertension, depression and anxiety, sleep apnea, essential tremor, mild cognitive impairment, chronic kidney disease and presented  after being found confused at his assisted living facility and was found to have acute toxic metabolic encephalopathy and acute hypoxic respiratory failure likely due to pneumonia (community-acquired versus aspiration) with hospital course complicated by GER on CKD and hypernatremia. During his hospitalization his respiratory failure resolved after being treated w/ a full course of abx of presumed CAP. Septic work up was otherwise unrevealing for  other etiologies of his AMS. His kidney function was monitored closely (for GER) with nephrology consulted and was improving steadily. Unfortunately on 12/28 AM, patient was found unresponsive  and had passed away; pt was DNR/DNI. Of note, pt did have significant cardiac hx (CAD s/p CABG and afib bs/p biventricular pacemaker) that may have led to his death.      Consultations This Hospital Stay   NEUROLOGY GENERAL ADULT IP CONSULT  PHYSICAL THERAPY ADULT IP CONSULT  OCCUPATIONAL THERAPY ADULT IP CONSULT  SPEECH LANGUAGE PATH ADULT IP CONSULT  NURSING TO CONSULT FOR VASCULAR ACCESS CARE IP CONSULT  SPEECH LANGUAGE PATH ADULT IP CONSULT  PHARMACY TO DOSE VANCO  NURSING TO CONSULT FOR VASCULAR ACCESS CARE IP CONSULT  NEPHROLOGY GENERAL ADULT IP CONSULT  CARE MANAGEMENT / SOCIAL WORK IP CONSULT  NURSING TO CONSULT FOR VASCULAR ACCESS CARE IP CONSULT  DERMATOLOGY IP CONSULT    Code Status   No CPR- Do NOT Intubate    Time Spent on this Encounter   I, Jeannie Munoz MD, personally saw the patient today and spent greater than 30 minutes discharging this patient.       Jeannie Munoz MD  [facility] [address] [place] [id number]  Phone: [phone]  Fax: [phone]  ______________________________________________________________________    Physical Exam   Vital Signs: Temp: 98.2  F (36.8  C) Temp src: Axillary BP: (!) 68/46 (RN notified) Pulse: 72   Resp: 14 SpO2: 92 % O2 Device: None (Room air)    Weight: 168 lbs 12.8 oz  I did not examine the patient       Primary Care Physician   David Muhammad    Discharge Orders   No discharge procedures on file.    Significant Results and Procedures   Most Recent 3 CBC's:  Recent Labs   Lab Test 12/28/23  0652 12/27/23 0424 12/20/23  0529   WBC 11.7* 10.7 11.0   HGB 13.9 14.0 13.2*   * 104* 111*    297 148*     Most Recent 3 BMP's:  Recent Labs   Lab Test 12/28/23  0652 12/27/23 2038 12/27/23 0424 12/26/23  0424   NA  146*  --  143 147*   POTASSIUM 4.2  --  4.0 4.7   CHLORIDE 108*  --  106 110*   CO2 21*  --  21* 20*   BUN 82.7*  --  86.6* 87.7*   CR 5.55*  --  6.08* 6.66*   ANIONGAP 17*  --  16* 17*   MATT 8.3  --  8.3 8.2   * 113* 106* 104*   ,   Results for orders placed or performed during the hospital encounter of 12/15/23   XR Chest Port 1 View    Narrative    EXAM: XR CHEST PORT 1 VIEW 12/15/2023 9:09 AM    DEMOGRAPHICS: 89 years Male    INDICATION: fall. Hypoxic on seen, requiring supplemental oxygen.  Tachypnea.    COMPARISON: None    TECHNIQUE: Single portable AP view of the chest.    FINDINGS:   Median sternotomy wires, most inferior wire is fractured. Mediastinal  surgical clips. Left chest wall implanted, dual-chamber pacemaker.    Trachea is midline. Cardiac silhouette is mildly enlarged. No  pneumothorax. Small left pleural effusion. Mild perihilar and  bibasilar streaky/patchy opacities, greatest in the right lower lobe.  Upper abdomen exhibits nonspecific bowel gas pattern. Chronic  appearing deformity of the lateral right sixth rib. Chronic  deformities of posterior left ribs.      Impression    IMPRESSION:   1.  Perihilar and bibasilar streaky/patchy opacities, greatest in the  right lower lobe. Findings may represent atelectasis, pulmonary edema,  aspiration, however cannot exclude infection.  2.  Likely small left pleural effusion.  3.  Chronic appearing rib deformities bilaterally.     I have personally reviewed the examination and initial interpretation  and I agree with the findings.    SUSANNA ZELAYA MD         SYSTEM ID:  [id number]   CT Head w/o Contrast    Narrative    CT HEAD W/O CONTRAST 12/15/2023 9:30 AM    History: fall     Comparison: CT 12/28/2019    Technique: Using multidetector thin collimation helical acquisition  technique, axial, coronal and sagittal CT images from the skull base  to the vertex were obtained without intravenous contrast.   (topogram) image(s) also obtained and  reviewed.    Findings: There is no intracranial hemorrhage, mass effect, or midline  shift. Gray/white matter differentiation in both cerebral hemispheres  is preserved. Possible chronic infarction right lateral to the right  caudate head. Moderate nonspecific bilateral periventricular white  matter hypodensities. Mild diffuse cerebral atrophy. Ventricles are  proportionate to the cerebral sulci. The basal cisterns are clear.  Vascular calcifications of the carotid siphons and vertebral arteries.    The bony calvaria and the bones of the skull base are normal. Right  maxillary wall thickening. Scattered mucosal thickening of the ethmoid  air cells. Left greater than right soft tissue debris within the  external auditory canals, presumably cerumen. Bilateral pseudophakia.      Impression    Impression:  1. No acute intracranial pathology.   2. Moderate sequela of chronic small vessel ischemic disease and mild  generalized cerebral parenchymal volume loss.    I have personally reviewed the examination and initial interpretation  and I agree with the findings.    MANJU HOOD MD         SYSTEM ID:  [id number]   XR Chest Port 1 View    Narrative    Portable chest    INDICATION: Hypoxia worsening    COMPARISON: 12/15/2023    FINDINGS: Heart size normal. Median sternotomy again present.  Atherosclerotic calcification of the aortic knob. Triple lead  pacemaker from left subclavian transvenous approach again noted. Left  costophrenic angle remains obscured and there is silhouetting left  hemidiaphragm with continued prominent retrocardiac opacification.      Impression    IMPRESSION: Findings consistent with unchanged retrocardiac  atelectasis and possible superimposed left pleural effusion. Recommend  follow-up to clearing to exclude infection. Pacemaker.    TOMMY HANKS MD         SYSTEM ID:  [id number]   XR Chest Port 1 View    Narrative    Exam: XR CHEST PORT 1 VIEW, 12/17/2023 4:37 PM    Comparison: Same-day  chest radiograph 12/17/2023 at 1150    History: sudden worsening hypoxia, h/o spontaneous pneumothorax in  past    Technique: Portable AP view of the chest.     Findings:  *  Stable postsurgical changes of median sternotomy and left chest  pacemaker with leads in the right atrium and right ventricle.  *  Normal heart size with left cardiac border and hemidiaphragmatic  silhouetting.   *  New right middle and lower lobe mixed airspace interstitial  opacities.  *  No pneumothorax.  *  Bilateral costophrenic angle blunting, left greater than right.  *  Chronic posterior right-sided rib deformities.  *  Soft tissues are unremarkable.   *  Partially visualized upper abdomen is within normal limits.      Impression    Impression:   1. New right middle and lower lobe mixed airspace and interstitial  opacities could represent pulmonary edema or aspiration.  2. Unchanged left greater than right pleural effusions.    I have personally reviewed the examination and initial interpretation  and I agree with the findings.    DENISA ENRIQUE MD         SYSTEM ID:  [id number]   US Renal Complete Non-Vascular    Narrative    EXAMINATION: US RENAL COMPLETE NON-VASCULAR, 12/18/2023 9:48 AM     COMPARISON: None.    HISTORY: Worsening acute kidney injury.    TECHNIQUE: The kidneys and bladder were scanned in the standard  fashion with specialized ultrasound transducer(s) using both gray  scale and limited color/spectral Doppler techniques.    FINDINGS: Limited visualization of bilateral kidneys given acoustic  windows.    Right kidney: Measures 9.2 cm in length. Parenchyma is of echogenicity  with mild thinning. No focal mass. No hydronephrosis.    Left kidney: Measures 10.6 cm in length. Parenchyma is diffusely  thinned with normal echogenicity. No focal mass. No hydronephrosis.     Bladder: Partially distended with dependent echogenic debris.      Impression    IMPRESSION:  1.  Left greater than right renal atrophy without  hydronephrosis  bilaterally.  2.  Echogenic bladder debris. This is nonspecific and can be seen with  infection or hematuria. Correlated with urinary symptoms and  urinalysis.    I have personally reviewed the examination and initial interpretation  and I agree with the findings.    AR RODRÍGUEZ MD         SYSTEM ID:  [id number]   XR Chest Port 1 View    Narrative    EXAM: XR CHEST PORT 1 VIEW  12/18/2023 11:02 PM     HISTORY: More and desaturation and aspiration       COMPARISON: Radiographs 12/17/2023    TECHNIQUE: Portable frontal radiograph of the chest.    FINDINGS: Stable cardiac pacer with intact leads in stable position.  Median sternotomy wires, the inferior most wire is fractured similar  to prior. Stable cardiomegaly. Increased bibasilar and retrocardiac  opacity. Increased interstitial opacification. Likely small bilateral  pleural effusions.      Impression    IMPRESSION: Increased bibasilar retrocardiac opacities likely  representing atelectasis, infection, or aspiration. Mild interstitial  opacities likely representing pulmonary edema or infection. Stable  cardiomegaly.    I have personally reviewed the examination and initial interpretation  and I agree with the findings.    ROBERTO SETH DO         SYSTEM ID:  [id number]   XR Chest Port 1 View    Narrative    EXAM: XR CHEST PORT 1 VIEW  12/25/2023 4:25 PM     HISTORY:  hx admission asp pna, now with increasing tachypnea       COMPARISON:  12/18/2023    FINDINGS:   AP portable semiupright radiograph of the chest. Left chest wall  cardiac device with intact distal leads. Postsurgical changes of the  chest with multilevel sternotomy wires. Trachea is midline.  Cardiomediastinal silhouette and pulmonary vasculature are within  normal limits. Improved aeration involving the right lower lobe.  Stable retrocardiac pulmonary opacities. Unchanged small left and  trace right pleural effusions. No appreciable pneumothorax.    No acute osseous abnormality.  Visualized upper abdomen is  unremarkable.        Impression    IMPRESSION:  1. Decreased right basilar opacities compared to radiograph from  2023.  2. Stable retrocardiac pulmonary opacities, may represent atelectasis,  infection, or aspiration. Recommend follow up to clearing.  3. Stable small left and trace right pleural effusions.    I have personally reviewed the examination and initial interpretation  and I agree with the findings.    LISETTE GRIMES MD         SYSTEM ID:  [id number]   Echocardiogram Complete     Value    LVEF  50-55% (borderline)    [facility]    [id number]  [id number]  [id number]  [id number]^CODY^SAMARA^GEOVANNI     [facility],[place]  Echocardiography Laboratory  [address] [place] [address]     Name: LAUREN MILLS  MRN: [id number]  : 1931  Study Date: 12/15/2023 01:17 PM  Age: 89 yrs  Gender: Male  Patient Location: [facility]  Reason For Study: Syncope  Ordering Physician: SAMARA ROSS  Performed By: Camila Mason     BSA: 2.1 m2  Height: 74 in  Weight: 183 lb  HR: 86  BP: 120/72 mmHg  ______________________________________________________________________________  Procedure  Complete Portable Echo Adult.  ______________________________________________________________________________  Interpretation Summary  Left ventricular function is decreased. The ejection fraction is 50-55%  (borderline).  Global right ventricular function is borderline reduced.  Mild to moderate mitral insufficiency is present.  Mild aortic insufficiency is present.  Moderate tricuspid insufficiency is present. The right ventricular systolic  pressure is 45mmHg above the right atrial pressure. Pulmonary hypertension is  present.  No pericardial effusion is present.  Previous study not available for comparison.  ______________________________________________________________________________  Left Ventricle  Left ventricular size is normal. Left ventricular function is  "decreased. The  ejection fraction is 50-55% (borderline). Abnormal septal motion consistent  with pacemaker is present.     Right Ventricle  Global right ventricular function is borderline reduced. A pacemaker lead is  noted in the right ventricle.     Mitral Valve  Mild to moderate mitral insufficiency is present.     Aortic Valve  Moderate aortic valve calcification is present. Mild aortic insufficiency is  present.     Tricuspid Valve  Moderate tricuspid insufficiency is present. The right ventricular systolic  pressure is 45mmHg above the right atrial pressure. Pulmonary hypertension is  present.     Pulmonic Valve  The pulmonic valve is normal. Trace pulmonic insufficiency is present.     Vessels  Proximal ascending aorta is borderline dilated measuring 4cm. The inferior  vena cava cannot be assessed.     Pericardium  No pericardial effusion is present.     Compared to Previous Study  Previous study not available for comparison.     ______________________________________________________________________________  MMode/2D Measurements & Calculations  asc Aorta Diam: 4.0 cm     EF(MOD-bp): 48.7 %  Asc Ao diam index BSA (cm/m2): 1.9     Asc Ao diam index Ht(cm/m): 2.1     Doppler Measurements & Calculations  PA acc time: 0.09 sec  TR max bibi: 383.7 cm/sec  TR max P.9 mmHg     ______________________________________________________________________________  Report approved by: Vitaly Nj 12/15/2023 02:49 PM             Discharge Medications   Current Discharge Medication List        CONTINUE these medications which have NOT CHANGED    Details   acetaminophen (ACETAMINOPHEN EXTRA STRENGTH) 500 MG tablet Take 2 tablets (1,000 mg) by mouth 2 times daily. May also take 2 tablets (1,000 mg) daily as needed for mild pain.  Qty: 112 tablet, Refills: 97    Comments: \"Cycle refill request. Cycle restarts (8/3/23).\"  Associated Diagnoses: Pain      ELIQUIS ANTICOAGULANT 2.5 MG tablet TAKE 1 TABLET BY " "MOUTH TWICE DAILY  Qty: 56 tablet, Refills: 97    Comments: \"Cycle refill request. Cycle restarts (2/16/23).\"  Associated Diagnoses: Chronic a-fib (H)      gabapentin (NEURONTIN) 300 MG capsule TAKE 1 CAPSULE BY MOUTH TWICE DAILY  Qty: 180 capsule, Refills: 97    Comments: CYCLE FILL REQUEST FOR CYCLE THAT STARTS 04/13/2023  Associated Diagnoses: Tremor      metoprolol succinate ER (TOPROL XL) 25 MG 24 hr tablet Take 0.5 tablets (12.5 mg) by mouth 2 times daily  Qty: 30 tablet, Refills: 11    Associated Diagnoses: Chronic a-fib (H)      midodrine (PROAMATINE) 5 MG tablet TAKE 1 TABLET BY MOUTH TWICE DAILY  Qty: 180 tablet, Refills: 97    Comments: CYCLE FILL REQUEST FOR CYCLE THAT STARTS 04/13/2023  Associated Diagnoses: Chronic a-fib (H)      mirtazapine (REMERON) 15 MG tablet TAKE 1 TABLET BY MOUTH AT BEDTIME  Qty: 28 tablet, Refills: 97    Comments: \"Cycle refill request. Cycle restarts (2/16/23).\"  Associated Diagnoses: Episode of recurrent major depressive disorder, unspecified depression episode severity (H24)      polyethylene glycol (MIRALAX) 17 GM/Dose powder MIX 17GM OF POWDER IN 8OZ OF WATER UNTIL COMPLETELY DISSOLVED. DRINK SOLUTION BY MOUTH ONCE DAILY.  Qty: 510 g, Refills: 99    Associated Diagnoses: Constipation      primidone (MYSOLINE) 50 MG tablet TAKE ONE-HALF TABLET (25 MG) BY MOUTH TWICE DAILY  Qty: 28 tablet, Refills: 88    Comments: \"Cycle refill request. Cycle restarts (2/16/23).\"  Associated Diagnoses: Tremor      traZODone (DESYREL) 50 MG tablet TAKE 1 TABLET BY MOUTH AT BEDTIME AS NEEDED  Qty: 90 tablet, Refills: 97    Comments: please authorize more refills for pt thanks!  Associated Diagnoses: Insomnia      Vitamin D3 (VITAMIN D) 10 MCG (400 UNIT) tablet TAKE 1 TABLET BY MOUTH ONCE DAILY  Qty: 90 tablet, Refills: 97    Comments: CYCLE FILL REQUEST FOR CYCLE THAT STARTS 04/13/2023  Associated Diagnoses: Pain           Allergies   Allergies   Allergen Reactions    Atenolol     Lisinopril  "    Meperidine     Metoprolol     Quetiapine     Triamterene

## 2023-12-28 NOTE — PROGRESS NOTES
Nursing assistant called she can not get patient VS around 730 am, nurse checked patient and found patient is not breathing and no obtain heart beat, MD was called and 746 am pronounced, family was notified patient was DNR, DNI, life resouce was called

## 2024-02-20 ENCOUNTER — APPOINTMENT (RX ONLY)
Dept: URBAN - METROPOLITAN AREA CLINIC 146 | Facility: CLINIC | Age: 89
Setting detail: DERMATOLOGY
End: 2024-02-20

## 2024-02-20 DIAGNOSIS — D49.2 NEOPLASM OF UNSPECIFIED BEHAVIOR OF BONE, SOFT TISSUE, AND SKIN: ICD-10-CM

## 2024-02-20 DIAGNOSIS — Z85.828 PERSONAL HISTORY OF OTHER MALIGNANT NEOPLASM OF SKIN: ICD-10-CM | Status: RESOLVED

## 2024-02-20 PROCEDURE — 11102 TANGNTL BX SKIN SINGLE LES: CPT

## 2024-02-20 PROCEDURE — 99212 OFFICE O/P EST SF 10 MIN: CPT | Mod: 25

## 2024-02-20 PROCEDURE — ? COUNSELING

## 2024-02-20 PROCEDURE — ? BIOPSY BY SHAVE METHOD

## 2024-02-20 ASSESSMENT — LOCATION DETAILED DESCRIPTION DERM
LOCATION DETAILED: RIGHT DISTAL POSTERIOR UPPER ARM
LOCATION DETAILED: STERNUM

## 2024-02-20 ASSESSMENT — LOCATION SIMPLE DESCRIPTION DERM
LOCATION SIMPLE: CHEST
LOCATION SIMPLE: RIGHT UPPER ARM

## 2024-02-20 ASSESSMENT — LOCATION ZONE DERM
LOCATION ZONE: ARM
LOCATION ZONE: TRUNK

## 2024-02-20 NOTE — PROCEDURE: BIOPSY BY SHAVE METHOD
Detail Level: Detailed
Depth Of Biopsy: dermis
Was A Bandage Applied: Yes
Size Of Lesion In Cm: 1.4
X Size Of Lesion In Cm: 0
Biopsy Type: H and E
Biopsy Method: Personna blade
Anesthesia Type: 1% lidocaine without epinephrine and a 1:10 solution of 8.4% sodium bicarbonate
Anesthesia Volume In Cc: 2.5
Hemostasis: Monsel's and Electrocautery
Wound Care: Vaseline
Dressing: pressure dressing
Destruction After The Procedure: No
Type Of Destruction Used: Curettage
Curettage Text: The wound bed was treated with curettage after the biopsy was performed.
Cryotherapy Text: The wound bed was treated with cryotherapy after the biopsy was performed.
Electrodesiccation Text: The wound bed was treated with electrodesiccation after the biopsy was performed.
Electrodesiccation And Curettage Text: The wound bed was treated with electrodesiccation and curettage after the biopsy was performed.
Silver Nitrate Text: The wound bed was treated with silver nitrate after the biopsy was performed.
Lab: -[id number]
Path Notes (To The Dermatopathologist): Check M&D
Consent: Written consent was obtained and risks were reviewed including but not limited to scarring, infection, bleeding, scabbing, incomplete removal, nerve damage and allergy to anesthesia.
Post-Care Instructions: I reviewed with the patient in detail post-care instructions. Patient is to keep the biopsy site dry overnight, and then apply Vaseline \\n until healed.
Notification Instructions: Patient will be notified of biopsy results.
Billing Type: Third-Party Bill
Information: Selecting Yes will display possible errors in your note based on the variables you have selected. This validation is only offered as a suggestion for you. PLEASE NOTE THAT THE VALIDATION TEXT WILL BE REMOVED WHEN YOU FINALIZE YOUR NOTE. IF YOU WANT TO FAX A PRELIMINARY NOTE YOU WILL NEED TO TOGGLE THIS TO 'NO' IF YOU DO NOT WANT IT IN YOUR FAXED NOTE.

## 2024-03-15 ENCOUNTER — APPOINTMENT (RX ONLY)
Dept: URBAN - METROPOLITAN AREA CLINIC 146 | Facility: CLINIC | Age: 89
Setting detail: DERMATOLOGY
End: 2024-03-15

## 2024-03-15 PROBLEM — D04.61 CARCINOMA IN SITU OF SKIN OF RIGHT UPPER LIMB, INCLUDING SHOULDER: Status: ACTIVE | Noted: 2024-03-15

## 2024-03-15 PROCEDURE — 17262 DSTRJ MAL LES T/A/L 1.1-2.0: CPT

## 2024-03-15 PROCEDURE — ? CURETTAGE AND DESTRUCTION

## 2024-03-15 NOTE — PROCEDURE: CURETTAGE AND DESTRUCTION
Detail Level: Detailed
Biopsy Photograph Reviewed: Yes
Accession # (Optional): [id number]
Number Of Curettages: 3
Size Of Lesion After Curettage: 1.5
Add Intralesional Injection: No
Concentration (Mg/Ml Or Millions Of Plaque Forming Units/Cc): 0.01
Total Volume (Ccs): 1
Anesthesia Type: 1% lidocaine without epinephrine and a 1:10 solution of 8.4% sodium bicarbonate
Anesthesia Volume In Cc: 2
Cautery Type: electrodesiccation
What Was Performed First?: Curettage
Final Size Statement: The size of the lesion after treatment was
Additional Information: (Optional): The wound was cleaned, and a pressure dressing was applied.  The patient received detailed post-op instructions.
Consent was obtained from the patient. The risks, benefits and alternatives to therapy were discussed in detail. Prior to the procedure, the treatment site was clearly identified and confirmed by the patient. All components of Universal Protocol/PAUSE Rule completed.
Post-Care Instructions: I reviewed with the patient in detail post-care instructions. Patient is to keep the area dry for 24 hours.
Bill As A Line Item Or As Units: Line Item

## 2024-05-01 ENCOUNTER — RX ONLY (OUTPATIENT)
Age: 89
Setting detail: RX ONLY
End: 2024-05-01

## 2024-05-01 RX ORDER — PERMETHRIN 50 MG/G
CREAM TOPICAL
Qty: 60 | Refills: 2 | Status: ERX | COMMUNITY
Start: 2024-05-01

## 2024-05-01 RX ORDER — IVERMECTIN 3 MG/1
TABLET ORAL
Qty: 8 | Refills: 0 | Status: ERX | COMMUNITY
Start: 2024-05-01

## 2024-05-01 RX ORDER — TRIAMCINOLONE ACETONIDE 1 MG/G
CREAM TOPICAL
Qty: 454 | Refills: 0 | Status: ERX | COMMUNITY
Start: 2024-05-01

## 2024-06-20 ENCOUNTER — APPOINTMENT (RX ONLY)
Dept: URBAN - METROPOLITAN AREA CLINIC 146 | Facility: CLINIC | Age: 89
Setting detail: DERMATOLOGY
End: 2024-06-20

## 2024-06-20 DIAGNOSIS — L82.1 OTHER SEBORRHEIC KERATOSIS: ICD-10-CM

## 2024-06-20 DIAGNOSIS — Z12.83 ENCOUNTER FOR SCREENING FOR MALIGNANT NEOPLASM OF SKIN: ICD-10-CM

## 2024-06-20 DIAGNOSIS — L81.4 OTHER MELANIN HYPERPIGMENTATION: ICD-10-CM

## 2024-06-20 DIAGNOSIS — D18.0 HEMANGIOMA: ICD-10-CM

## 2024-06-20 DIAGNOSIS — L57.0 ACTINIC KERATOSIS: ICD-10-CM

## 2024-06-20 DIAGNOSIS — B86 SCABIES: ICD-10-CM | Status: RESOLVED

## 2024-06-20 PROBLEM — D18.01 HEMANGIOMA OF SKIN AND SUBCUTANEOUS TISSUE: Status: ACTIVE | Noted: 2024-06-20

## 2024-06-20 PROCEDURE — ? ORDER FOR PHOTODYNAMIC THERAPY

## 2024-06-20 PROCEDURE — ? COUNSELING

## 2024-06-20 PROCEDURE — ? LIQUID NITROGEN

## 2024-06-20 PROCEDURE — ? RECOMMENDATIONS

## 2024-06-20 PROCEDURE — 99213 OFFICE O/P EST LOW 20 MIN: CPT | Mod: 25

## 2024-06-20 PROCEDURE — 17004 DESTROY PREMAL LESIONS 15/>: CPT

## 2024-06-20 ASSESSMENT — LOCATION DETAILED DESCRIPTION DERM
LOCATION DETAILED: LEFT LATERAL TRAPEZIAL NECK
LOCATION DETAILED: LEFT POSTERIOR SHOULDER
LOCATION DETAILED: LEFT SUPERIOR HELIX
LOCATION DETAILED: RIGHT SUPERIOR UPPER BACK
LOCATION DETAILED: RIGHT TRAGUS
LOCATION DETAILED: RIGHT MEDIAL SUPERIOR CHEST
LOCATION DETAILED: NASAL DORSUM
LOCATION DETAILED: RIGHT MID-UPPER BACK
LOCATION DETAILED: RIGHT DISTAL ULNAR DORSAL FOREARM
LOCATION DETAILED: XIPHOID
LOCATION DETAILED: RIGHT INFERIOR MEDIAL MALAR CHEEK
LOCATION DETAILED: LEFT MEDIAL ZYGOMA
LOCATION DETAILED: RIGHT CENTRAL MALAR CHEEK
LOCATION DETAILED: LEFT FOREHEAD
LOCATION DETAILED: RIGHT DISTAL DORSAL FOREARM
LOCATION DETAILED: LEFT SUPERIOR UPPER BACK
LOCATION DETAILED: RIGHT LATERAL SUPERIOR CHEST
LOCATION DETAILED: RIGHT MEDIAL ZYGOMA
LOCATION DETAILED: LEFT TRAGUS
LOCATION DETAILED: LEFT MID-UPPER BACK
LOCATION DETAILED: LEFT INFERIOR UPPER BACK
LOCATION DETAILED: EPIGASTRIC SKIN
LOCATION DETAILED: LEFT PROXIMAL DORSAL FOREARM
LOCATION DETAILED: LEFT LATERAL SUPERIOR CHEST
LOCATION DETAILED: LEFT MEDIAL SUPERIOR CHEST
LOCATION DETAILED: LEFT CLAVICULAR NECK
LOCATION DETAILED: LEFT CENTRAL MALAR CHEEK
LOCATION DETAILED: RIGHT LATERAL TRAPEZIAL NECK
LOCATION DETAILED: LEFT INFERIOR LATERAL FOREHEAD
LOCATION DETAILED: RIGHT POSTERIOR SHOULDER
LOCATION DETAILED: RIGHT INFERIOR UPPER BACK
LOCATION DETAILED: LEFT MEDIAL INFERIOR CHEST
LOCATION DETAILED: RIGHT ANTERIOR PROXIMAL UPPER ARM
LOCATION DETAILED: RIGHT CLAVICULAR NECK

## 2024-06-20 ASSESSMENT — LOCATION SIMPLE DESCRIPTION DERM
LOCATION SIMPLE: LEFT SHOULDER
LOCATION SIMPLE: LEFT ANTERIOR NECK
LOCATION SIMPLE: LEFT FOREHEAD
LOCATION SIMPLE: RIGHT SHOULDER
LOCATION SIMPLE: CHEST
LOCATION SIMPLE: RIGHT ANTERIOR NECK
LOCATION SIMPLE: RIGHT FOREARM
LOCATION SIMPLE: LEFT EAR
LOCATION SIMPLE: RIGHT UPPER ARM
LOCATION SIMPLE: RIGHT CHEEK
LOCATION SIMPLE: LEFT FOREARM
LOCATION SIMPLE: LEFT UPPER BACK
LOCATION SIMPLE: RIGHT EAR
LOCATION SIMPLE: LEFT ZYGOMA
LOCATION SIMPLE: RIGHT UPPER BACK
LOCATION SIMPLE: NOSE
LOCATION SIMPLE: POSTERIOR NECK
LOCATION SIMPLE: ABDOMEN
LOCATION SIMPLE: LEFT CHEEK
LOCATION SIMPLE: RIGHT ZYGOMA

## 2024-06-20 ASSESSMENT — LOCATION ZONE DERM
LOCATION ZONE: FACE
LOCATION ZONE: EAR
LOCATION ZONE: ARM
LOCATION ZONE: TRUNK
LOCATION ZONE: NOSE
LOCATION ZONE: NECK

## 2024-06-20 NOTE — PROCEDURE: ORDER FOR PHOTODYNAMIC THERAPY
Detail Level: Simple
Arms And Hands Incubation Time: 2 Hours
Photosensitizer: Levulan
Face And Ears Incubation Time: 1 Hour
History Of Hsv (Optional): No
Consent: The procedure and risks were reviewed with the patient including but not limited to: burning, pigmentary changes, pain, blistering, scabbing, redness, and the possibility of needing numerous treatments. Strict photoprotection after the procedure was also discussed.
Frequency Of Pdt: Two treatments 6 weeks apart
Face And Scalp Incubation Time: 1 Hour for the face and 2 Hours for the scalp
Pdt Type: Blue Light
Location: Face

## 2024-12-20 ENCOUNTER — APPOINTMENT (OUTPATIENT)
Dept: URBAN - METROPOLITAN AREA CLINIC 146 | Facility: CLINIC | Age: 89
Setting detail: DERMATOLOGY
End: 2024-12-20

## 2024-12-20 DIAGNOSIS — L82.1 OTHER SEBORRHEIC KERATOSIS: ICD-10-CM

## 2024-12-20 DIAGNOSIS — L57.8 OTHER SKIN CHANGES DUE TO CHRONIC EXPOSURE TO NONIONIZING RADIATION: ICD-10-CM

## 2024-12-20 DIAGNOSIS — L57.0 ACTINIC KERATOSIS: ICD-10-CM

## 2024-12-20 DIAGNOSIS — L81.4 OTHER MELANIN HYPERPIGMENTATION: ICD-10-CM

## 2024-12-20 DIAGNOSIS — D18.0 HEMANGIOMA: ICD-10-CM

## 2024-12-20 DIAGNOSIS — Z12.83 ENCOUNTER FOR SCREENING FOR MALIGNANT NEOPLASM OF SKIN: ICD-10-CM

## 2024-12-20 DIAGNOSIS — D49.2 NEOPLASM OF UNSPECIFIED BEHAVIOR OF BONE, SOFT TISSUE, AND SKIN: ICD-10-CM

## 2024-12-20 DIAGNOSIS — Z85.828 PERSONAL HISTORY OF OTHER MALIGNANT NEOPLASM OF SKIN: ICD-10-CM

## 2024-12-20 PROBLEM — D18.01 HEMANGIOMA OF SKIN AND SUBCUTANEOUS TISSUE: Status: ACTIVE | Noted: 2024-12-20

## 2024-12-20 PROCEDURE — ? ORDER FOR PHOTODYNAMIC THERAPY

## 2024-12-20 PROCEDURE — ? COUNSELING

## 2024-12-20 PROCEDURE — ? LIQUID NITROGEN

## 2024-12-20 PROCEDURE — 11102 TANGNTL BX SKIN SINGLE LES: CPT | Mod: 59

## 2024-12-20 PROCEDURE — 17004 DESTROY PREMAL LESIONS 15/>: CPT

## 2024-12-20 PROCEDURE — ? BIOPSY BY SHAVE METHOD

## 2024-12-20 PROCEDURE — 99213 OFFICE O/P EST LOW 20 MIN: CPT | Mod: 25

## 2024-12-20 ASSESSMENT — LOCATION DETAILED DESCRIPTION DERM
LOCATION DETAILED: LEFT INFERIOR LATERAL UPPER BACK
LOCATION DETAILED: RIGHT CENTRAL MALAR CHEEK
LOCATION DETAILED: LEFT LATERAL INFERIOR EYELID
LOCATION DETAILED: RIGHT DISTAL POSTERIOR THIGH
LOCATION DETAILED: LEFT SUPERIOR HELIX
LOCATION DETAILED: LEFT PROXIMAL CALF
LOCATION DETAILED: LEFT ANTERIOR PROXIMAL THIGH
LOCATION DETAILED: RIGHT CLAVICULAR SKIN
LOCATION DETAILED: RIGHT POSTERIOR SHOULDER
LOCATION DETAILED: RIGHT CENTRAL ZYGOMA
LOCATION DETAILED: LEFT INFERIOR POSTAURICULAR SKIN
LOCATION DETAILED: RIGHT INFERIOR CENTRAL MALAR CHEEK
LOCATION DETAILED: LEFT UPPER CUTANEOUS LIP
LOCATION DETAILED: LEFT ANTERIOR LATERAL DISTAL UPPER ARM
LOCATION DETAILED: LEFT POSTERIOR SHOULDER
LOCATION DETAILED: RIGHT DISTAL POSTERIOR UPPER ARM
LOCATION DETAILED: LEFT DISTAL POSTERIOR UPPER ARM
LOCATION DETAILED: LEFT LATERAL MALAR CHEEK
LOCATION DETAILED: RIGHT LATERAL MALAR CHEEK
LOCATION DETAILED: INFERIOR THORACIC SPINE
LOCATION DETAILED: LEFT ANTERIOR PROXIMAL UPPER ARM
LOCATION DETAILED: RIGHT VENTRAL PROXIMAL FOREARM
LOCATION DETAILED: RIGHT CENTRAL BUCCAL CHEEK
LOCATION DETAILED: LEFT SUPERIOR UPPER BACK
LOCATION DETAILED: RIGHT SUPERIOR HELIX
LOCATION DETAILED: RIGHT MID-UPPER BACK
LOCATION DETAILED: RIGHT MEDIAL TRAPEZIAL NECK
LOCATION DETAILED: RIGHT MEDIAL SUPERIOR CHEST
LOCATION DETAILED: NASAL TIP
LOCATION DETAILED: RIGHT INFERIOR MEDIAL MALAR CHEEK
LOCATION DETAILED: LEFT INFERIOR TEMPLE
LOCATION DETAILED: STERNUM
LOCATION DETAILED: RIGHT ANTERIOR PROXIMAL THIGH
LOCATION DETAILED: EPIGASTRIC SKIN
LOCATION DETAILED: PERIUMBILICAL SKIN
LOCATION DETAILED: RIGHT ANTIHELIX
LOCATION DETAILED: RIGHT LATERAL SUPERIOR CHEST
LOCATION DETAILED: LEFT SUPERIOR PREAURICULAR CHEEK
LOCATION DETAILED: LEFT SUPERIOR MEDIAL MALAR CHEEK

## 2024-12-20 ASSESSMENT — LOCATION SIMPLE DESCRIPTION DERM
LOCATION SIMPLE: LEFT LIP
LOCATION SIMPLE: RIGHT UPPER BACK
LOCATION SIMPLE: RIGHT CLAVICULAR SKIN
LOCATION SIMPLE: RIGHT UPPER ARM
LOCATION SIMPLE: LEFT UPPER BACK
LOCATION SIMPLE: CHEST
LOCATION SIMPLE: RIGHT EAR
LOCATION SIMPLE: RIGHT SHOULDER
LOCATION SIMPLE: RIGHT FOREARM
LOCATION SIMPLE: SCALP
LOCATION SIMPLE: RIGHT ZYGOMA
LOCATION SIMPLE: RIGHT CHEEK
LOCATION SIMPLE: LEFT TEMPLE
LOCATION SIMPLE: ABDOMEN
LOCATION SIMPLE: LEFT EAR
LOCATION SIMPLE: LEFT CHEEK
LOCATION SIMPLE: UPPER BACK
LOCATION SIMPLE: RIGHT THIGH
LOCATION SIMPLE: LEFT CALF
LOCATION SIMPLE: LEFT UPPER ARM
LOCATION SIMPLE: LEFT SHOULDER
LOCATION SIMPLE: NOSE
LOCATION SIMPLE: RIGHT POSTERIOR THIGH
LOCATION SIMPLE: LEFT THIGH
LOCATION SIMPLE: POSTERIOR NECK
LOCATION SIMPLE: LEFT INFERIOR EYELID

## 2024-12-20 ASSESSMENT — LOCATION ZONE DERM
LOCATION ZONE: EAR
LOCATION ZONE: EYELID
LOCATION ZONE: ARM
LOCATION ZONE: LEG
LOCATION ZONE: FACE
LOCATION ZONE: LIP
LOCATION ZONE: SCALP
LOCATION ZONE: NOSE
LOCATION ZONE: NECK
LOCATION ZONE: TRUNK

## 2024-12-20 NOTE — PROCEDURE: BIOPSY BY SHAVE METHOD
Detail Level: Detailed
Depth Of Biopsy: dermis
Was A Bandage Applied: Yes
Size Of Lesion In Cm: 0.5
X Size Of Lesion In Cm: 0
Biopsy Type: H and E
Biopsy Method: Dermablade
Anesthesia Type: 1% lidocaine with epinephrine
Hemostasis: Drysol
Wound Care: Petrolatum
Dressing: bandage
Destruction After The Procedure: No
Type Of Destruction Used: Curettage
Curettage Text: The wound bed was treated with curettage after the biopsy was performed.
Cryotherapy Text: The wound bed was treated with cryotherapy after the biopsy was performed.
Electrodesiccation Text: The wound bed was treated with electrodesiccation after the biopsy was performed.
Electrodesiccation And Curettage Text: The wound bed was treated with electrodesiccation and curettage after the biopsy was performed.
Silver Nitrate Text: The wound bed was treated with silver nitrate after the biopsy was performed.
Lab: -[id number]
Path Notes (To The Dermatopathologist): Check M&D
Medical Necessity Information: It is in your best interest to select a reason for this procedure from the list below. All of these items fulfill various CMS LCD requirements except the new and changing color options.
Consent: Written consent was obtained and risks were reviewed including but not limited to scarring, infection, bleeding, scabbing, incomplete removal, nerve damage and allergy to anesthesia.
Post-Care Instructions: I reviewed with the patient in detail post-care instructions. Patient is to keep the biopsy site dry overnight, and then apply bacitracin twice daily until healed. Patient may apply hydrogen peroxide soaks to remove any crusting.
Notification Instructions: Patient will be notified of biopsy results. However, patient instructed to call the office if not contacted within 2 weeks.
Billing Type: Third-Party Bill
Information: Selecting Yes will display possible errors in your note based on the variables you have selected. This validation is only offered as a suggestion for you. PLEASE NOTE THAT THE VALIDATION TEXT WILL BE REMOVED WHEN YOU FINALIZE YOUR NOTE. IF YOU WANT TO FAX A PRELIMINARY NOTE YOU WILL NEED TO TOGGLE THIS TO 'NO' IF YOU DO NOT WANT IT IN YOUR FAXED NOTE.

## 2024-12-20 NOTE — PROCEDURE: ORDER FOR PHOTODYNAMIC THERAPY
Face Incubation Time: 1 Hour
Chest Incubation Time: 2 Hours
Consent: The procedure and risks were reviewed with the patient including but not limited to: burning, pigmentary changes, pain, blistering, scabbing, redness, and the possibility of needing numerous treatments. Strict photoprotection after the procedure was also discussed.
Occlusion: No
Photosensitizer: Levulan
Detail Level: Simple
Location: Face
Pdt Type: LISA-U
Frequency Of Pdt: Single Treatment
Face And Scalp Incubation Time: 1 Hour for the face and 2 Hours for the scalp

## 2025-02-26 ENCOUNTER — APPOINTMENT (OUTPATIENT)
Dept: URBAN - METROPOLITAN AREA CLINIC 146 | Facility: CLINIC | Age: OVER 89
Setting detail: DERMATOLOGY
End: 2025-02-26

## 2025-02-26 DIAGNOSIS — Z85.828 PERSONAL HISTORY OF OTHER MALIGNANT NEOPLASM OF SKIN: ICD-10-CM

## 2025-02-26 DIAGNOSIS — D49.2 NEOPLASM OF UNSPECIFIED BEHAVIOR OF BONE, SOFT TISSUE, AND SKIN: ICD-10-CM

## 2025-02-26 DIAGNOSIS — L57.0 ACTINIC KERATOSIS: ICD-10-CM

## 2025-02-26 PROCEDURE — 17003 DESTRUCT PREMALG LES 2-14: CPT

## 2025-02-26 PROCEDURE — ? COUNSELING

## 2025-02-26 PROCEDURE — ? BIOPSY BY SHAVE METHOD

## 2025-02-26 PROCEDURE — 11102 TANGNTL BX SKIN SINGLE LES: CPT

## 2025-02-26 PROCEDURE — ? LIQUID NITROGEN

## 2025-02-26 PROCEDURE — ? ADDITIONAL NOTES

## 2025-02-26 PROCEDURE — 17000 DESTRUCT PREMALG LESION: CPT | Mod: 59

## 2025-02-26 ASSESSMENT — LOCATION SIMPLE DESCRIPTION DERM
LOCATION SIMPLE: CHEST
LOCATION SIMPLE: LEFT UPPER BACK
LOCATION SIMPLE: RIGHT CHEEK
LOCATION SIMPLE: LEFT INFERIOR EYELID
LOCATION SIMPLE: LEFT CHEEK
LOCATION SIMPLE: LEFT FOREHEAD

## 2025-02-26 ASSESSMENT — LOCATION ZONE DERM
LOCATION ZONE: TRUNK
LOCATION ZONE: FACE
LOCATION ZONE: EYELID

## 2025-02-26 ASSESSMENT — LOCATION DETAILED DESCRIPTION DERM
LOCATION DETAILED: LEFT LATERAL INFERIOR CHEST
LOCATION DETAILED: LEFT SUPERIOR FOREHEAD
LOCATION DETAILED: LEFT INFERIOR LATERAL UPPER BACK
LOCATION DETAILED: LEFT SUPERIOR LATERAL MALAR CHEEK
LOCATION DETAILED: RIGHT SUPERIOR PREAURICULAR CHEEK
LOCATION DETAILED: LEFT LATERAL INFERIOR EYELID
LOCATION DETAILED: RIGHT INFERIOR LATERAL MALAR CHEEK

## 2025-02-26 NOTE — PROCEDURE: ADDITIONAL NOTES
Detail Level: Simple
Render Risk Assessment In Note?: no
Additional Notes: (Left inferior lateral upper back) bx: 12/20/24 margins negative

## 2025-02-26 NOTE — PROCEDURE: LIQUID NITROGEN
Render Post-Care Instructions In Note?: no
Detail Level: Simple
Post-Care Instructions: I reviewed with the patient in detail post-care instructions. Patient is to wear sunprotection, and avoid picking at any of the treated lesions. Pt may apply Vaseline to crusted or scabbing areas.
Duration Of Freeze Thaw-Cycle (Seconds): 0
Show Applicator Variable?: Yes
Application Tool (Optional): Liquid Nitrogen Sprayer
Consent: The patient's consent was obtained including but not limited to risks of crusting, scabbing, blistering, scarring, darker or lighter pigmentary change, recurrence, incomplete removal and infection.

## 2025-02-26 NOTE — PROCEDURE: BIOPSY BY SHAVE METHOD
Detail Level: Detailed
Depth Of Biopsy: dermis
Was A Bandage Applied: Yes
Size Of Lesion In Cm: 0.6
X Size Of Lesion In Cm: 0
Biopsy Type: H and E
Biopsy Method: Personna blade
Anesthesia Type: 1% lidocaine with epinephrine and a 1:10 solution of 8.4% sodium bicarbonate
Anesthesia Volume In Cc: 0.5
Hemostasis: Aluminum Chloride and Electrocautery
Wound Care: Petrolatum
Dressing: bandage
Destruction After The Procedure: No
Type Of Destruction Used: Curettage
Curettage Text: The wound bed was treated with curettage after the biopsy was performed.
Cryotherapy Text: The wound bed was treated with cryotherapy after the biopsy was performed.
Electrodesiccation Text: The wound bed was treated with electrodesiccation after the biopsy was performed.
Electrodesiccation And Curettage Text: The wound bed was treated with electrodesiccation and curettage after the biopsy was performed.
Silver Nitrate Text: The wound bed was treated with silver nitrate after the biopsy was performed.
Lab: [id number]
Medical Necessity Information: It is in your best interest to select a reason for this procedure from the list below. All of these items fulfill various CMS LCD requirements except the new and changing color options.
Consent: Written consent was obtained and risks were reviewed including but not limited to scarring, infection, bleeding, scabbing, incomplete removal, nerve damage and allergy to anesthesia.
Post-Care Instructions: I reviewed with the patient in detail post-care instructions. Patient is to keep the biopsy site dry overnight, and then apply bacitracin twice daily until healed. Patient may apply hydrogen peroxide soaks to remove any crusting.
Notification Instructions: Patient will be notified of biopsy results. However, patient instructed to call the office if not contacted within 2 weeks.
Billing Type: Third-Party Bill
Information: Selecting Yes will display possible errors in your note based on the variables you have selected. This validation is only offered as a suggestion for you. PLEASE NOTE THAT THE VALIDATION TEXT WILL BE REMOVED WHEN YOU FINALIZE YOUR NOTE. IF YOU WANT TO FAX A PRELIMINARY NOTE YOU WILL NEED TO TOGGLE THIS TO 'NO' IF YOU DO NOT WANT IT IN YOUR FAXED NOTE.

## 2025-04-02 ENCOUNTER — APPOINTMENT (OUTPATIENT)
Dept: URBAN - METROPOLITAN AREA CLINIC 146 | Facility: CLINIC | Age: OVER 89
Setting detail: DERMATOLOGY
End: 2025-04-02

## 2025-04-02 DIAGNOSIS — L57.0 ACTINIC KERATOSIS: ICD-10-CM | Status: RESOLVED

## 2025-04-02 PROCEDURE — 17004 DESTROY PREMAL LESIONS 15/>: CPT

## 2025-04-02 PROCEDURE — ? COUNSELING

## 2025-04-02 PROCEDURE — ? DIAGNOSIS COMMENT

## 2025-04-02 PROCEDURE — 99213 OFFICE O/P EST LOW 20 MIN: CPT | Mod: 25

## 2025-04-02 PROCEDURE — ? LIQUID NITROGEN

## 2025-04-02 ASSESSMENT — LOCATION SIMPLE DESCRIPTION DERM
LOCATION SIMPLE: LEFT FOREHEAD
LOCATION SIMPLE: RIGHT EAR
LOCATION SIMPLE: LEFT EAR
LOCATION SIMPLE: LEFT CHEEK
LOCATION SIMPLE: RIGHT NOSE
LOCATION SIMPLE: LEFT INFERIOR EYELID
LOCATION SIMPLE: NOSE
LOCATION SIMPLE: RIGHT CHEEK
LOCATION SIMPLE: RIGHT FOREHEAD

## 2025-04-02 ASSESSMENT — LOCATION DETAILED DESCRIPTION DERM
LOCATION DETAILED: RIGHT ANTITRAGUS
LOCATION DETAILED: RIGHT NASAL ALA
LOCATION DETAILED: RIGHT SUPERIOR HELIX
LOCATION DETAILED: LEFT SUPERIOR LATERAL MALAR CHEEK
LOCATION DETAILED: RIGHT SUPERIOR MEDIAL MALAR CHEEK
LOCATION DETAILED: LEFT SUPERIOR HELIX
LOCATION DETAILED: LEFT LATERAL MALAR CHEEK
LOCATION DETAILED: LEFT FOREHEAD
LOCATION DETAILED: RIGHT SUPERIOR PREAURICULAR CHEEK
LOCATION DETAILED: LEFT CENTRAL MALAR CHEEK
LOCATION DETAILED: RIGHT INFERIOR FOREHEAD
LOCATION DETAILED: NASAL DORSUM
LOCATION DETAILED: RIGHT INFERIOR LATERAL MALAR CHEEK
LOCATION DETAILED: LEFT LATERAL INFERIOR EYELID
LOCATION DETAILED: LEFT INFERIOR CENTRAL MALAR CHEEK
LOCATION DETAILED: NASAL ROOT
LOCATION DETAILED: RIGHT INFERIOR CENTRAL MALAR CHEEK

## 2025-04-02 ASSESSMENT — LOCATION ZONE DERM
LOCATION ZONE: EYELID
LOCATION ZONE: EAR
LOCATION ZONE: FACE
LOCATION ZONE: NOSE

## 2025-04-02 NOTE — PROCEDURE: DIAGNOSIS COMMENT
Detail Level: Simple
Comment: Verruccous hypertrophic actinic keratosis. Biopsy proven on 03/02/2025. Clinically resolved
Render Risk Assessment In Note?: no

## 2025-06-24 ENCOUNTER — APPOINTMENT (OUTPATIENT)
Dept: URBAN - METROPOLITAN AREA CLINIC 146 | Facility: CLINIC | Age: OVER 89
Setting detail: DERMATOLOGY
End: 2025-06-24

## 2025-06-24 DIAGNOSIS — L82.1 OTHER SEBORRHEIC KERATOSIS: ICD-10-CM

## 2025-06-24 DIAGNOSIS — L57.0 ACTINIC KERATOSIS: ICD-10-CM

## 2025-06-24 DIAGNOSIS — Z12.83 ENCOUNTER FOR SCREENING FOR MALIGNANT NEOPLASM OF SKIN: ICD-10-CM

## 2025-06-24 DIAGNOSIS — Z85.828 PERSONAL HISTORY OF OTHER MALIGNANT NEOPLASM OF SKIN: ICD-10-CM

## 2025-06-24 DIAGNOSIS — L81.4 OTHER MELANIN HYPERPIGMENTATION: ICD-10-CM

## 2025-06-24 DIAGNOSIS — D18.0 HEMANGIOMA: ICD-10-CM

## 2025-06-24 DIAGNOSIS — D49.2 NEOPLASM OF UNSPECIFIED BEHAVIOR OF BONE, SOFT TISSUE, AND SKIN: ICD-10-CM

## 2025-06-24 PROBLEM — D18.01 HEMANGIOMA OF SKIN AND SUBCUTANEOUS TISSUE: Status: ACTIVE | Noted: 2025-06-24

## 2025-06-24 PROCEDURE — ?: Mod: 25

## 2025-06-24 PROCEDURE — ? COUNSELING

## 2025-06-24 PROCEDURE — ? LIQUID NITROGEN

## 2025-06-24 PROCEDURE — ? BIOPSY BY SHAVE METHOD

## 2025-06-24 PROCEDURE — ?

## 2025-06-24 PROCEDURE — ? RECOMMENDATIONS

## 2025-06-24 PROCEDURE — ?: Mod: 59

## 2025-06-24 PROCEDURE — ? ADDITIONAL NOTES

## 2025-06-24 ASSESSMENT — LOCATION DETAILED DESCRIPTION DERM
LOCATION DETAILED: RIGHT ANTITRAGUS
LOCATION DETAILED: LEFT CENTRAL LATERAL NECK
LOCATION DETAILED: RIGHT CENTRAL MALAR CHEEK
LOCATION DETAILED: RIGHT INFERIOR HELIX
LOCATION DETAILED: EPIGASTRIC SKIN
LOCATION DETAILED: RIGHT SUPERIOR CENTRAL MALAR CHEEK
LOCATION DETAILED: RIGHT DISTAL PRETIBIAL REGION
LOCATION DETAILED: LEFT DISTAL DORSAL FOREARM
LOCATION DETAILED: LEFT PROXIMAL DORSAL FOREARM
LOCATION DETAILED: RIGHT INFERIOR MEDIAL MALAR CHEEK
LOCATION DETAILED: RIGHT LATERAL INFERIOR EYELID
LOCATION DETAILED: STERNUM
LOCATION DETAILED: RIGHT SUPERIOR UPPER BACK
LOCATION DETAILED: RIGHT PROXIMAL PRETIBIAL REGION
LOCATION DETAILED: PHILTRUM
LOCATION DETAILED: LEFT INFERIOR UPPER BACK
LOCATION DETAILED: RIGHT INFERIOR UPPER BACK
LOCATION DETAILED: LEFT RIB CAGE
LOCATION DETAILED: LEFT SUPERIOR LATERAL MALAR CHEEK
LOCATION DETAILED: RIGHT SUPERIOR HELIX
LOCATION DETAILED: LEFT SUPERIOR UPPER BACK
LOCATION DETAILED: RIGHT MID-UPPER BACK
LOCATION DETAILED: RIGHT VENTRAL PROXIMAL FOREARM
LOCATION DETAILED: LEFT CENTRAL MALAR CHEEK
LOCATION DETAILED: LEFT CLAVICULAR NECK
LOCATION DETAILED: XIPHOID
LOCATION DETAILED: RIGHT POSTERIOR SHOULDER
LOCATION DETAILED: RIGHT MEDIAL SUPERIOR CHEST
LOCATION DETAILED: RIGHT CLAVICULAR NECK
LOCATION DETAILED: RIGHT LATERAL TRAPEZIAL NECK
LOCATION DETAILED: LEFT INFERIOR CENTRAL MALAR CHEEK
LOCATION DETAILED: LEFT MID-UPPER BACK
LOCATION DETAILED: LEFT ANTERIOR PROXIMAL UPPER ARM
LOCATION DETAILED: LEFT POSTERIOR SHOULDER
LOCATION DETAILED: LEFT LATERAL FOREHEAD
LOCATION DETAILED: RIGHT VENTRAL DISTAL FOREARM
LOCATION DETAILED: LEFT LATERAL TRAPEZIAL NECK

## 2025-06-24 ASSESSMENT — LOCATION SIMPLE DESCRIPTION DERM
LOCATION SIMPLE: RIGHT SHOULDER
LOCATION SIMPLE: LEFT SHOULDER
LOCATION SIMPLE: LEFT FOREHEAD
LOCATION SIMPLE: NECK
LOCATION SIMPLE: RIGHT PRETIBIAL REGION
LOCATION SIMPLE: CHEST
LOCATION SIMPLE: RIGHT INFERIOR EYELID
LOCATION SIMPLE: ABDOMEN
LOCATION SIMPLE: LEFT CHEEK
LOCATION SIMPLE: UPPER LIP
LOCATION SIMPLE: LEFT ANTERIOR NECK
LOCATION SIMPLE: POSTERIOR NECK
LOCATION SIMPLE: RIGHT UPPER BACK
LOCATION SIMPLE: LEFT UPPER ARM
LOCATION SIMPLE: RIGHT CHEEK
LOCATION SIMPLE: RIGHT ANTERIOR NECK
LOCATION SIMPLE: RIGHT EAR
LOCATION SIMPLE: RIGHT FOREARM
LOCATION SIMPLE: LEFT UPPER BACK
LOCATION SIMPLE: LEFT FOREARM

## 2025-06-24 ASSESSMENT — LOCATION ZONE DERM
LOCATION ZONE: EYELID
LOCATION ZONE: FACE
LOCATION ZONE: TRUNK
LOCATION ZONE: LEG
LOCATION ZONE: ARM
LOCATION ZONE: NECK
LOCATION ZONE: LIP
LOCATION ZONE: EAR

## 2025-06-24 NOTE — PROCEDURE: ADDITIONAL NOTES
Additional Notes: (left anterior shoulder) BCC Tx: [facility] 12/15/2004\\n(Left inferior lateral upper back) BCC margins negative Bx: 12/20/2024
Render Risk Assessment In Note?: no
Detail Level: Simple
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
Additional Notes: Patient is waiting until fall to start doing blue light therapy

## 2025-06-24 NOTE — PROCEDURE: BIOPSY BY SHAVE METHOD
Detail Level: Detailed
Depth Of Biopsy: dermis
Was A Bandage Applied: Yes
Size Of Lesion In Cm: 0.7
X Size Of Lesion In Cm: 0
Biopsy Type: H and E
Biopsy Method: Dermablade
Anesthesia Type: 1% lidocaine with epinephrine
Anesthesia Volume In Cc: 0.5
Hemostasis: Electrocautery
Wound Care: Petrolatum
Dressing: bandage
Destruction After The Procedure: No
Type Of Destruction Used: Curettage
Curettage Text: The wound bed was treated with curettage after the biopsy was performed.
Cryotherapy Text: The wound bed was treated with cryotherapy after the biopsy was performed.
Electrodesiccation Text: The wound bed was treated with electrodesiccation after the biopsy was performed.
Electrodesiccation And Curettage Text: The wound bed was treated with electrodesiccation and curettage after the biopsy was performed.
Silver Nitrate Text: The wound bed was treated with silver nitrate after the biopsy was performed.
Lab: [id number]
Medical Necessity Information: It is in your best interest to select a reason for this procedure from the list below. All of these items fulfill various CMS LCD requirements except the new and changing color options.
Consent: Written consent was obtained and risks were reviewed including but not limited to scarring, infection, bleeding, scabbing, incomplete removal, nerve damage and allergy to anesthesia.
Post-Care Instructions: I reviewed with the patient in detail post-care instructions. Patient is to keep the biopsy site dry overnight, and then apply bacitracin twice daily until healed. Patient may apply hydrogen peroxide soaks to remove any crusting.
Notification Instructions: Patient will be notified of biopsy results. However, patient instructed to call the office if not contacted within 2 weeks.
Billing Type: Third-Party Bill
Information: Selecting Yes will display possible errors in your note based on the variables you have selected. This validation is only offered as a suggestion for you. PLEASE NOTE THAT THE VALIDATION TEXT WILL BE REMOVED WHEN YOU FINALIZE YOUR NOTE. IF YOU WANT TO FAX A PRELIMINARY NOTE YOU WILL NEED TO TOGGLE THIS TO 'NO' IF YOU DO NOT WANT IT IN YOUR FAXED NOTE.

## 2025-08-14 ENCOUNTER — APPOINTMENT (OUTPATIENT)
Dept: URBAN - METROPOLITAN AREA CLINIC 146 | Facility: CLINIC | Age: OVER 89
Setting detail: DERMATOLOGY
End: 2025-08-14

## 2025-08-14 PROBLEM — C44.319 BASAL CELL CARCINOMA OF SKIN OF OTHER PARTS OF FACE: Status: ACTIVE | Noted: 2025-08-14

## 2025-08-14 PROCEDURE — ?

## 2025-08-14 PROCEDURE — ? REPAIR NOTE

## 2025-08-14 PROCEDURE — ? MOHS SURGERY

## 2025-08-26 ENCOUNTER — APPOINTMENT (OUTPATIENT)
Dept: URBAN - METROPOLITAN AREA CLINIC 146 | Facility: CLINIC | Age: OVER 89
Setting detail: DERMATOLOGY
End: 2025-08-26

## 2025-08-26 DIAGNOSIS — Z48.817 ENCOUNTER FOR SURGICAL AFTERCARE FOLLOWING SURGERY ON THE SKIN AND SUBCUTANEOUS TISSUE: ICD-10-CM

## 2025-08-26 DIAGNOSIS — Z48.02 ENCOUNTER FOR REMOVAL OF SUTURES: ICD-10-CM

## 2025-08-26 PROCEDURE — ? POST-OP WOUND CHECK

## 2025-08-26 PROCEDURE — ? ADDITIONAL NOTES

## 2025-08-26 PROCEDURE — ? SUTURE REMOVAL (GLOBAL PERIOD)

## 2025-08-26 ASSESSMENT — LOCATION DETAILED DESCRIPTION DERM: LOCATION DETAILED: RIGHT INFERIOR MEDIAL MALAR CHEEK

## 2025-08-26 ASSESSMENT — LOCATION SIMPLE DESCRIPTION DERM: LOCATION SIMPLE: RIGHT CHEEK

## 2025-08-26 ASSESSMENT — LOCATION ZONE DERM: LOCATION ZONE: FACE

## (undated) DEVICE — PACK HAND WRIST SOP15HWFSP

## (undated) DEVICE — DRAIN PENROSE 0.25"X18" LATEX FREE GR201

## (undated) DEVICE — CAST PLASTER SPLINT 4X15" 7394

## (undated) DEVICE — GLOVE PROTEXIS BLUE W/NEU-THERA 6.5  2D73EB65

## (undated) DEVICE — CAST PADDING 4" STERILE 9044S

## (undated) DEVICE — GLOVE PROTEXIS MICRO 6.5  2D73PM65

## (undated) DEVICE — SOL WATER IRRIG 1000ML BOTTLE 2F7114

## (undated) DEVICE — SU VICRYL 3-0 RB-1 27" UND J215H

## (undated) DEVICE — LINEN TOWEL PACK X5 5464

## (undated) DEVICE — DRSG KERLIX 4 1/2"X4YDS ROLL 6715

## (undated) DEVICE — SUCTION CANISTER MEDIVAC LINER 3000ML W/LID 65651-530

## (undated) DEVICE — SOL NACL 0.9% IRRIG 1000ML BOTTLE 2F7124

## (undated) DEVICE — SU MONOCRYL 4-0 PC-3 18" UND Y845G

## (undated) DEVICE — TOURNIQUET CUFF 18" STERILE

## (undated) DEVICE — GLOVE PROTEXIS POWDER FREE 7.5 ORTHOPEDIC 2D73ET75

## (undated) DEVICE — BNDG ELASTIC 4"X5YDS UNSTERILE 6611-40

## (undated) DEVICE — DRSG STERI STRIP 1/2X4" R1547

## (undated) RX ORDER — FENTANYL CITRATE 50 UG/ML
INJECTION, SOLUTION INTRAMUSCULAR; INTRAVENOUS
Status: DISPENSED
Start: 2020-01-03

## (undated) RX ORDER — PROPOFOL 10 MG/ML
INJECTION, EMULSION INTRAVENOUS
Status: DISPENSED
Start: 2020-01-03

## (undated) RX ORDER — CEFAZOLIN SODIUM 2 G/100ML
INJECTION, SOLUTION INTRAVENOUS
Status: DISPENSED
Start: 2020-01-03

## (undated) RX ORDER — FENTANYL CITRATE 0.05 MG/ML
INJECTION, SOLUTION INTRAMUSCULAR; INTRAVENOUS
Status: DISPENSED
Start: 2020-01-03